# Patient Record
Sex: FEMALE | Race: WHITE | Employment: OTHER | URBAN - METROPOLITAN AREA
[De-identification: names, ages, dates, MRNs, and addresses within clinical notes are randomized per-mention and may not be internally consistent; named-entity substitution may affect disease eponyms.]

---

## 2017-01-02 ENCOUNTER — HOSPITAL ENCOUNTER (EMERGENCY)
Facility: HOSPITAL | Age: 82
Discharge: HOME | End: 2017-01-02
Attending: EMERGENCY MEDICINE

## 2017-01-02 ENCOUNTER — APPOINTMENT (OUTPATIENT)
Dept: CT IMAGING | Facility: HOSPITAL | Age: 82
End: 2017-01-02
Attending: EMERGENCY MEDICINE

## 2017-01-02 VITALS
HEART RATE: 61 BPM | BODY MASS INDEX: 29.16 KG/M2 | WEIGHT: 175 LBS | RESPIRATION RATE: 16 BRPM | SYSTOLIC BLOOD PRESSURE: 117 MMHG | DIASTOLIC BLOOD PRESSURE: 68 MMHG | TEMPERATURE: 98 F | HEIGHT: 65 IN | OXYGEN SATURATION: 95 %

## 2017-01-02 DIAGNOSIS — B02.29 POSTHERPETIC NEURALGIA: Primary | ICD-10-CM

## 2017-01-02 LAB
ALBUMIN SERPL-MCNC: 3.7 G/DL (ref 3.5–4.8)
ALP LIVER SERPL-CCNC: 79 U/L (ref 55–142)
ALT SERPL-CCNC: 15 U/L (ref 14–54)
AST SERPL-CCNC: 16 U/L (ref 15–41)
BASOPHILS # BLD AUTO: 0.01 X10(3) UL (ref 0–0.1)
BASOPHILS NFR BLD AUTO: 0.2 %
BILIRUB SERPL-MCNC: 0.2 MG/DL (ref 0.1–2)
BILIRUB UR QL STRIP.AUTO: NEGATIVE
BUN BLD-MCNC: 13 MG/DL (ref 8–20)
CALCIUM BLD-MCNC: 8.8 MG/DL (ref 8.3–10.3)
CHLORIDE: 95 MMOL/L (ref 101–111)
CLARITY UR REFRACT.AUTO: CLEAR
CO2: 28 MMOL/L (ref 22–32)
COLOR UR AUTO: YELLOW
CREAT BLD-MCNC: 0.78 MG/DL (ref 0.55–1.02)
EOSINOPHIL # BLD AUTO: 0.02 X10(3) UL (ref 0–0.3)
EOSINOPHIL NFR BLD AUTO: 0.3 %
ERYTHROCYTE [DISTWIDTH] IN BLOOD BY AUTOMATED COUNT: 13.2 % (ref 11.5–16)
GLUCOSE BLD-MCNC: 123 MG/DL (ref 70–99)
GLUCOSE UR STRIP.AUTO-MCNC: NEGATIVE MG/DL
HCT VFR BLD AUTO: 41.7 % (ref 34–50)
HGB BLD-MCNC: 14.4 G/DL (ref 12–16)
IMMATURE GRANULOCYTE COUNT: 0.04 X10(3) UL (ref 0–1)
IMMATURE GRANULOCYTE RATIO %: 0.6 %
KETONES UR STRIP.AUTO-MCNC: NEGATIVE MG/DL
LEUKOCYTE ESTERASE UR QL STRIP.AUTO: NEGATIVE
LIPASE: 150 U/L (ref 73–393)
LYMPHOCYTES # BLD AUTO: 0.92 X10(3) UL (ref 0.9–4)
LYMPHOCYTES NFR BLD AUTO: 14.4 %
M PROTEIN MFR SERPL ELPH: 7.5 G/DL (ref 6.1–8.3)
MCH RBC QN AUTO: 29.7 PG (ref 27–33.2)
MCHC RBC AUTO-ENTMCNC: 34.5 G/DL (ref 31–37)
MCV RBC AUTO: 86 FL (ref 81–100)
MONOCYTES # BLD AUTO: 0.21 X10(3) UL (ref 0.1–0.6)
MONOCYTES NFR BLD AUTO: 3.3 %
NEUTROPHIL ABS PRELIM: 5.17 X10 (3) UL (ref 1.3–6.7)
NEUTROPHILS # BLD AUTO: 5.17 X10(3) UL (ref 1.3–6.7)
NEUTROPHILS NFR BLD AUTO: 81.2 %
NITRITE UR QL STRIP.AUTO: NEGATIVE
PH UR STRIP.AUTO: 6 [PH] (ref 4.5–8)
PLATELET # BLD AUTO: 249 10(3)UL (ref 150–450)
POTASSIUM SERPL-SCNC: 4.4 MMOL/L (ref 3.6–5.1)
PROT UR STRIP.AUTO-MCNC: NEGATIVE MG/DL
RBC # BLD AUTO: 4.85 X10(6)UL (ref 3.8–5.1)
RBC UR QL AUTO: NEGATIVE
RED CELL DISTRIBUTION WIDTH-SD: 41.7 FL (ref 35.1–46.3)
SODIUM SERPL-SCNC: 131 MMOL/L (ref 136–144)
SP GR UR STRIP.AUTO: 1.01 (ref 1–1.03)
UROBILINOGEN UR STRIP.AUTO-MCNC: <2 MG/DL
WBC # BLD AUTO: 6.4 X10(3) UL (ref 4–13)

## 2017-01-02 PROCEDURE — 99285 EMERGENCY DEPT VISIT HI MDM: CPT

## 2017-01-02 PROCEDURE — 81003 URINALYSIS AUTO W/O SCOPE: CPT | Performed by: EMERGENCY MEDICINE

## 2017-01-02 PROCEDURE — 80053 COMPREHEN METABOLIC PANEL: CPT | Performed by: EMERGENCY MEDICINE

## 2017-01-02 PROCEDURE — 83690 ASSAY OF LIPASE: CPT | Performed by: EMERGENCY MEDICINE

## 2017-01-02 PROCEDURE — 85025 COMPLETE CBC W/AUTO DIFF WBC: CPT | Performed by: EMERGENCY MEDICINE

## 2017-01-02 PROCEDURE — 99284 EMERGENCY DEPT VISIT MOD MDM: CPT

## 2017-01-02 PROCEDURE — 74176 CT ABD & PELVIS W/O CONTRAST: CPT

## 2017-01-02 RX ORDER — HYDROCODONE BITARTRATE AND ACETAMINOPHEN 5; 325 MG/1; MG/1
1 TABLET ORAL ONCE
Status: COMPLETED | OUTPATIENT
Start: 2017-01-02 | End: 2017-01-02

## 2017-01-02 RX ORDER — GABAPENTIN 300 MG/1
300 CAPSULE ORAL 3 TIMES DAILY
Qty: 21 CAPSULE | Refills: 0 | Status: ON HOLD | OUTPATIENT
Start: 2017-01-02 | End: 2017-01-10

## 2017-01-02 NOTE — ED PROVIDER NOTES
Patient Seen in: [facility] Emergency Department    History   Patient presents with:  Abdomen/Flank Pain (GI/)    Stated Complaint: abdominal pain x 2 months    HPI    15-year-old female presents for evaluation of right hip and groin pain.   Robina (eight) hours as needed for Pain or Fever. benzonatate 100 MG Oral Cap,  Take 1 capsule (100 mg total) by mouth 3 (three) times daily as needed for cough.    alprazolam 0.25 MG Oral Tab,  Take 1 tablet (0.25 mg total) by mouth every 4 (four) hours as need 1656 98 °F (36.7 °C)   Temp src 01/02/17 1656 Temporal   SpO2 01/02/17 1656 97 %   O2 Device 01/02/17 1656 None (Room air)       Current:/68 mmHg  Pulse 61  Temp(Src) 98 °F (36.7 °C) (Temporal)  Resp 16  Ht 165.1 cm (5' 5\")  Wt 79.379 kg  BMI 29.12 She has no tenderness in this area. I suspect patient's pain is related to postherpetic neuralgia. She will be prescribed gabapentin. She was told to follow-up with her primary care physician to monitor her use of this drug later this week.   Return t

## 2017-01-02 NOTE — ED INITIAL ASSESSMENT (HPI)
Patient c/o right lower back pain that radiates to her abdomen. Patient has had pain for the past two months, but the pain seems to be getting worse.

## 2017-01-06 ENCOUNTER — APPOINTMENT (OUTPATIENT)
Dept: MRI IMAGING | Facility: HOSPITAL | Age: 82
DRG: 552 | End: 2017-01-06
Attending: FAMILY MEDICINE
Payer: MEDICARE

## 2017-01-06 ENCOUNTER — HOSPITAL ENCOUNTER (INPATIENT)
Facility: HOSPITAL | Age: 82
LOS: 4 days | Discharge: HOME | DRG: 552 | End: 2017-01-10
Attending: FAMILY MEDICINE | Admitting: FAMILY MEDICINE
Payer: MEDICARE

## 2017-01-06 ENCOUNTER — APPOINTMENT (OUTPATIENT)
Dept: ULTRASOUND IMAGING | Facility: HOSPITAL | Age: 82
DRG: 552 | End: 2017-01-06
Attending: FAMILY MEDICINE
Payer: MEDICARE

## 2017-01-06 PROBLEM — R52 INTRACTABLE PAIN: Status: ACTIVE | Noted: 2017-01-06

## 2017-01-06 LAB
ALBUMIN SERPL-MCNC: 3.4 G/DL (ref 3.5–4.8)
ALP LIVER SERPL-CCNC: 74 U/L (ref 55–142)
ALT SERPL-CCNC: 19 U/L (ref 14–54)
AST SERPL-CCNC: 15 U/L (ref 15–41)
BASOPHILS # BLD AUTO: 0.06 X10(3) UL (ref 0–0.1)
BASOPHILS NFR BLD AUTO: 0.8 %
BILIRUB SERPL-MCNC: 0.2 MG/DL (ref 0.1–2)
BUN BLD-MCNC: 16 MG/DL (ref 8–20)
CALCIUM BLD-MCNC: 8.3 MG/DL (ref 8.3–10.3)
CHLORIDE: 94 MMOL/L (ref 101–111)
CO2: 29 MMOL/L (ref 22–32)
CREAT BLD-MCNC: 0.8 MG/DL (ref 0.55–1.02)
EOSINOPHIL # BLD AUTO: 0.34 X10(3) UL (ref 0–0.3)
EOSINOPHIL NFR BLD AUTO: 4.4 %
ERYTHROCYTE [DISTWIDTH] IN BLOOD BY AUTOMATED COUNT: 13.4 % (ref 11.5–16)
GLUCOSE BLD-MCNC: 130 MG/DL (ref 70–99)
HCT VFR BLD AUTO: 39.9 % (ref 34–50)
HGB BLD-MCNC: 13.4 G/DL (ref 12–16)
IMMATURE GRANULOCYTE COUNT: 0.04 X10(3) UL (ref 0–1)
IMMATURE GRANULOCYTE RATIO %: 0.5 %
INR BLD: 0.94 (ref 0.89–1.12)
LYMPHOCYTES # BLD AUTO: 3.28 X10(3) UL (ref 0.9–4)
LYMPHOCYTES NFR BLD AUTO: 42.6 %
M PROTEIN MFR SERPL ELPH: 6.6 G/DL (ref 6.1–8.3)
MCH RBC QN AUTO: 29.9 PG (ref 27–33.2)
MCHC RBC AUTO-ENTMCNC: 33.6 G/DL (ref 31–37)
MCV RBC AUTO: 89.1 FL (ref 81–100)
MONOCYTES # BLD AUTO: 0.77 X10(3) UL (ref 0.1–0.6)
MONOCYTES NFR BLD AUTO: 10 %
NEUTROPHIL ABS PRELIM: 3.21 X10 (3) UL (ref 1.3–6.7)
NEUTROPHILS # BLD AUTO: 3.21 X10(3) UL (ref 1.3–6.7)
NEUTROPHILS NFR BLD AUTO: 41.7 %
PLATELET # BLD AUTO: 238 10(3)UL (ref 150–450)
POTASSIUM SERPL-SCNC: 3.3 MMOL/L (ref 3.6–5.1)
PSA SERPL DL<=0.01 NG/ML-MCNC: 12.9 SECONDS (ref 12.3–14.8)
RBC # BLD AUTO: 4.48 X10(6)UL (ref 3.8–5.1)
RED CELL DISTRIBUTION WIDTH-SD: 43.9 FL (ref 35.1–46.3)
SODIUM SERPL-SCNC: 132 MMOL/L (ref 136–144)
WBC # BLD AUTO: 7.7 X10(3) UL (ref 4–13)

## 2017-01-06 PROCEDURE — 72148 MRI LUMBAR SPINE W/O DYE: CPT

## 2017-01-06 PROCEDURE — 76770 US EXAM ABDO BACK WALL COMP: CPT

## 2017-01-06 RX ORDER — HYDROMORPHONE HYDROCHLORIDE 1 MG/ML
0.4 INJECTION, SOLUTION INTRAMUSCULAR; INTRAVENOUS; SUBCUTANEOUS EVERY 2 HOUR PRN
Status: DISCONTINUED | OUTPATIENT
Start: 2017-01-06 | End: 2017-01-10

## 2017-01-06 RX ORDER — PRAVASTATIN SODIUM 20 MG
20 TABLET ORAL NIGHTLY
Status: DISCONTINUED | OUTPATIENT
Start: 2017-01-06 | End: 2017-01-10

## 2017-01-06 RX ORDER — HYDROMORPHONE HYDROCHLORIDE 1 MG/ML
0.8 INJECTION, SOLUTION INTRAMUSCULAR; INTRAVENOUS; SUBCUTANEOUS EVERY 2 HOUR PRN
Status: DISCONTINUED | OUTPATIENT
Start: 2017-01-06 | End: 2017-01-10

## 2017-01-06 RX ORDER — LORAZEPAM 2 MG/ML
0.5 INJECTION INTRAMUSCULAR EVERY 6 HOURS PRN
Status: DISCONTINUED | OUTPATIENT
Start: 2017-01-06 | End: 2017-01-10

## 2017-01-06 RX ORDER — TRIAMTERENE AND HYDROCHLOROTHIAZIDE 37.5; 25 MG/1; MG/1
1 CAPSULE ORAL DAILY
Status: DISCONTINUED | OUTPATIENT
Start: 2017-01-06 | End: 2017-01-10

## 2017-01-06 RX ORDER — ALPRAZOLAM 0.25 MG/1
0.25 TABLET ORAL EVERY 4 HOURS PRN
Status: DISCONTINUED | OUTPATIENT
Start: 2017-01-06 | End: 2017-01-10

## 2017-01-06 RX ORDER — POTASSIUM CHLORIDE 20 MEQ/1
40 TABLET, EXTENDED RELEASE ORAL EVERY 4 HOURS
Status: COMPLETED | OUTPATIENT
Start: 2017-01-06 | End: 2017-01-07

## 2017-01-06 RX ORDER — GABAPENTIN 300 MG/1
300 CAPSULE ORAL 3 TIMES DAILY
Status: DISCONTINUED | OUTPATIENT
Start: 2017-01-06 | End: 2017-01-10

## 2017-01-06 RX ORDER — ACETAMINOPHEN 325 MG/1
650 TABLET ORAL EVERY 6 HOURS PRN
Status: DISCONTINUED | OUTPATIENT
Start: 2017-01-06 | End: 2017-01-10

## 2017-01-06 RX ORDER — ONDANSETRON 2 MG/ML
4 INJECTION INTRAMUSCULAR; INTRAVENOUS EVERY 6 HOURS PRN
Status: DISCONTINUED | OUTPATIENT
Start: 2017-01-06 | End: 2017-01-10

## 2017-01-06 RX ORDER — HYDROCODONE BITARTRATE AND ACETAMINOPHEN 10; 325 MG/1; MG/1
1 TABLET ORAL EVERY 6 HOURS PRN
Status: DISCONTINUED | OUTPATIENT
Start: 2017-01-06 | End: 2017-01-10

## 2017-01-06 RX ORDER — BENZONATATE 100 MG/1
100 CAPSULE ORAL EVERY 4 HOURS PRN
Status: DISCONTINUED | OUTPATIENT
Start: 2017-01-06 | End: 2017-01-10

## 2017-01-06 RX ORDER — LORAZEPAM 2 MG/ML
1 INJECTION INTRAMUSCULAR EVERY 6 HOURS PRN
Status: DISCONTINUED | OUTPATIENT
Start: 2017-01-06 | End: 2017-01-10

## 2017-01-06 RX ORDER — SODIUM CHLORIDE 9 MG/ML
INJECTION, SOLUTION INTRAVENOUS CONTINUOUS
Status: DISCONTINUED | OUTPATIENT
Start: 2017-01-06 | End: 2017-01-09

## 2017-01-06 RX ORDER — PAROXETINE HYDROCHLORIDE 20 MG/1
40 TABLET, FILM COATED ORAL EVERY MORNING
Status: DISCONTINUED | OUTPATIENT
Start: 2017-01-07 | End: 2017-01-10

## 2017-01-06 RX ORDER — HYDROCODONE BITARTRATE AND ACETAMINOPHEN 10; 325 MG/1; MG/1
2 TABLET ORAL EVERY 6 HOURS PRN
Status: DISCONTINUED | OUTPATIENT
Start: 2017-01-06 | End: 2017-01-10

## 2017-01-06 RX ORDER — CYCLOBENZAPRINE HCL 5 MG
5 TABLET ORAL 3 TIMES DAILY PRN
Status: DISCONTINUED | OUTPATIENT
Start: 2017-01-06 | End: 2017-01-10

## 2017-01-06 RX ORDER — HEPARIN SODIUM 5000 [USP'U]/ML
5000 INJECTION, SOLUTION INTRAVENOUS; SUBCUTANEOUS EVERY 12 HOURS
Status: DISCONTINUED | OUTPATIENT
Start: 2017-01-06 | End: 2017-01-07

## 2017-01-06 RX ORDER — TEMAZEPAM 7.5 MG/1
7.5 CAPSULE ORAL NIGHTLY PRN
Status: DISCONTINUED | OUTPATIENT
Start: 2017-01-06 | End: 2017-01-10

## 2017-01-06 RX ORDER — HYDROMORPHONE HYDROCHLORIDE 1 MG/ML
0.2 INJECTION, SOLUTION INTRAMUSCULAR; INTRAVENOUS; SUBCUTANEOUS EVERY 2 HOUR PRN
Status: DISCONTINUED | OUTPATIENT
Start: 2017-01-06 | End: 2017-01-10

## 2017-01-06 RX ORDER — LOSARTAN POTASSIUM 50 MG/1
50 TABLET ORAL DAILY
Status: DISCONTINUED | OUTPATIENT
Start: 2017-01-06 | End: 2017-01-10

## 2017-01-06 NOTE — PROGRESS NOTES
NURSING ADMISSION NOTE      Patient admitted via wheelchair. Oriented to room. Patient alert and oriented x 4. Safety precautions initiated. Bed in low position. Call light in reach. Dr. Anup Carson paged to obtain admitting orders.

## 2017-01-07 ENCOUNTER — APPOINTMENT (OUTPATIENT)
Dept: GENERAL RADIOLOGY | Facility: HOSPITAL | Age: 82
DRG: 552 | End: 2017-01-07
Attending: FAMILY MEDICINE
Payer: MEDICARE

## 2017-01-07 LAB
BILIRUB UR QL STRIP.AUTO: NEGATIVE
CLARITY UR REFRACT.AUTO: CLEAR
COLOR UR AUTO: YELLOW
GLUCOSE UR STRIP.AUTO-MCNC: NEGATIVE MG/DL
KETONES UR STRIP.AUTO-MCNC: NEGATIVE MG/DL
LEUKOCYTE ESTERASE UR QL STRIP.AUTO: NEGATIVE
NITRITE UR QL STRIP.AUTO: NEGATIVE
PH UR STRIP.AUTO: 6 [PH] (ref 4.5–8)
POTASSIUM SERPL-SCNC: 4.4 MMOL/L (ref 3.6–5.1)
PROT UR STRIP.AUTO-MCNC: NEGATIVE MG/DL
SP GR UR STRIP.AUTO: 1.02 (ref 1–1.03)
UROBILINOGEN UR STRIP.AUTO-MCNC: 0.2 MG/DL

## 2017-01-07 PROCEDURE — 71020 XR CHEST PA + LAT CHEST (CPT=71020): CPT

## 2017-01-07 NOTE — PLAN OF CARE
Impaired Functional Mobility    • Achieve highest/safest level of mobility/gait Progressing        MUSCULOSKELETAL - ADULT    • Return mobility to safest level of function Progressing    • Maintain proper alignment of affected body part Progressing

## 2017-01-07 NOTE — CONSULTS
[facility]    Spine Surgery H&P  Dr. Sadia Pal Patient Status:  Inpatient    1935 MRN [id number]   [facility] 3SW-A Attending Annalisa Garcia MD   Hosp Day # 1 PCP Jerome Donovan MD     Subjective:  Sonam Ambrocio Signs: Blood pressure 117/62, pulse 63, temperature 97.4 °F (36.3 °C), temperature source Oral, resp. rate 18, height 5' 3\" (1.6 m), weight 175 lb (79.379 kg), SpO2 95 %.     Pain Assessment  Pain Assessment Tool: 0-10  Pasero Sedation Scale: Awake and al lymphadenopathy. Neurologic: Cranial nerves II- XII are grossly intact. Motor strength and sensory examination is grossly normal.  No focal neurologic deficit.     Radiographic Studies : reviewed    Labs:    Lab Results  Component Value Date   WBC 7.7 01/ postoperative     Post-herpetic polyneuropathy     Strain     Intractable pain          Lesli Lin  1/7/2017  11:50 AM

## 2017-01-07 NOTE — PROGRESS NOTES
Spoke with Dr. Karolina Vigli regarding new consult for [facility]. Plan for JOSÉ in IR with Dr. Karolina Vigil on Monday. Notified Dr. Christo Lucas at this time. Pt to remain in hospital until her JOSÉ on Monday.

## 2017-01-07 NOTE — PROGRESS NOTES
[facility]  Progress Note    Durene Linear Patient Status:  Inpatient    1935 MRN [id number]   [facility] 3SW-A Attending Augusto Horn MD   Hosp Day # 1 PCP Yuli Diaz MD       SUBJECTIVE:  No CP, SOB, or N/V.   Pain contr (CPT=71020)     INDICATIONS:  copd     COMPARISON:  [facility] , CT APPENDIX ABD/PEL WO CONTRAST (CPT=74176), 1/02/2017, 18:52.  [facility] , XR CHEST AP/PA (1 VIEW) (CPT=71010), 12/27/2016, 16:30.     TECHNIQUE:  PA and lateral chest radiographs were obtained.     stenosis. Narrowing of bilateral subarticular zones. . Moderate severe bilateral neural foraminal stenosis. L4-L5:  Moderate diffuse disc bulge bilateral facet hypertrophy and ligamenta flava hypertrophy. Moderate to severe spinal canal stenosis.  Narrowing cm  ECHOGENICITY:  Normal.  HYDRONEPHROSIS:  None. CYSTS/STONES/MASSES:  Multiple renal cysts, largest measuring 4.8 x 4.2 x 4.5 cm is within the midpole and 7.6 x 6.0 x 7.8 cm     BLADDER:  No bladder wall thickening.  Bilateral ureteral jets are identifi Intravenous Q6H PRN   Or      LORazepam (ATIVAN) injection 1 mg 1 mg Intravenous Q6H PRN   ondansetron HCl (ZOFRAN) injection 4 mg 4 mg Intravenous Q6H PRN         Assessment  Patient Active Problem List:     Acute diverticulitis     HTN (hypertension)

## 2017-01-07 NOTE — H&P
[facility]  History & Physical    Maame Adler Patient Status:  Inpatient    1935 MRN [id number]   [facility] 3SW-A Attending Sherlynn Cheadle, MD   Hosp Day # 1 PCP Renaldo Litten, MD     History of Present Illness:  Maame Adler drugs. Allergies:    Sulfa Antibiotics       Hives  Latex                   Rash    Home Medications:    Prescriptions prior to admission:  gabapentin 300 MG Oral Cap Take 1 capsule (300 mg total) by mouth 3 (three) times daily.  (Patient not taking: Rep Taking   PARoxetine HCl (PAXIL) 40 MG Oral Tab Take 40 mg by mouth every morning. Disp:  Rfl:  Taking       Review of Systems:  Pertinent items are noted in HPI.     Physical Exam:  BP 97/50 mmHg  Pulse 55  Temp(Src) 98 °F (36.7 °C) (Oral)  Resp 16  Ht 160 01/06/2017   .0 01/06/2017   CREATSERUM 0.80 01/06/2017   BUN 16 01/06/2017    01/06/2017   K 3.3 01/06/2017   CL 94 01/06/2017   CO2 29.0 01/06/2017    01/06/2017   CA 8.3 01/06/2017   ALB 3.4 01/06/2017   ALKPHO 74 01/06/2017   BILT 0

## 2017-01-07 NOTE — PHYSICAL THERAPY NOTE
PHYSICAL THERAPY EVALUATION - INPATIENT     Room Number: 382/382-A  Evaluation Date: 1/7/2017  Type of Evaluation: Initial  Physician Order: PT Eval and Treat    Presenting Problem: Intractable LBP  Reason for Therapy: Mobility Dysfunction and Discharg better    OBJECTIVE  Precautions: None  Fall Risk: Standard fall risk    WEIGHT BEARING RESTRICTION  Weight Bearing Restriction: None                PAIN ASSESSMENT  Rating: 3  Location: R LBP  Management Techniques:  Activity promotion;Repositioning    COG Provided: Pt received in bed. Bed mobility skills at (S) level. Sit > stand with cues for hand placement and (S). Amb with RW and SBA x 350 feet with overall slow speed and occasional veering to the R.  Returned to room to sit up in chair for lunch.   Ad

## 2017-01-07 NOTE — PLAN OF CARE
MUSCULOSKELETAL - ADULT    • Return mobility to safest level of function Progressing    • Maintain proper alignment of affected body part Progressing        PAIN - ADULT    • Verbalizes/displays adequate comfort level or patient's stated pain goal Progress

## 2017-01-08 LAB
ALBUMIN SERPL-MCNC: 3.4 G/DL (ref 3.5–4.8)
ALP LIVER SERPL-CCNC: 78 U/L (ref 55–142)
ALT SERPL-CCNC: 19 U/L (ref 14–54)
AST SERPL-CCNC: 28 U/L (ref 15–41)
BASOPHILS # BLD AUTO: 0.08 X10(3) UL (ref 0–0.1)
BASOPHILS NFR BLD AUTO: 0.8 %
BILIRUB SERPL-MCNC: 0.4 MG/DL (ref 0.1–2)
BUN BLD-MCNC: 12 MG/DL (ref 8–20)
CALCIUM BLD-MCNC: 8.8 MG/DL (ref 8.3–10.3)
CHLORIDE: 94 MMOL/L (ref 101–111)
CO2: 31 MMOL/L (ref 22–32)
CREAT BLD-MCNC: 0.77 MG/DL (ref 0.55–1.02)
EOSINOPHIL # BLD AUTO: 0.42 X10(3) UL (ref 0–0.3)
EOSINOPHIL NFR BLD AUTO: 4.4 %
ERYTHROCYTE [DISTWIDTH] IN BLOOD BY AUTOMATED COUNT: 13.4 % (ref 11.5–16)
GLUCOSE BLD-MCNC: 79 MG/DL (ref 70–99)
HCT VFR BLD AUTO: 41.9 % (ref 34–50)
HGB BLD-MCNC: 14.1 G/DL (ref 12–16)
IMMATURE GRANULOCYTE COUNT: 0.05 X10(3) UL (ref 0–1)
IMMATURE GRANULOCYTE RATIO %: 0.5 %
LYMPHOCYTES # BLD AUTO: 2.52 X10(3) UL (ref 0.9–4)
LYMPHOCYTES NFR BLD AUTO: 26.6 %
M PROTEIN MFR SERPL ELPH: 6.9 G/DL (ref 6.1–8.3)
MCH RBC QN AUTO: 30.1 PG (ref 27–33.2)
MCHC RBC AUTO-ENTMCNC: 33.7 G/DL (ref 31–37)
MCV RBC AUTO: 89.5 FL (ref 81–100)
MONOCYTES # BLD AUTO: 0.73 X10(3) UL (ref 0.1–0.6)
MONOCYTES NFR BLD AUTO: 7.7 %
NEUTROPHIL ABS PRELIM: 5.67 X10 (3) UL (ref 1.3–6.7)
NEUTROPHILS # BLD AUTO: 5.67 X10(3) UL (ref 1.3–6.7)
NEUTROPHILS NFR BLD AUTO: 60 %
PLATELET # BLD AUTO: 251 10(3)UL (ref 150–450)
POTASSIUM SERPL-SCNC: 4.6 MMOL/L (ref 3.6–5.1)
RBC # BLD AUTO: 4.68 X10(6)UL (ref 3.8–5.1)
RED CELL DISTRIBUTION WIDTH-SD: 43.9 FL (ref 35.1–46.3)
SODIUM SERPL-SCNC: 130 MMOL/L (ref 136–144)
WBC # BLD AUTO: 9.5 X10(3) UL (ref 4–13)

## 2017-01-08 NOTE — PROGRESS NOTES
Patient to receive LESI in patient by IR on Monday. Follow up with spine surgery outpatient. No urgent surgical indications at this time.    It was discussed with Dr. Homa Hughes MD and RN   Follow up with IR and MD.   Continue PT.

## 2017-01-08 NOTE — PROGRESS NOTES
[facility]  Progress Note    Diamond Colón Patient Status:  Inpatient    1935 MRN [id number]   [facility] 3SW-A Attending Louann Dela Cruz MD   Hosp Day # 2 PCP Aiden Edwards MD       SUBJECTIVE:  No CP, SOB, or N/V.   Still a lo 20 mg 20 mg Oral Nightly   Triamterene-HCTZ (DYAZIDE) 37.5-25 MG per cap 1 capsule 1 capsule Oral Daily   HYDROcodone-acetaminophen (NORCO)  MG per tab 1 tablet 1 tablet Oral Q6H PRN   Or      HYDROcodone-acetaminophen (NORCO)  MG per tab 2 tab input  Await brad  Pain consult  Plan to work on pain control at home         Tayler Linares  1/8/2017  12:52 PM

## 2017-01-08 NOTE — PROGRESS NOTES
Spoke with Dr. Priscilla Poon. Pt to go for [facility] tomorrow. NPO after midnight. Pt not on Heparin. Morning labs ordered.     Plan discussed by/with: Milagro Salas RN/Dr. Priscilla Poon

## 2017-01-08 NOTE — CONSULTS
[facility]  Report of Consultation    Heriberto Romo Patient Status:  Inpatient    1935 MRN [id number]   [facility] 3SW-A Attending Herson Seay MD   Hosp Day # 2 PCP Evaristo Chopra MD   Reason for Consultation: pt seen on  mg, 0.25 mg, Oral, Q4H PRN  •  benzonatate (TESSALON) cap 100 mg, 100 mg, Oral, Q4H PRN  •  acetaminophen (TYLENOL) tab 650 mg, 650 mg, Oral, Q6H PRN  •  Cyclobenzaprine HCl (FLEXERIL) tab 5 mg, 5 mg, Oral, TID PRN  •  Fluticasone Furoate-Vilanterol (BREO bilaterally  Heart: regular rate and rhythm  Abdomen: soft, non-tender; bowel sounds normal; no masses,  no organomegaly  Skin: Skin color, texture, turgor normal. No rashes or lesions  Neurologic: Grossly normal    Laboratory Data:         Component Resul flank pain    Recommendations:  No acute urologic condition causing pain  No additional  w/u necessary   Suspect musculoskeletal cause of discomfort      Thank you for allowing me to participate in the care of your patient.     Aryan Morgan  1/8/2017  10

## 2017-01-09 ENCOUNTER — APPOINTMENT (OUTPATIENT)
Dept: INTERVENTIONAL RADIOLOGY/VASCULAR | Facility: HOSPITAL | Age: 82
DRG: 552 | End: 2017-01-09
Attending: ANESTHESIOLOGY
Payer: MEDICARE

## 2017-01-09 LAB
BASOPHILS # BLD AUTO: 0.07 X10(3) UL (ref 0–0.1)
BASOPHILS NFR BLD AUTO: 1.1 %
BUN BLD-MCNC: 10 MG/DL (ref 8–20)
CALCIUM BLD-MCNC: 8.4 MG/DL (ref 8.3–10.3)
CHLORIDE: 96 MMOL/L (ref 101–111)
CO2: 31 MMOL/L (ref 22–32)
CREAT BLD-MCNC: 0.67 MG/DL (ref 0.55–1.02)
EOSINOPHIL # BLD AUTO: 0.48 X10(3) UL (ref 0–0.3)
EOSINOPHIL NFR BLD AUTO: 7.8 %
ERYTHROCYTE [DISTWIDTH] IN BLOOD BY AUTOMATED COUNT: 13.1 % (ref 11.5–16)
GLUCOSE BLD-MCNC: 82 MG/DL (ref 70–99)
HCT VFR BLD AUTO: 40 % (ref 34–50)
HGB BLD-MCNC: 13.9 G/DL (ref 12–16)
IMMATURE GRANULOCYTE COUNT: 0.02 X10(3) UL (ref 0–1)
IMMATURE GRANULOCYTE RATIO %: 0.3 %
INR BLD: 0.96 (ref 0.89–1.12)
LYMPHOCYTES # BLD AUTO: 2.61 X10(3) UL (ref 0.9–4)
LYMPHOCYTES NFR BLD AUTO: 42.2 %
MCH RBC QN AUTO: 29.8 PG (ref 27–33.2)
MCHC RBC AUTO-ENTMCNC: 34.8 G/DL (ref 31–37)
MCV RBC AUTO: 85.8 FL (ref 81–100)
MONOCYTES # BLD AUTO: 0.53 X10(3) UL (ref 0.1–0.6)
MONOCYTES NFR BLD AUTO: 8.6 %
NEUTROPHIL ABS PRELIM: 2.47 X10 (3) UL (ref 1.3–6.7)
NEUTROPHILS # BLD AUTO: 2.47 X10(3) UL (ref 1.3–6.7)
NEUTROPHILS NFR BLD AUTO: 40 %
PLATELET # BLD AUTO: 226 10(3)UL (ref 150–450)
POTASSIUM SERPL-SCNC: 3.7 MMOL/L (ref 3.6–5.1)
PSA SERPL DL<=0.01 NG/ML-MCNC: 13.1 SECONDS (ref 12.3–14.8)
RBC # BLD AUTO: 4.66 X10(6)UL (ref 3.8–5.1)
RED CELL DISTRIBUTION WIDTH-SD: 40.7 FL (ref 35.1–46.3)
SODIUM SERPL-SCNC: 134 MMOL/L (ref 136–144)
WBC # BLD AUTO: 6.2 X10(3) UL (ref 4–13)

## 2017-01-09 PROCEDURE — 62323 NJX INTERLAMINAR LMBR/SAC: CPT

## 2017-01-09 PROCEDURE — 3E0R33Z INTRODUCTION OF ANTI-INFLAMMATORY INTO SPINAL CANAL, PERCUTANEOUS APPROACH: ICD-10-PCS | Performed by: ANESTHESIOLOGY

## 2017-01-09 PROCEDURE — 99152 MOD SED SAME PHYS/QHP 5/>YRS: CPT

## 2017-01-09 PROCEDURE — BR191ZZ FLUOROSCOPY OF LUMBAR SPINE USING LOW OSMOLAR CONTRAST: ICD-10-PCS | Performed by: ANESTHESIOLOGY

## 2017-01-09 RX ORDER — POTASSIUM CHLORIDE 14.9 MG/ML
20 INJECTION INTRAVENOUS ONCE
Status: COMPLETED | OUTPATIENT
Start: 2017-01-09 | End: 2017-01-09

## 2017-01-09 RX ORDER — MIDAZOLAM HYDROCHLORIDE 1 MG/ML
INJECTION INTRAMUSCULAR; INTRAVENOUS
Status: COMPLETED
Start: 2017-01-09 | End: 2017-01-09

## 2017-01-09 RX ORDER — METHYLPREDNISOLONE ACETATE 80 MG/ML
INJECTION, SUSPENSION INTRA-ARTICULAR; INTRALESIONAL; INTRAMUSCULAR; SOFT TISSUE
Status: COMPLETED
Start: 2017-01-09 | End: 2017-01-09

## 2017-01-09 NOTE — PHYSICAL THERAPY NOTE
Attempted PT session, pt politely declining mobility, as she would like to get cleaned up. Pt received up in chair doing her makeup. She states she will ambulate with nursing later. Pt is up with supervision with RW.  Reviewed spine precautions/body mechani

## 2017-01-09 NOTE — PROGRESS NOTES
1237 PM - Pt off unit to go for JOSÉ accompanied by     1339 PM - Pt came back from IR. RN called Dr. Pillo Tsang for discharge clearance.  As per MD he would see patient later and will see if she;s okay to go home

## 2017-01-10 VITALS
DIASTOLIC BLOOD PRESSURE: 82 MMHG | HEIGHT: 63 IN | HEART RATE: 63 BPM | RESPIRATION RATE: 16 BRPM | BODY MASS INDEX: 31.38 KG/M2 | TEMPERATURE: 98 F | SYSTOLIC BLOOD PRESSURE: 134 MMHG | OXYGEN SATURATION: 96 % | WEIGHT: 177.13 LBS

## 2017-01-10 LAB — POTASSIUM SERPL-SCNC: 4.4 MMOL/L (ref 3.6–5.1)

## 2017-01-10 PROCEDURE — 99152 MOD SED SAME PHYS/QHP 5/>YRS: CPT | Performed by: ANESTHESIOLOGY

## 2017-01-10 PROCEDURE — 62323 NJX INTERLAMINAR LMBR/SAC: CPT | Performed by: ANESTHESIOLOGY

## 2017-01-10 RX ORDER — IPRATROPIUM BROMIDE AND ALBUTEROL SULFATE 2.5; .5 MG/3ML; MG/3ML
3 SOLUTION RESPIRATORY (INHALATION)
Status: DISCONTINUED | OUTPATIENT
Start: 2017-01-10 | End: 2017-01-10

## 2017-01-10 RX ORDER — HYDROCODONE BITARTRATE AND ACETAMINOPHEN 10; 325 MG/1; MG/1
2 TABLET ORAL EVERY 6 HOURS PRN
Qty: 42 TABLET | Refills: 0 | Status: SHIPPED | OUTPATIENT
Start: 2017-01-10 | End: 2017-01-11

## 2017-01-10 RX ORDER — IPRATROPIUM BROMIDE AND ALBUTEROL SULFATE 2.5; .5 MG/3ML; MG/3ML
3 SOLUTION RESPIRATORY (INHALATION)
Qty: 270 ML | Refills: 0 | Status: SHIPPED | OUTPATIENT
Start: 2017-01-10 | End: 2017-02-09

## 2017-01-10 RX ORDER — GABAPENTIN 300 MG/1
300 CAPSULE ORAL 3 TIMES DAILY
Qty: 90 CAPSULE | Refills: 5 | Status: SHIPPED | OUTPATIENT
Start: 2017-01-10 | End: 2017-03-07

## 2017-01-10 NOTE — PROGRESS NOTES
[facility]  Progress Note    Durene Linear Patient Status:  Inpatient    1935 MRN [id number]   [facility] 3SW-A Attending Augusto Horn MD   Hosp Day # 4 PCP Yuli Diaz MD       SUBJECTIVE:  No CP, SOB, or N/V.   Still sign capsule Oral Daily   HYDROcodone-acetaminophen (NORCO)  MG per tab 1 tablet 1 tablet Oral Q6H PRN   Or      HYDROcodone-acetaminophen (NORCO)  MG per tab 2 tablet 2 tablet Oral Q6H PRN   HYDROmorphone HCl PF (DILAUDID) 1 MG/ML injection 0.2 mg

## 2017-01-10 NOTE — PROGRESS NOTES
Discharge orders received, instructions given to the pt and . All needs and questions were answered. Prescription for Norco given.  Safety prec in place

## 2017-01-10 NOTE — PROGRESS NOTES
NURSING DISCHARGE NOTE    Discharged VIA wheelchair  Accompanied by   Belongings sent with the patient

## 2017-01-10 NOTE — CDS QUERY
Jaci Pérez  1935 DOS 17-01/10/17 [id number]  [id number]    Jaymie Castaneda  Dear Doctor Dr. Deniece Siemens,  Clinical information (provided below) indicates an unknown status of a documented diagnosis.  For accurate ICD-10-CM

## 2017-01-11 ENCOUNTER — TELEPHONE (OUTPATIENT)
Dept: SURGERY | Facility: CLINIC | Age: 82
End: 2017-01-11

## 2017-01-11 RX ORDER — METHYLPREDNISOLONE 4 MG/1
TABLET ORAL
Qty: 1 KIT | Refills: 0 | Status: SHIPPED | OUTPATIENT
Start: 2017-01-11 | End: 2017-03-07

## 2017-01-11 RX ORDER — HYDROCODONE BITARTRATE AND ACETAMINOPHEN 10; 325 MG/1; MG/1
1 TABLET ORAL EVERY 8 HOURS PRN
Qty: 60 TABLET | Refills: 0 | Status: SHIPPED | OUTPATIENT
Start: 2017-01-11 | End: 2017-03-07

## 2017-01-11 NOTE — TELEPHONE ENCOUNTER
Pt is unable to make appointment today. Offered Follow up on 1/18/2017, pt declined and would like to be placed on wait list for Friday 1/13/2017.

## 2017-01-11 NOTE — TELEPHONE ENCOUNTER
Spoke with pt who would like to schedule a follow up with Dr. Robel Silverio due to pain. Offered appointment for today at 3pm. Pt to call back to confirm if pt is able to make appointment.

## 2017-01-12 NOTE — TELEPHONE ENCOUNTER
Spoke with pt  and informed him of Medrol kit that was sent to pharmacy. Also informed him of new Norco Rx. Verbalized understanding no further questions at this time.

## 2017-01-13 ENCOUNTER — OFFICE VISIT (OUTPATIENT)
Dept: SURGERY | Facility: CLINIC | Age: 82
End: 2017-01-13

## 2017-01-13 VITALS
HEIGHT: 65 IN | DIASTOLIC BLOOD PRESSURE: 76 MMHG | HEART RATE: 81 BPM | RESPIRATION RATE: 17 BRPM | BODY MASS INDEX: 28.82 KG/M2 | WEIGHT: 173 LBS | SYSTOLIC BLOOD PRESSURE: 118 MMHG

## 2017-01-13 DIAGNOSIS — M47.816 LUMBAR FACET ARTHROPATHY: ICD-10-CM

## 2017-01-13 DIAGNOSIS — M46.1 SACROILIITIS (HCC): Primary | ICD-10-CM

## 2017-01-13 PROCEDURE — 99214 OFFICE O/P EST MOD 30 MIN: CPT | Performed by: ANESTHESIOLOGY

## 2017-01-13 NOTE — PATIENT INSTRUCTIONS
Refill policies:    • Allow 2 business days for refills; controlled substances may take longer.   • Contact your pharmacy at least 5 days prior to running out of medication and have them send an electronic request or submit request through the “request re your physician has recommended that you have a procedure or additional testing performed. [facility] will contact your insurance carrier to obtain pre-certification or prior authorization.     Unfortunately, SUSIE has seen an increas lakes[place].    Medication:   Number of days you need to be off for the following medications:  • Aggrenox 10 days   • Agrylin (Anagrelide) 10 days   • Enbrel (Etanercept) 24 hours   • Fragmin (Dalteparin) 24 hours   • Pletal (Cilostazol) 7 days  • Pradaxa 10 da your primary care physician if your blood sugar rises as you may require some medication adjustment.   It is normal to have increased pain at injection site for up to 3-5 days after procedure, you can use heat for the first 24 hours (20 minutes on 20 minute the introducer needle is in a good position by X-ray, a special electrically active needle tip is inserted. With this special needle tip in good position, electrical stimulation is done before any actual radiofrequency ablation.  This electrical stimulation afterward. Ice or heat packs will usually control this discomfort. After that first several days, your pain may be gone or diminished. What should I do after the radiofrequency ablation? You should have a ride home.  You must have a ride home if you rece that can be potentially damaged. Electricity is also used during the procedure raising the possibility of an electrical burn. Great care is taken when placing the radiofrequency needles and using the electrical current, but sometimes complications occur.  Cheryle Levins

## 2017-01-15 NOTE — OPERATIVE REPORT
[facility]  Operative Report  1/10/2017     Arvind Cowan Patient Status:  Inpatient    1935 MRN [id number]   [facility] 3SW-A Attending No att. providers found   Hosp Day # 4 PCP Jerome Donovan MD     Indication: Nora Door is a [de-identified] yea 6 mL was injected through the Tuohy needle. The needle was flushed with 1 mL lidocaine. The needle was withdrawn with the stylet intact in situ. The needle's tip was intact.   The patient tolerated the procedure very well without significant immediate co

## 2017-01-16 NOTE — PROGRESS NOTES
Name: Darlyn Polk   : 1935   DOS: 2017     Pain Clinic Follow Up Visit:   Darlyn Polk is a [de-identified]year old female who presents for recheck of her chronic low back pain. She is status post lumbar epidural steroid injection which did not help her. fluticasone-salmeterol 500-50 MCG/DOSE Inhalation Aerosol Powder, Breath Activated Inhale 1 puff into the lungs 2 (two) times daily as needed. Disp:  Rfl:    magnesium oxide 400 MG Oral Tab Take 400 mg by mouth daily.    Disp:  Rfl:    Triamterene-HCTZ patient indicates understanding of these issues and agrees to the plan. No Follow-up on file.     Zahra George MD, 1/13/2017, 6:56 PM

## 2017-02-09 ENCOUNTER — APPOINTMENT (OUTPATIENT)
Dept: GENERAL RADIOLOGY | Facility: HOSPITAL | Age: 82
End: 2017-02-09
Attending: ANESTHESIOLOGY
Payer: MEDICARE

## 2017-02-09 ENCOUNTER — HOSPITAL ENCOUNTER (OUTPATIENT)
Facility: HOSPITAL | Age: 82
Setting detail: HOSPITAL OUTPATIENT SURGERY
Discharge: HOME | End: 2017-02-09
Attending: ANESTHESIOLOGY | Admitting: ANESTHESIOLOGY
Payer: MEDICARE

## 2017-02-09 ENCOUNTER — SURGERY (OUTPATIENT)
Age: 82
End: 2017-02-09

## 2017-02-09 VITALS
RESPIRATION RATE: 18 BRPM | TEMPERATURE: 98 F | OXYGEN SATURATION: 94 % | HEART RATE: 78 BPM | DIASTOLIC BLOOD PRESSURE: 78 MMHG | SYSTOLIC BLOOD PRESSURE: 130 MMHG

## 2017-02-09 DIAGNOSIS — M46.1 SACROILIITIS (HCC): ICD-10-CM

## 2017-02-09 PROCEDURE — 3E0T3GC INTRODUCTION OF OTHER THERAPEUTIC SUBSTANCE INTO PERIPHERAL NERVES AND PLEXI, PERCUTANEOUS APPROACH: ICD-10-PCS | Performed by: ANESTHESIOLOGY

## 2017-02-09 PROCEDURE — 99152 MOD SED SAME PHYS/QHP 5/>YRS: CPT | Performed by: ANESTHESIOLOGY

## 2017-02-09 PROCEDURE — 3E0T3BZ INTRODUCTION OF ANESTHETIC AGENT INTO PERIPHERAL NERVES AND PLEXI, PERCUTANEOUS APPROACH: ICD-10-PCS | Performed by: ANESTHESIOLOGY

## 2017-02-09 PROCEDURE — 3E0T33Z INTRODUCTION OF ANTI-INFLAMMATORY INTO PERIPHERAL NERVES AND PLEXI, PERCUTANEOUS APPROACH: ICD-10-PCS | Performed by: ANESTHESIOLOGY

## 2017-02-09 RX ORDER — METHYLPREDNISOLONE ACETATE 40 MG/ML
INJECTION, SUSPENSION INTRA-ARTICULAR; INTRALESIONAL; INTRAMUSCULAR; SOFT TISSUE AS NEEDED
Status: DISCONTINUED | OUTPATIENT
Start: 2017-02-09 | End: 2017-02-09 | Stop reason: HOSPADM

## 2017-02-09 RX ORDER — SODIUM CHLORIDE, SODIUM LACTATE, POTASSIUM CHLORIDE, CALCIUM CHLORIDE 600; 310; 30; 20 MG/100ML; MG/100ML; MG/100ML; MG/100ML
100 INJECTION, SOLUTION INTRAVENOUS CONTINUOUS
Status: DISCONTINUED | OUTPATIENT
Start: 2017-02-09 | End: 2017-02-09

## 2017-02-09 RX ORDER — SODIUM CHLORIDE, SODIUM LACTATE, POTASSIUM CHLORIDE, CALCIUM CHLORIDE 600; 310; 30; 20 MG/100ML; MG/100ML; MG/100ML; MG/100ML
100 INJECTION, SOLUTION INTRAVENOUS CONTINUOUS
Status: CANCELLED | OUTPATIENT
Start: 2017-02-09

## 2017-02-09 RX ORDER — LIDOCAINE HYDROCHLORIDE 10 MG/ML
INJECTION, SOLUTION EPIDURAL; INFILTRATION; INTRACAUDAL; PERINEURAL AS NEEDED
Status: DISCONTINUED | OUTPATIENT
Start: 2017-02-09 | End: 2017-02-09 | Stop reason: HOSPADM

## 2017-02-09 NOTE — OPERATIVE REPORT
[facility]  Operative Report  2017     Thelma Martinez Patient Status:  Hospital Outpatient Surgery    1935 MRN [id number]   Location [facility] Attending No att. providers found   [facility] Day # 0 PCP Dinesh Reinoso inferior pole of the right sacroiliac joint. The second needle was placed into the joint but approximately 1 cm cephalad to the first needle. The subsequent needles were place in this \"leap frog\" fashion until the superior pole of the joint is reached.  U

## 2017-02-09 NOTE — H&P
History & Physical Examination    Patient Name: Jarvis Starr  MRN: [id number]  CSN: [id number]  YOB: 1935    Pre-Operative Diagnosis:  Sacroiliitis ([facility] [M46.1]    Present Illness: A [de-identified]year old female with low back pain is here for RFA of the mouth daily. Disp:  Rfl:  Taking   PARoxetine HCl (PAXIL) 40 MG Oral Tab Take 40 mg by mouth every morning.  Disp:  Rfl:  Taking       Current Facility-Administered Medications:  lactated ringers infusion 100 mL/hr Intravenous Continuous       Allergies: reviewed the History and Physical done within the last 30 days. Any changes noted above.     FRANCISCA Kirkland

## 2017-02-14 NOTE — DISCHARGE SUMMARY
[facility]  Discharge Summary    Vaibhav Figeuroa Patient Status:  Inpatient    1935 MRN [id number]   [facility] 3SW-A Attending No att. providers found   [facility] Day # 4 PCP Eric Dietrich MD     Date of Admission: 2017  3:59 P Cap  Take 1 capsule (300 mg total) by mouth 3 (three) times daily. , Normal, Disp-90 capsule, R-5      CONTINUE these medications which have NOT CHANGED    Capsaicin 0.075 % External Cream  Apply 1 Application topically 3 (three) times daily. , Normal, Disp-

## 2017-02-16 ENCOUNTER — APPOINTMENT (OUTPATIENT)
Dept: GENERAL RADIOLOGY | Facility: HOSPITAL | Age: 82
End: 2017-02-16
Attending: ANESTHESIOLOGY
Payer: MEDICARE

## 2017-02-16 ENCOUNTER — HOSPITAL ENCOUNTER (OUTPATIENT)
Facility: HOSPITAL | Age: 82
Setting detail: HOSPITAL OUTPATIENT SURGERY
Discharge: HOME | End: 2017-02-16
Attending: ANESTHESIOLOGY | Admitting: ANESTHESIOLOGY
Payer: MEDICARE

## 2017-02-16 ENCOUNTER — SURGERY (OUTPATIENT)
Age: 82
End: 2017-02-16

## 2017-02-16 VITALS
OXYGEN SATURATION: 100 % | RESPIRATION RATE: 18 BRPM | SYSTOLIC BLOOD PRESSURE: 111 MMHG | HEART RATE: 70 BPM | TEMPERATURE: 98 F | DIASTOLIC BLOOD PRESSURE: 75 MMHG

## 2017-02-16 DIAGNOSIS — M47.816 LUMBAR FACET ARTHROPATHY: ICD-10-CM

## 2017-02-16 PROCEDURE — 3E0T3TZ INTRODUCTION OF DESTRUCTIVE AGENT INTO PERIPHERAL NERVES AND PLEXI, PERCUTANEOUS APPROACH: ICD-10-PCS | Performed by: ANESTHESIOLOGY

## 2017-02-16 PROCEDURE — 99152 MOD SED SAME PHYS/QHP 5/>YRS: CPT | Performed by: ANESTHESIOLOGY

## 2017-02-16 RX ORDER — LIDOCAINE HYDROCHLORIDE 10 MG/ML
INJECTION, SOLUTION EPIDURAL; INFILTRATION; INTRACAUDAL; PERINEURAL AS NEEDED
Status: DISCONTINUED | OUTPATIENT
Start: 2017-02-16 | End: 2017-02-16 | Stop reason: HOSPADM

## 2017-02-16 RX ORDER — SODIUM CHLORIDE, SODIUM LACTATE, POTASSIUM CHLORIDE, CALCIUM CHLORIDE 600; 310; 30; 20 MG/100ML; MG/100ML; MG/100ML; MG/100ML
100 INJECTION, SOLUTION INTRAVENOUS CONTINUOUS
Status: DISCONTINUED | OUTPATIENT
Start: 2017-02-16 | End: 2017-02-16

## 2017-02-16 RX ORDER — METHYLPREDNISOLONE ACETATE 40 MG/ML
INJECTION, SUSPENSION INTRA-ARTICULAR; INTRALESIONAL; INTRAMUSCULAR; SOFT TISSUE AS NEEDED
Status: DISCONTINUED | OUTPATIENT
Start: 2017-02-16 | End: 2017-02-16 | Stop reason: HOSPADM

## 2017-02-16 NOTE — H&P
History & Physical Examination    Patient Name: Abdelrahman Patel  MRN: [id number]  CSN: [id number]  YOB: 1935    Pre-Operative Diagnosis:  Lumbar facet arthropathy [M12.88]    Present Illness: A [de-identified]year old female with low back pain is here for RFA Tab Take 1 tablet (5 mg total) by mouth 3 (three) times daily as needed for Muscle spasms. Disp: 15 tablet Rfl: 0 Taking   simvastatin (ZOCOR) 20 MG Oral Tab Take 20 mg by mouth nightly.    Disp:  Rfl: 5 Taking   Multiple Vitamins-Minerals (CENTRUM SILVER U discussed with her that it is not unusual for patients to not notice relief until up to 1-2 months after RFA procedures.  I have encouraged her to follow up with us in the office after her injection procedures and we will reevaluate her pain again at that t

## 2017-02-16 NOTE — OPERATIVE REPORT
[facility]  Operative Report  2017     Diamond Constable Patient Status:  Hospital Outpatient Surgery    1935 MRN [id number]   Location [facility] Attending No att. providers found   [address] Day # 0 PCP Maranda Underwood transverse process and the lateral aspect of the same level superior articular process at right L2-L3 level . The needle was then walked off the bony tissue and advanced 2 to 3 mm to lie along the path of the right L2 medial branch nerve.   AP and lateral

## 2017-03-07 ENCOUNTER — OFFICE VISIT (OUTPATIENT)
Dept: RHEUMATOLOGY | Facility: CLINIC | Age: 82
End: 2017-03-07

## 2017-03-07 VITALS
DIASTOLIC BLOOD PRESSURE: 80 MMHG | WEIGHT: 173 LBS | SYSTOLIC BLOOD PRESSURE: 122 MMHG | HEART RATE: 76 BPM | RESPIRATION RATE: 20 BRPM | BODY MASS INDEX: 29 KG/M2

## 2017-03-07 DIAGNOSIS — M47.816 OSTEOARTHRITIS OF LUMBAR SPINE, UNSPECIFIED SPINAL OSTEOARTHRITIS COMPLICATION STATUS: Primary | ICD-10-CM

## 2017-03-07 DIAGNOSIS — M17.0 PRIMARY OSTEOARTHRITIS OF BOTH KNEES: ICD-10-CM

## 2017-03-07 PROCEDURE — 99213 OFFICE O/P EST LOW 20 MIN: CPT | Performed by: INTERNAL MEDICINE

## 2017-03-07 RX ORDER — HYDROCODONE BITARTRATE AND ACETAMINOPHEN 5; 325 MG/1; MG/1
1 TABLET ORAL EVERY 6 HOURS PRN
Qty: 120 TABLET | Refills: 0 | Status: SHIPPED | OUTPATIENT
Start: 2017-03-07 | End: 2017-03-07

## 2017-03-07 RX ORDER — HYDROCODONE BITARTRATE AND ACETAMINOPHEN 5; 325 MG/1; MG/1
1 TABLET ORAL EVERY 6 HOURS PRN
Qty: 120 TABLET | Refills: 0 | Status: SHIPPED | OUTPATIENT
Start: 2017-04-07 | End: 2017-03-07

## 2017-03-07 RX ORDER — GABAPENTIN 300 MG/1
300 CAPSULE ORAL 3 TIMES DAILY
Qty: 90 CAPSULE | Refills: 5 | Status: SHIPPED | OUTPATIENT
Start: 2017-03-07 | End: 2018-04-19 | Stop reason: ALTCHOICE

## 2017-03-07 RX ORDER — HYDROCODONE BITARTRATE AND ACETAMINOPHEN 5; 325 MG/1; MG/1
1 TABLET ORAL EVERY 6 HOURS PRN
Qty: 120 TABLET | Refills: 0 | Status: SHIPPED | OUTPATIENT
Start: 2017-05-07 | End: 2017-12-05

## 2017-03-07 RX ORDER — ALPRAZOLAM 0.25 MG/1
0.25 TABLET ORAL EVERY 4 HOURS PRN
Qty: 90 TABLET | Refills: 1 | Status: CANCELLED | OUTPATIENT
Start: 2017-03-07

## 2017-03-07 NOTE — PROGRESS NOTES
[facility]  Dr. Paige Exon Progress Note     Subjective:   Kaiden Mccrary is a(n) [de-identified]year old female. Current complaints: Patient presents with:  Osteoarthritis: Pt states 'is doing well. Would like to discuss muscle relaxers.  Had injections in spine by

## 2017-03-07 NOTE — PATIENT INSTRUCTIONS
Current plan is to continue the Norco 5 mg 1 tablet every 4-6 hours as needed for pain up to 4 day. The Norco is a 5 mg dose. For depression continue take Paxil 1 a day. Continue to see Angelita Luther regarding pain control and pain injections.   Use your Xanax

## 2017-04-20 RX ORDER — IBUPROFEN 600 MG/1
TABLET ORAL
Qty: 60 TABLET | Refills: 0 | Status: SHIPPED | OUTPATIENT
Start: 2017-04-20 | End: 2017-06-08

## 2017-04-20 NOTE — TELEPHONE ENCOUNTER
Future Appointments  Date Time Provider Pina Mancuso   4/4/1389 8:20 PM Juan Lay MD [facility] Shelia Brown       Is pt still on ibuprofen? Last refill was in June 2016-- ok to fill?  mp

## 2017-05-09 ENCOUNTER — TELEPHONE (OUTPATIENT)
Dept: RHEUMATOLOGY | Facility: CLINIC | Age: 82
End: 2017-05-09

## 2017-05-09 RX ORDER — LORAZEPAM 0.5 MG/1
0.5 TABLET ORAL EVERY 6 HOURS PRN
Qty: 100 TABLET | Refills: 0 | Status: SHIPPED | OUTPATIENT
Start: 2017-05-09 | End: 2017-06-08

## 2017-05-09 NOTE — TELEPHONE ENCOUNTER
Pt called requesting refill on lorazepam, informed pt we do not have her on that medication, on 93/1/43 Dr. Jacqueline Camara ordered alprazolam 0.25mg #90 x1.  Pt stated she is not taking those \"they make me tired, I found this old bottle of lorazepam and I like tho

## 2017-05-09 NOTE — TELEPHONE ENCOUNTER
Called pt to let her know prescription for Lorazepam faxed over to pharmacy.  Pt stated she is not taking alprazolam.

## 2017-06-08 ENCOUNTER — OFFICE VISIT (OUTPATIENT)
Dept: RHEUMATOLOGY | Facility: CLINIC | Age: 82
End: 2017-06-08

## 2017-06-08 VITALS
WEIGHT: 172 LBS | BODY MASS INDEX: 29 KG/M2 | SYSTOLIC BLOOD PRESSURE: 138 MMHG | RESPIRATION RATE: 18 BRPM | DIASTOLIC BLOOD PRESSURE: 76 MMHG | HEART RATE: 72 BPM

## 2017-06-08 DIAGNOSIS — M21.161 ACQUIRED GENU VARUM OF BOTH LOWER EXTREMITIES: ICD-10-CM

## 2017-06-08 DIAGNOSIS — M17.0 PRIMARY OSTEOARTHRITIS OF BOTH KNEES: Primary | ICD-10-CM

## 2017-06-08 DIAGNOSIS — M21.162 ACQUIRED GENU VARUM OF BOTH LOWER EXTREMITIES: ICD-10-CM

## 2017-06-08 DIAGNOSIS — M47.816 SPONDYLOSIS OF LUMBAR REGION WITHOUT MYELOPATHY OR RADICULOPATHY: ICD-10-CM

## 2017-06-08 PROCEDURE — 99213 OFFICE O/P EST LOW 20 MIN: CPT | Performed by: INTERNAL MEDICINE

## 2017-06-08 RX ORDER — LORAZEPAM 0.5 MG/1
0.5 TABLET ORAL EVERY 6 HOURS PRN
Qty: 100 TABLET | Refills: 1 | Status: SHIPPED | OUTPATIENT
Start: 2017-06-08 | End: 2017-09-07

## 2017-06-08 RX ORDER — HYDROCODONE BITARTRATE AND ACETAMINOPHEN 5; 325 MG/1; MG/1
1 TABLET ORAL EVERY 6 HOURS PRN
Qty: 120 TABLET | Refills: 0 | Status: SHIPPED | OUTPATIENT
Start: 2017-06-08 | End: 2017-07-08

## 2017-06-08 RX ORDER — HYDROCODONE BITARTRATE AND ACETAMINOPHEN 5; 325 MG/1; MG/1
1 TABLET ORAL EVERY 6 HOURS PRN
Qty: 120 TABLET | Refills: 0 | Status: SHIPPED | OUTPATIENT
Start: 2017-08-07 | End: 2017-08-07

## 2017-06-08 RX ORDER — HYDROCODONE BITARTRATE AND ACETAMINOPHEN 5; 325 MG/1; MG/1
1 TABLET ORAL EVERY 6 HOURS PRN
Qty: 120 TABLET | Refills: 0 | Status: SHIPPED | OUTPATIENT
Start: 2017-07-08 | End: 2017-08-07

## 2017-06-08 NOTE — PROGRESS NOTES
[facility]  Dr. Justina Olivo Progress Note     Subjective:   Pal Araya is a(n) [de-identified]year old female. Current complaints: Patient presents with:  Osteoarthritis: 3 month f/u. Continues with pain bilateral knees/back.  Went to see Dr. Ramirez Nelson

## 2017-06-08 NOTE — PATIENT INSTRUCTIONS
Recommendation is to continue to use Norco for pain. Use the 5 mg Norco 1 tablet every 4-6 hours up to 4 day to control the pain. For depression continue take your Paxil 1 a day. For nerve irritation or stress take lorazepam 1 a day as needed.   If there

## 2017-06-12 ENCOUNTER — TELEPHONE (OUTPATIENT)
Dept: RHEUMATOLOGY | Facility: CLINIC | Age: 82
End: 2017-06-12

## 2017-07-31 ENCOUNTER — OFFICE VISIT (OUTPATIENT)
Dept: SURGERY | Facility: CLINIC | Age: 82
End: 2017-07-31

## 2017-07-31 VITALS
BODY MASS INDEX: 30.12 KG/M2 | HEIGHT: 63 IN | TEMPERATURE: 98 F | HEART RATE: 69 BPM | WEIGHT: 170 LBS | SYSTOLIC BLOOD PRESSURE: 104 MMHG | DIASTOLIC BLOOD PRESSURE: 64 MMHG | RESPIRATION RATE: 14 BRPM

## 2017-07-31 DIAGNOSIS — J44.9 CHRONIC OBSTRUCTIVE PULMONARY DISEASE, UNSPECIFIED COPD TYPE (HCC): ICD-10-CM

## 2017-07-31 DIAGNOSIS — Z72.0 TOBACCO ABUSE, EPISODIC: ICD-10-CM

## 2017-07-31 DIAGNOSIS — B02.23 POST-HERPETIC POLYNEUROPATHY: ICD-10-CM
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
## 2017-07-31 PROCEDURE — 99214 OFFICE O/P EST MOD 30 MIN: CPT | Performed by: COLON & RECTAL SURGERY

## 2017-07-31 NOTE — H&P
New Patient Visit Note       Active Problems      1. Accidental fall, initial encounter    2. Chronic obstructive pulmonary disease, unspecified COPD type ([facility]    3. Tobacco abuse, episodic    4.  Post-herpetic polyneuropathy        Chief Complaint   Susan several epidural injections for lumbar disc disease. Again these are outside of the area of her current pain and symptoms. The patient states that driving irritates the back is she is to use her right foot.   She states that changes in position also agg social history have been reviewed by me today.     Family History   Problem Relation Age of Onset   • Cancer Sister      Social History    Marital status:              Spouse name:                       Years of education:                 Number of c total) by mouth 3 (three) times daily as needed for Muscle spasms. Disp: 15 tablet Rfl: 0   fluticasone-salmeterol 500-50 MCG/DOSE Inhalation Aerosol Powder, Breath Activated Inhale 1 puff into the lungs 2 (two) times daily as needed.    Disp:  Rfl:    Tria place, and time. She appears well-developed and well-nourished. No distress. HENT:   Head: Normocephalic and atraumatic. Eyes: Conjunctivae are normal. No scleral icterus. Neck: Trachea normal. No JVD present. Carotid bruit is not present.  No trachea guarding or rebound. Obese, no masses. No ascites. Liver and spleen are not palpable. She has no inguinal, umbilical, or incisional hernias present. She has well-healed incision sites from her previous abdominal operations.   None of these incisions he small chance that she may have cracked a vertebrae in the thoracic spine. Her other complicating factor is that she has had shingles on her back. They were in the lumbar region. The pain preceded the presentation of the shingles by several weeks.   Her Liver and spleen are not palpable. She has no inguinal, umbilical, or incisional hernias present. She has well-healed incision sites from her previous abdominal operations.   None of these incisions her trocar sites are in line with her current right flan

## 2017-07-31 NOTE — PATIENT INSTRUCTIONS
This patient has had very severe right flank pain. She states that it started soon after an operation for diverticulitis. Dr. Yasmin Davis operated with laparoscopic low anterior resection of the rectosigmoid colon on September 17, 2014.   It was for very chronic COPD. She continues to smoke 8-10 cigarettes per day. She has had pneumonia in the past, she states her cough is increasing. There is also a chance that she could have pleurisy and a pleural effusion that are leading to these symptoms.     Clinic her to possibly check in with Dr. Pierre Gage regarding her COPD and her increasing cough. If the thoracic and lumbar spine series shows new fractures, she may require an MRI of this region after her recent fall.

## 2017-08-02 ENCOUNTER — HOSPITAL ENCOUNTER (OUTPATIENT)
Dept: GENERAL RADIOLOGY | Facility: HOSPITAL | Age: 82
Discharge: HOME | End: 2017-08-02
Attending: COLON & RECTAL SURGERY
Payer: MEDICARE

## 2017-08-02 DIAGNOSIS — J44.9 CHRONIC OBSTRUCTIVE PULMONARY DISEASE, UNSPECIFIED COPD TYPE (HCC): ICD-10-CM
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
## 2017-08-02 PROCEDURE — 72072 X-RAY EXAM THORAC SPINE 3VWS: CPT | Performed by: COLON & RECTAL SURGERY

## 2017-08-02 PROCEDURE — 72114 X-RAY EXAM L-S SPINE BENDING: CPT | Performed by: COLON & RECTAL SURGERY

## 2017-08-02 PROCEDURE — 71020 XR CHEST PA + LAT CHEST (CPT=71020): CPT | Performed by: COLON & RECTAL SURGERY

## 2017-08-04 ENCOUNTER — TELEPHONE (OUTPATIENT)
Dept: SURGERY | Facility: CLINIC | Age: 82
End: 2017-08-04

## 2017-08-04 NOTE — TELEPHONE ENCOUNTER
Pt had a CXR, thoracic and lumbar spine x-rays which did not show new findings, explained all this to pt and still encouraged her to see Dr. Jocelyn Mathew per Dr. Catherine Gonzalez request to see if something could be done to address her current pain issues.  Pt verbalized und

## 2017-08-07 ENCOUNTER — OFFICE VISIT (OUTPATIENT)
Dept: SURGERY | Facility: CLINIC | Age: 82
End: 2017-08-07

## 2017-08-07 VITALS
BODY MASS INDEX: 29.02 KG/M2 | SYSTOLIC BLOOD PRESSURE: 128 MMHG | WEIGHT: 170 LBS | DIASTOLIC BLOOD PRESSURE: 68 MMHG | HEART RATE: 76 BPM | HEIGHT: 64 IN

## 2017-08-07 DIAGNOSIS — M47.819 SPONDYLOSIS WITHOUT MYELOPATHY: ICD-10-CM

## 2017-08-07 DIAGNOSIS — M46.1 SACROILIITIS, NOT ELSEWHERE CLASSIFIED (HCC): ICD-10-CM

## 2017-08-07 DIAGNOSIS — M51.36 DDD (DEGENERATIVE DISC DISEASE), LUMBAR: Primary | ICD-10-CM

## 2017-08-07 DIAGNOSIS — M47.816 LUMBAR FACET ARTHROPATHY: ICD-10-CM

## 2017-08-07 PROCEDURE — 99214 OFFICE O/P EST MOD 30 MIN: CPT | Performed by: PHYSICIAN ASSISTANT

## 2017-08-07 NOTE — PATIENT INSTRUCTIONS
Refill policies:    • Allow 2-3 business days for refills; controlled substances may take longer.   • Contact your pharmacy at least 5 days prior to running out of medication and have them send an electronic request or submit request through the [facility] have a procedure or additional testing performed. [facility]) will contact your insurance carrier to obtain pre-certification or prior authorization.     Unfortunately, SUSIE has seen an increase in denial of payment even though the p note-No prescriptions will be written by Pain Clinic in [place] on the day of procedure. If you require a refill of medications, please contact the office 48 hours prior to your procedure.   • If you have an implanted Spinal Cord or Peripheral Nerve Stimulator: 10 weeks after your last procedure date   Please call our office with any questions or concerns before or after your procedure at [phone].   If you are a diabetic, please increase the frequency of your glucose monitoring after the procedure as this may local anesthetic. Then X-ray or fluoroscopy is used to guide placement of the introducer needles. Since nerves cannot actually be seen on x-ray, the introducer needles are positioned using bony landmarks that indicate where the nerves usually are located. enough to communicate easily with the physician during the procedure. However, some patients receive enough sedation that they have amnesia and cannot always remember parts or all of the actual procedure.    What should I expect after the radiofrequency abl effects and the possibility of complications. The risks and complications are dependent upon the sites that are ablated. Since the introducer needles have to go through skin and soft tissues, there will usually be some soreness and occasionally bruising.  Hunter Amos

## 2017-08-07 NOTE — PROGRESS NOTES
Name: Becky Siddiqui   : 1935   DOS: 2017     Pain Clinic Follow Up Visit:   Patient presents with: Other: follow up      Becky Siddiqui is a 81year old female who presents for recheck of chronic low back pain.  Pt is S/P 17 LESI at L4-5 with u Application topically 3 (three) times daily. Disp: 28.3 g Rfl: 0   Cyclobenzaprine HCl 5 MG Oral Tab Take 1 tablet (5 mg total) by mouth 3 (three) times daily as needed for Muscle spasms.  Disp: 15 tablet Rfl: 0   fluticasone-salmeterol 500-50 MCG/DOSE Madalyn Clifton with the dermatomal distribution of lumbar and sacral, and not thoracic. I would not recommend T11 SNRB unless her pain distribution changes. 4. I asked pt to come in and be evaluated after injections.     Medications filled today:  No prescriptions reque

## 2017-08-08 ENCOUNTER — TELEPHONE (OUTPATIENT)
Dept: SURGERY | Facility: CLINIC | Age: 82
End: 2017-08-08

## 2017-08-08 NOTE — TELEPHONE ENCOUNTER
Returned pt call. Ivone Pinto updated and MRI ordered. Given [employer] phone number. All questions answered. Requesting Tasia Nation give her a call when she is free. Informed pt that she is seeing pt's but will let her know to call if possible.  Pt verb

## 2017-08-08 NOTE — TELEPHONE ENCOUNTER
Called pt that yes she should get LS MRI. I had told her several times yesterday that her bones are so thin that xray not sensitive enough to  occult fracture. Also that none of her procedures can be done in the office. Needs to be done in OR.

## 2017-08-28 ENCOUNTER — TELEPHONE (OUTPATIENT)
Dept: NEUROLOGY | Facility: CLINIC | Age: 82
End: 2017-08-28

## 2017-08-29 ENCOUNTER — HOSPITAL ENCOUNTER (OUTPATIENT)
Dept: CT IMAGING | Facility: HOSPITAL | Age: 82
Discharge: HOME | End: 2017-08-29
Attending: FAMILY MEDICINE
Payer: MEDICARE

## 2017-08-29 DIAGNOSIS — R91.8 LUNG MASS: ICD-10-CM

## 2017-08-29 PROCEDURE — 71260 CT THORAX DX C+: CPT | Performed by: FAMILY MEDICINE

## 2017-08-31 ENCOUNTER — TELEPHONE (OUTPATIENT)
Dept: SURGERY | Facility: CLINIC | Age: 82
End: 2017-08-31

## 2017-09-01 RX ORDER — BUSPIRONE HYDROCHLORIDE 5 MG/1
TABLET ORAL
Refills: 2 | COMMUNITY
Start: 2017-08-28 | End: 2017-12-05 | Stop reason: ALTCHOICE

## 2017-09-01 RX ORDER — LEVOFLOXACIN 500 MG/1
TABLET, FILM COATED ORAL
Refills: 0 | COMMUNITY
Start: 2017-08-28 | End: 2017-09-07

## 2017-09-07 ENCOUNTER — OFFICE VISIT (OUTPATIENT)
Dept: RHEUMATOLOGY | Facility: CLINIC | Age: 82
End: 2017-09-07

## 2017-09-07 VITALS
RESPIRATION RATE: 24 BRPM | HEIGHT: 65 IN | BODY MASS INDEX: 28.32 KG/M2 | WEIGHT: 170 LBS | DIASTOLIC BLOOD PRESSURE: 70 MMHG | SYSTOLIC BLOOD PRESSURE: 130 MMHG | HEART RATE: 80 BPM

## 2017-09-07 DIAGNOSIS — M47.816 SPONDYLOSIS OF LUMBAR REGION WITHOUT MYELOPATHY OR RADICULOPATHY: ICD-10-CM

## 2017-09-07 DIAGNOSIS — M17.0 PRIMARY OSTEOARTHRITIS OF BOTH KNEES: Primary | ICD-10-CM

## 2017-09-07 PROCEDURE — 99213 OFFICE O/P EST LOW 20 MIN: CPT | Performed by: INTERNAL MEDICINE

## 2017-09-07 RX ORDER — HYDROCODONE BITARTRATE AND ACETAMINOPHEN 5; 325 MG/1; MG/1
1 TABLET ORAL EVERY 6 HOURS PRN
Qty: 120 TABLET | Refills: 0 | Status: SHIPPED | OUTPATIENT
Start: 2017-09-07 | End: 2017-10-07

## 2017-09-07 RX ORDER — HYDROCODONE BITARTRATE AND ACETAMINOPHEN 5; 325 MG/1; MG/1
1 TABLET ORAL EVERY 6 HOURS PRN
Qty: 120 TABLET | Refills: 0 | Status: SHIPPED | OUTPATIENT
Start: 2017-10-07 | End: 2017-11-06

## 2017-09-07 RX ORDER — LORAZEPAM 0.5 MG/1
0.5 TABLET ORAL EVERY 6 HOURS PRN
Qty: 100 TABLET | Refills: 1 | Status: SHIPPED | OUTPATIENT
Start: 2017-09-07 | End: 2018-03-06

## 2017-09-07 RX ORDER — CYCLOBENZAPRINE HCL 5 MG
5 TABLET ORAL 3 TIMES DAILY PRN
Qty: 60 TABLET | Refills: 1 | Status: SHIPPED | OUTPATIENT
Start: 2017-09-07 | End: 2017-12-05

## 2017-09-07 RX ORDER — HYDROCODONE BITARTRATE AND ACETAMINOPHEN 5; 325 MG/1; MG/1
1 TABLET ORAL EVERY 6 HOURS PRN
Qty: 120 TABLET | Refills: 0 | Status: SHIPPED | OUTPATIENT
Start: 2017-11-06 | End: 2017-12-06

## 2017-09-07 NOTE — PROGRESS NOTES
[facility]  Dr. Duncan Hodge Progress Note     Subjective:   Teto Stokes is a(n) 81year old female. Current complaints: Patient presents with:  Osteoarthritis: 3 month f/u. Pain right side. Pain in both knees. LBP too.   Swelling right foot   Toes a

## 2017-09-07 NOTE — PATIENT INSTRUCTIONS
Miguelito Neville continue Paxil 1 tablet daily 40 mg for fight depression. Use lorazepam generally 1 a day for relaxation and relief of stress. Take Norco for pain 5 mg 1 tablet every so often up to 4 day. Try to stay active by doing some mild walking  .   At this ti

## 2017-12-05 ENCOUNTER — OFFICE VISIT (OUTPATIENT)
Dept: RHEUMATOLOGY | Facility: CLINIC | Age: 82
End: 2017-12-05

## 2017-12-05 VITALS
RESPIRATION RATE: 16 BRPM | HEIGHT: 65 IN | DIASTOLIC BLOOD PRESSURE: 74 MMHG | BODY MASS INDEX: 28.49 KG/M2 | WEIGHT: 171 LBS | SYSTOLIC BLOOD PRESSURE: 138 MMHG | HEART RATE: 74 BPM

## 2017-12-05 DIAGNOSIS — M17.0 PRIMARY OSTEOARTHRITIS OF BOTH KNEES: Primary | ICD-10-CM

## 2017-12-05 DIAGNOSIS — M47.816 OSTEOARTHRITIS OF LUMBAR SPINE, UNSPECIFIED SPINAL OSTEOARTHRITIS COMPLICATION STATUS: ICD-10-CM

## 2017-12-05 DIAGNOSIS — F33.0 MILD EPISODE OF RECURRENT MAJOR DEPRESSIVE DISORDER (HCC): ICD-10-CM

## 2017-12-05 PROBLEM — F33.9 EPISODE OF RECURRENT MAJOR DEPRESSIVE DISORDER (HCC): Status: ACTIVE | Noted: 2017-12-05

## 2017-12-05 PROCEDURE — 99213 OFFICE O/P EST LOW 20 MIN: CPT | Performed by: INTERNAL MEDICINE

## 2017-12-05 RX ORDER — CYCLOBENZAPRINE HCL 5 MG
5 TABLET ORAL 3 TIMES DAILY PRN
Qty: 90 TABLET | Refills: 2 | Status: SHIPPED | OUTPATIENT
Start: 2017-12-05 | End: 2018-03-06

## 2017-12-05 RX ORDER — SERTRALINE HYDROCHLORIDE 100 MG/1
100 TABLET, FILM COATED ORAL DAILY
Qty: 30 TABLET | Refills: 2 | Status: SHIPPED | OUTPATIENT
Start: 2017-12-05 | End: 2018-03-06

## 2017-12-05 RX ORDER — HYDROCODONE BITARTRATE AND ACETAMINOPHEN 5; 325 MG/1; MG/1
1 TABLET ORAL EVERY 6 HOURS PRN
Qty: 120 TABLET | Refills: 0 | Status: SHIPPED | OUTPATIENT
Start: 2018-02-09 | End: 2018-03-11

## 2017-12-05 RX ORDER — HYDROCODONE BITARTRATE AND ACETAMINOPHEN 5; 325 MG/1; MG/1
1 TABLET ORAL EVERY 6 HOURS PRN
Qty: 120 TABLET | Refills: 0 | Status: SHIPPED | OUTPATIENT
Start: 2017-12-11 | End: 2018-01-10

## 2017-12-05 RX ORDER — HYDROCODONE BITARTRATE AND ACETAMINOPHEN 5; 325 MG/1; MG/1
1 TABLET ORAL EVERY 6 HOURS PRN
Qty: 120 TABLET | Refills: 0 | Status: SHIPPED | OUTPATIENT
Start: 2018-01-10 | End: 2018-02-09

## 2017-12-05 NOTE — PROGRESS NOTES
[facility]  Dr. Clover Ricci Progress Note     Subjective:   Valdo Hill is a(n) 81year old female. Current complaints: Patient presents with:  Osteoarthritis: 3 month f/u. Continues with bilateral knee with occasional swelling noted.  Pt states gait un

## 2017-12-05 NOTE — PATIENT INSTRUCTIONS
Switch antidepressant to sertraline 100 mg per day. Stop paroxitine. Use Norco for pain 5 mg up to 4 per day. Use Cyclobenzaprine as a muscle relaxant as needed. Return to office in 3 months.

## 2017-12-11 ENCOUNTER — OFFICE VISIT (OUTPATIENT)
Dept: SURGERY | Facility: CLINIC | Age: 82
End: 2017-12-11

## 2017-12-11 VITALS
DIASTOLIC BLOOD PRESSURE: 80 MMHG | WEIGHT: 169 LBS | HEART RATE: 80 BPM | BODY MASS INDEX: 28.85 KG/M2 | HEIGHT: 64 IN | SYSTOLIC BLOOD PRESSURE: 144 MMHG

## 2017-12-11 DIAGNOSIS — M46.1 SACROILIITIS, NOT ELSEWHERE CLASSIFIED (HCC): ICD-10-CM

## 2017-12-11 DIAGNOSIS — M47.816 OSTEOARTHRITIS OF LUMBAR SPINE, UNSPECIFIED SPINAL OSTEOARTHRITIS COMPLICATION STATUS: Primary | ICD-10-CM

## 2017-12-11 DIAGNOSIS — M47.819 SPONDYLOSIS WITHOUT MYELOPATHY: ICD-10-CM

## 2017-12-11 DIAGNOSIS — R52 PAIN MANAGEMENT: ICD-10-CM

## 2017-12-11 PROCEDURE — 99213 OFFICE O/P EST LOW 20 MIN: CPT | Performed by: ANESTHESIOLOGY

## 2017-12-11 PROCEDURE — 20552 NJX 1/MLT TRIGGER POINT 1/2: CPT | Performed by: ANESTHESIOLOGY

## 2017-12-11 RX ORDER — METHYLPREDNISOLONE ACETATE 40 MG/ML
40 INJECTION, SUSPENSION INTRA-ARTICULAR; INTRALESIONAL; INTRAMUSCULAR; SOFT TISSUE ONCE
Status: COMPLETED | OUTPATIENT
Start: 2017-12-11 | End: 2017-12-11

## 2017-12-11 RX ORDER — LIDOCAINE HYDROCHLORIDE 10 MG/ML
11 INJECTION, SOLUTION INFILTRATION; PERINEURAL ONCE
Status: COMPLETED | OUTPATIENT
Start: 2017-12-11 | End: 2017-12-11

## 2017-12-11 NOTE — PATIENT INSTRUCTIONS
Refill policies:    • Allow 2-3 business days for refills; controlled substances may take longer.   • Contact your pharmacy at least 5 days prior to running out of medication and have them send an electronic request or submit request through the [facility] have a procedure or additional testing performed. [facility]) will contact your insurance carrier to obtain pre-certification or prior authorization.     Unfortunately, SUSIE has seen an increase in denial of payment even though the p

## 2017-12-13 ENCOUNTER — TELEPHONE (OUTPATIENT)
Dept: SURGERY | Facility: CLINIC | Age: 82
End: 2017-12-13

## 2017-12-13 NOTE — TELEPHONE ENCOUNTER
Questions r/t the in office injection that pt received on Monday answered. Explained that pt received Depo-Medrol and Lidocaine reviewed healing process of injection. Pt called back with additional questions r/t injection.  Pt states she is having inter

## 2017-12-13 NOTE — TELEPHONE ENCOUNTER
Patient would like information re: what was in the injection done on Monday. What does she do with the pain besides ice. Last night had very bad pain which is better this morning. Please call.

## 2017-12-14 NOTE — PROCEDURES
[facility]  Operative Report  2017     Valdo Hill Patient Status:  No patient class for patient encounter    1935 MRN [id number]   Location [address] [place] [address], [address] [place] [address], [address] [place] [address] Attending No att. providers found   [facility] Day

## 2017-12-14 NOTE — PROGRESS NOTES
Name: Thelma Martinez   : 1935   DOS: 2017     Pain Clinic Follow Up Visit:   Thelma Martinez is a 81year old female who presents for recheck of her chronic low back pain. She is status post lumbar epidural steroid injection which did not help her. B 12 OR) Take 500 mg by mouth daily. Disp:  Rfl:    benzonatate 200 MG Oral Cap TK 1 C PO TID Disp:  Rfl: 1   gabapentin 300 MG Oral Cap Take 1 capsule (300 mg total) by mouth 3 (three) times daily. (Patient taking differently: Take 300 mg by mouth daily. were placed in this encounter. Medications filled today:  No prescriptions requested or ordered in this encounter    Radiology orders and consultations:None    The patient indicates understanding of these issues and agrees to the plan.   No Follow-up o

## 2018-01-29 ENCOUNTER — HOSPITAL ENCOUNTER (OUTPATIENT)
Dept: GENERAL RADIOLOGY | Facility: HOSPITAL | Age: 83
Discharge: HOME | End: 2018-01-29
Attending: INTERNAL MEDICINE
Payer: MEDICARE

## 2018-01-29 DIAGNOSIS — J44.9 OBSTRUCTIVE CHRONIC BRONCHITIS WITHOUT EXACERBATION (HCC): ICD-10-CM

## 2018-01-29 PROCEDURE — 71046 X-RAY EXAM CHEST 2 VIEWS: CPT | Performed by: INTERNAL MEDICINE

## 2018-03-06 ENCOUNTER — OFFICE VISIT (OUTPATIENT)
Dept: RHEUMATOLOGY | Facility: CLINIC | Age: 83
End: 2018-03-06

## 2018-03-06 VITALS
RESPIRATION RATE: 20 BRPM | SYSTOLIC BLOOD PRESSURE: 132 MMHG | WEIGHT: 169 LBS | HEART RATE: 68 BPM | DIASTOLIC BLOOD PRESSURE: 84 MMHG | BODY MASS INDEX: 29 KG/M2

## 2018-03-06 DIAGNOSIS — M51.36 DEGENERATIVE DISC DISEASE, LUMBAR: ICD-10-CM

## 2018-03-06 DIAGNOSIS — M47.816 SPONDYLOSIS OF LUMBAR REGION WITHOUT MYELOPATHY OR RADICULOPATHY: Primary | ICD-10-CM

## 2018-03-06 DIAGNOSIS — G89.29 CHRONIC RIGHT SHOULDER PAIN: ICD-10-CM

## 2018-03-06 DIAGNOSIS — M17.0 PRIMARY OSTEOARTHRITIS OF BOTH KNEES: ICD-10-CM

## 2018-03-06 DIAGNOSIS — M25.511 CHRONIC RIGHT SHOULDER PAIN: ICD-10-CM
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
## 2018-03-06 PROCEDURE — 99213 OFFICE O/P EST LOW 20 MIN: CPT | Performed by: INTERNAL MEDICINE

## 2018-03-06 RX ORDER — HYDROCODONE BITARTRATE AND ACETAMINOPHEN 5; 325 MG/1; MG/1
1 TABLET ORAL EVERY 6 HOURS PRN
Qty: 120 TABLET | Refills: 0 | Status: SHIPPED | OUTPATIENT
Start: 2018-03-10 | End: 2018-04-09

## 2018-03-06 RX ORDER — PAROXETINE HYDROCHLORIDE 40 MG/1
TABLET, FILM COATED ORAL DAILY
COMMUNITY
Start: 2018-02-26 | End: 2019-01-21 | Stop reason: DRUGHIGH

## 2018-03-06 RX ORDER — CYCLOBENZAPRINE HCL 5 MG
5 TABLET ORAL 3 TIMES DAILY PRN
Qty: 90 TABLET | Refills: 2 | Status: SHIPPED | OUTPATIENT
Start: 2018-03-06 | End: 2018-06-05

## 2018-03-06 RX ORDER — HYDROCODONE BITARTRATE AND ACETAMINOPHEN 5; 325 MG/1; MG/1
1 TABLET ORAL EVERY 6 HOURS PRN
Qty: 120 TABLET | Refills: 0 | Status: SHIPPED | OUTPATIENT
Start: 2018-04-10 | End: 2018-05-10

## 2018-03-06 RX ORDER — LORAZEPAM 0.5 MG/1
0.5 TABLET ORAL EVERY 6 HOURS PRN
Qty: 100 TABLET | Refills: 1 | Status: SHIPPED | OUTPATIENT
Start: 2018-03-06 | End: 2018-06-05

## 2018-03-06 RX ORDER — HYDROCODONE BITARTRATE AND ACETAMINOPHEN 5; 325 MG/1; MG/1
1 TABLET ORAL EVERY 6 HOURS PRN
Qty: 120 TABLET | Refills: 0 | Status: SHIPPED | OUTPATIENT
Start: 2018-05-09 | End: 2018-06-08

## 2018-03-06 RX ORDER — CELECOXIB 200 MG/1
200 CAPSULE ORAL 2 TIMES DAILY
Qty: 60 CAPSULE | Refills: 3 | Status: SHIPPED | OUTPATIENT
Start: 2018-03-06 | End: 2018-06-05

## 2018-03-06 NOTE — PATIENT INSTRUCTIONS
Plan for the right shoulder pain is to do the following. For inflammation in the shoulder which can cause bursitis or tendinitis take Celebrex 1 tablet twice a day 200 mg.   For pain in the shoulder and elsewhere you can still take your hydrocodone 5 mg 1

## 2018-03-06 NOTE — PROGRESS NOTES
[facility]  Dr. Desiree Valderrama Progress Note     Subjective:   Bonifacio Bird is a(n) 81year old female. Current complaints: Patient presents with:  Osteoarthritis: 3 month f/u. Pt states 'is in pain.  Feels like is being punished for something.'   Refill Re AM

## 2018-05-18 RX ORDER — LORAZEPAM 0.5 MG/1
TABLET ORAL
Qty: 100 TABLET | Refills: 0 | OUTPATIENT
Start: 2018-05-18

## 2018-05-21 RX ORDER — LORAZEPAM 0.5 MG/1
TABLET ORAL
Qty: 100 TABLET | Refills: 0 | OUTPATIENT
Start: 2018-05-21

## 2018-06-05 ENCOUNTER — OFFICE VISIT (OUTPATIENT)
Dept: RHEUMATOLOGY | Facility: CLINIC | Age: 83
End: 2018-06-05

## 2018-06-05 VITALS
BODY MASS INDEX: 30 KG/M2 | HEART RATE: 80 BPM | WEIGHT: 172 LBS | SYSTOLIC BLOOD PRESSURE: 132 MMHG | RESPIRATION RATE: 16 BRPM | DIASTOLIC BLOOD PRESSURE: 82 MMHG

## 2018-06-05 DIAGNOSIS — M17.0 PRIMARY OSTEOARTHRITIS OF BOTH KNEES: Primary | ICD-10-CM

## 2018-06-05 DIAGNOSIS — R52 PAIN MANAGEMENT: ICD-10-CM

## 2018-06-05 DIAGNOSIS — M47.816 SPONDYLOSIS OF LUMBAR REGION WITHOUT MYELOPATHY OR RADICULOPATHY: ICD-10-CM

## 2018-06-05 DIAGNOSIS — F41.9 ANXIETY: ICD-10-CM

## 2018-06-05 DIAGNOSIS — M51.36 DEGENERATIVE DISC DISEASE, LUMBAR: ICD-10-CM

## 2018-06-05 PROCEDURE — 99213 OFFICE O/P EST LOW 20 MIN: CPT | Performed by: INTERNAL MEDICINE

## 2018-06-05 RX ORDER — CELECOXIB 200 MG/1
200 CAPSULE ORAL 2 TIMES DAILY
Qty: 60 CAPSULE | Refills: 3 | Status: SHIPPED | OUTPATIENT
Start: 2018-06-05 | End: 2019-02-01 | Stop reason: CLARIF

## 2018-06-05 RX ORDER — LORAZEPAM 0.5 MG/1
0.5 TABLET ORAL EVERY 6 HOURS PRN
Qty: 100 TABLET | Refills: 1 | Status: SHIPPED | OUTPATIENT
Start: 2018-06-22 | End: 2018-07-27

## 2018-06-05 RX ORDER — GABAPENTIN 300 MG/1
CAPSULE ORAL
Refills: 4 | Status: ON HOLD | COMMUNITY
Start: 2018-04-24 | End: 2018-11-19

## 2018-06-05 RX ORDER — HYDROCODONE BITARTRATE AND ACETAMINOPHEN 5; 325 MG/1; MG/1
1 TABLET ORAL EVERY 6 HOURS PRN
Qty: 120 TABLET | Refills: 0 | Status: SHIPPED | OUTPATIENT
Start: 2018-06-14 | End: 2018-07-14

## 2018-06-05 RX ORDER — HYDROCODONE BITARTRATE AND ACETAMINOPHEN 5; 325 MG/1; MG/1
1 TABLET ORAL EVERY 6 HOURS PRN
Qty: 120 TABLET | Refills: 0 | Status: SHIPPED | OUTPATIENT
Start: 2018-08-14 | End: 2018-09-13

## 2018-06-05 RX ORDER — CYCLOBENZAPRINE HCL 5 MG
5 TABLET ORAL 3 TIMES DAILY PRN
Qty: 90 TABLET | Refills: 2 | Status: SHIPPED | OUTPATIENT
Start: 2018-06-05 | End: 2018-10-19

## 2018-06-05 RX ORDER — HYDROCODONE BITARTRATE AND ACETAMINOPHEN 5; 325 MG/1; MG/1
1 TABLET ORAL EVERY 6 HOURS PRN
Qty: 120 TABLET | Refills: 0 | Status: SHIPPED | OUTPATIENT
Start: 2018-07-14 | End: 2018-08-13

## 2018-06-05 NOTE — PROGRESS NOTES
[facility]  Dr. Liam Westfall Progress Note     Subjective:   Maame Adler is a(n) 81year old female.    Current complaints: Patient presents with:  Fibromyalgia Syndrome: 3 month f/u  Refill Request[place]. lorazepam last refill I

## 2018-06-05 NOTE — PATIENT INSTRUCTIONS
Instructions are to try a new medicine Celebrex 200 mg once or twice a day for relief of arthritis pain. Also use your Norco  5 mg the generic name is hydrocodone 1 tablet up to 4 times a day for pain.   For muscle spasm use cyclobenzaprine 5 mg once or tw

## 2018-07-20 ENCOUNTER — TELEPHONE (OUTPATIENT)
Dept: RHEUMATOLOGY | Facility: CLINIC | Age: 83
End: 2018-07-20

## 2018-07-20 DIAGNOSIS — M17.0 PRIMARY OSTEOARTHRITIS OF BOTH KNEES: Primary | ICD-10-CM

## 2018-07-23 PROBLEM — M17.11 PRIMARY OSTEOARTHRITIS OF RIGHT KNEE: Status: ACTIVE | Noted: 2018-07-23

## 2018-07-24 PROCEDURE — 87660 TRICHOMONAS VAGIN DIR PROBE: CPT | Performed by: OBSTETRICS & GYNECOLOGY

## 2018-07-24 PROCEDURE — 87480 CANDIDA DNA DIR PROBE: CPT | Performed by: OBSTETRICS & GYNECOLOGY

## 2018-07-24 PROCEDURE — 87510 GARDNER VAG DNA DIR PROBE: CPT | Performed by: OBSTETRICS & GYNECOLOGY

## 2018-07-27 RX ORDER — LORAZEPAM 0.5 MG/1
0.5 TABLET ORAL EVERY 6 HOURS PRN
Qty: 100 TABLET | Refills: 1 | Status: SHIPPED | OUTPATIENT
Start: 2018-07-27 | End: 2018-10-19

## 2018-07-27 NOTE — TELEPHONE ENCOUNTER
Pt called. Pt threw out lorazepam script and would like to know if can have a refill sent to pharmacy? Med pended.  mp       Future Appointments  Date Time Provider Pina Mancuso   8/08/8645 0:04 PM Heriberto Mejia MD [facility] Carolann Omalley

## 2018-08-21 ENCOUNTER — TELEPHONE (OUTPATIENT)
Dept: SURGERY | Facility: CLINIC | Age: 83
End: 2018-08-21

## 2018-08-21 NOTE — TELEPHONE ENCOUNTER
Patient was last seen by Dr. Arthur Kinney on 7/2017. At that time Dr. Arthur Kinney referred her to Dr. Barbara Burgos, she dd not have a problem that requred surgical intervention.  Referred patient to PCP and informed her to call for an appointment if he feels she soul be seen

## 2018-08-24 ENCOUNTER — OFFICE VISIT (OUTPATIENT)
Dept: SURGERY | Facility: CLINIC | Age: 83
End: 2018-08-24
Payer: MEDICARE

## 2018-08-24 VITALS
WEIGHT: 169 LBS | HEART RATE: 72 BPM | HEIGHT: 64 IN | TEMPERATURE: 98 F | DIASTOLIC BLOOD PRESSURE: 82 MMHG | BODY MASS INDEX: 28.85 KG/M2 | SYSTOLIC BLOOD PRESSURE: 146 MMHG

## 2018-08-24 DIAGNOSIS — M54.50 ACUTE RIGHT-SIDED LOW BACK PAIN WITHOUT SCIATICA: Primary | ICD-10-CM

## 2018-08-24 PROCEDURE — 99213 OFFICE O/P EST LOW 20 MIN: CPT | Performed by: SURGERY

## 2018-08-24 NOTE — H&P
New Patient Visit Note       Active Problems      1. Acute right-sided low back pain without sciatica        Chief Complaint   Patient presents with:  Back Pain: pt c/o of right lower back pain x 2-3 months, pain is increasing, pt feels fatigued.  Pt dx wit • Shortness of breath    • Visual impairment      Past Surgical History:  1998: CHOLECYSTECTOMY  No date: COLECTOMY  No date: ERCP,REMVAL STONES  05/18/16: HERNIA SURGERY  No date: OOPHORECTOMY  No date: OTHER SURGICAL HISTORY      Comment: Drain for abd 60 capsule Rfl: 3   famotidine 40 MG Oral Tab  Disp:  Rfl:    PARoxetine HCl 40 MG Oral Tab  Disp:  Rfl:    Cholecalciferol (VITAMIN D) 2000 units Oral Cap Take 1 capsule by mouth daily. Disp:  Rfl:    Vitamin C 500 MG Oral Tab Take 500 mg by mouth daily. Physical Findings   /82   Pulse 72   Temp 97.5 °F (36.4 °C) (Oral)   Ht 64\"   Wt 169 lb   BMI 29.01 kg/m²   Physical Exam   Constitutional: She is oriented to person, place, and time. She appears well-developed and well-nourished. No distress. present. Left: No inguinal and no supraclavicular adenopathy present. Neurological: She is alert and oriented to person, place, and time. Skin: Skin is warm and dry. Psychiatric: She has a normal mood and affect.  Her speech is normal and

## 2018-09-07 ENCOUNTER — OFFICE VISIT (OUTPATIENT)
Dept: OBGYN CLINIC | Facility: CLINIC | Age: 83
End: 2018-09-07
Payer: MEDICARE

## 2018-09-07 VITALS
DIASTOLIC BLOOD PRESSURE: 60 MMHG | HEART RATE: 72 BPM | WEIGHT: 167 LBS | HEIGHT: 64 IN | SYSTOLIC BLOOD PRESSURE: 120 MMHG | BODY MASS INDEX: 28.51 KG/M2

## 2018-09-07 DIAGNOSIS — N76.0 VAGINITIS AND VULVOVAGINITIS: Primary | ICD-10-CM

## 2018-09-07 PROCEDURE — 87529 HSV DNA AMP PROBE: CPT | Performed by: OBSTETRICS & GYNECOLOGY

## 2018-09-07 PROCEDURE — 87660 TRICHOMONAS VAGIN DIR PROBE: CPT | Performed by: OBSTETRICS & GYNECOLOGY

## 2018-09-07 PROCEDURE — G0101 CA SCREEN;PELVIC/BREAST EXAM: HCPCS | Performed by: OBSTETRICS & GYNECOLOGY

## 2018-09-07 PROCEDURE — 87510 GARDNER VAG DNA DIR PROBE: CPT | Performed by: OBSTETRICS & GYNECOLOGY

## 2018-09-07 PROCEDURE — 87480 CANDIDA DNA DIR PROBE: CPT | Performed by: OBSTETRICS & GYNECOLOGY

## 2018-09-07 RX ORDER — HYDROCORTISONE ACETATE, ALOE VERA LEAF AND IODOQUINOL 20; 10; 10 MG/G; MG/G; MG/G
1 GEL TOPICAL 2 TIMES DAILY
Qty: 48 G | Refills: 1 | Status: SHIPPED | OUTPATIENT
Start: 2018-09-07 | End: 2019-02-01 | Stop reason: CLARIF

## 2018-09-07 NOTE — PROGRESS NOTES
Kaiden Mccrary is a 81year old female. HPI:   Patient presents with:  Gyn Problem: buring and itching      She was seen and affirm was done which was negative she was tried on nystatin without relief. The itching is gotten worse.   No burning when she pe by mouth daily. Disp:  Rfl:    simvastatin (ZOCOR) 20 MG Oral Tab Take 20 mg by mouth nightly. Disp:  Rfl: 5   Multiple Vitamins-Minerals (CENTRUM SILVER ULTRA WOMENS) Oral Tab Take 1 tablet by mouth daily.  Disp:  Rfl:    Losartan Potassium (COZAAR) 48 M

## 2018-09-10 ENCOUNTER — TELEPHONE (OUTPATIENT)
Dept: OBGYN CLINIC | Facility: CLINIC | Age: 83
End: 2018-09-10

## 2018-09-10 NOTE — TELEPHONE ENCOUNTER
Patient states that she does not see much improvement on the medications that were prescribed to her. Will check with Dr. Luz Matta and call patient back.

## 2018-09-11 LAB
HSV 1 SUBTYPE BY PCR: NOT DETECTED
HSV 2 SUBTYPE BY PCR: NOT DETECTED

## 2018-09-17 ENCOUNTER — TELEPHONE (OUTPATIENT)
Dept: OBGYN CLINIC | Facility: CLINIC | Age: 83
End: 2018-09-17

## 2018-09-17 NOTE — TELEPHONE ENCOUNTER
Patient called the answering service on Friday about an upcoming appt - please call to see if she needs to cancel/reschedule. Thanks.

## 2018-09-20 ENCOUNTER — OFFICE VISIT (OUTPATIENT)
Dept: OBGYN CLINIC | Facility: CLINIC | Age: 83
End: 2018-09-20
Payer: MEDICARE

## 2018-09-20 VITALS
HEIGHT: 64 IN | WEIGHT: 157 LBS | BODY MASS INDEX: 26.8 KG/M2 | SYSTOLIC BLOOD PRESSURE: 124 MMHG | HEART RATE: 72 BPM | DIASTOLIC BLOOD PRESSURE: 60 MMHG

## 2018-09-20 DIAGNOSIS — N76.0 VAGINITIS AND VULVOVAGINITIS: Primary | ICD-10-CM

## 2018-09-20 PROCEDURE — 99212 OFFICE O/P EST SF 10 MIN: CPT | Performed by: OBSTETRICS & GYNECOLOGY

## 2018-09-20 NOTE — PROGRESS NOTES
Using the prescription cream her itching is minimal now. Pad use was discussed. External genitalia with minimal erythema no skin changes. She has Alcortin A. She will use that 2-3 times a day for a week if not improved will let us know.   She has an exc

## 2018-10-06 ENCOUNTER — HOSPITAL ENCOUNTER (OUTPATIENT)
Dept: CT IMAGING | Facility: HOSPITAL | Age: 83
Discharge: HOME | End: 2018-10-06
Attending: INTERNAL MEDICINE
Payer: MEDICARE

## 2018-10-06 DIAGNOSIS — R91.1 PULMONARY NODULE: ICD-10-CM

## 2018-10-06 PROCEDURE — 71250 CT THORAX DX C-: CPT | Performed by: INTERNAL MEDICINE

## 2018-10-09 ENCOUNTER — HOSPITAL ENCOUNTER (OUTPATIENT)
Age: 83
Discharge: HOME | End: 2018-10-09
Attending: FAMILY MEDICINE
Payer: MEDICARE

## 2018-10-09 VITALS
BODY MASS INDEX: 27.99 KG/M2 | TEMPERATURE: 99 F | RESPIRATION RATE: 16 BRPM | DIASTOLIC BLOOD PRESSURE: 76 MMHG | HEIGHT: 65 IN | OXYGEN SATURATION: 96 % | WEIGHT: 168 LBS | SYSTOLIC BLOOD PRESSURE: 159 MMHG | HEART RATE: 67 BPM

## 2018-10-09 DIAGNOSIS — L56.8 PHOTODERMATITIS: Primary | ICD-10-CM

## 2018-10-09 PROCEDURE — 99213 OFFICE O/P EST LOW 20 MIN: CPT

## 2018-10-09 PROCEDURE — 99204 OFFICE O/P NEW MOD 45 MIN: CPT

## 2018-10-09 RX ORDER — CLOBETASOL PROPIONATE 0.5 MG/G
1 CREAM TOPICAL 2 TIMES DAILY
Qty: 60 G | Refills: 0 | Status: SHIPPED | OUTPATIENT
Start: 2018-10-09 | End: 2019-02-01 | Stop reason: CLARIF

## 2018-10-09 NOTE — ED PROVIDER NOTES
Patient Seen in: [facility]    History   Patient presents with:  Rash Skin Problem (integumentary)    Stated Complaint: rash x 1 day    HPI  Rash around the neck - well circumscribed - area of exposure   Rash on both forear REPAIR N/A 5/18/2016    Procedure: HERNIA VENTRAL REPAIR;  Surgeon: Samuel Meyer MD;  Location: [facility] MAIN OR       Family history reviewed and is not pertinent to presenting problem.     Social History    Tobacco Use      Smoking status: Current Every D 0    Patient verbalized understanding and agreed with the plan.        MDM       Cold compresses   Benadryl 25 mg every 6 hours to help with itching over the next 24-48 hours   Keep nails clipped to avoid scratching - avoid the risk of infection   Rx Clobet

## 2018-10-09 NOTE — ED INITIAL ASSESSMENT (HPI)
The patient states she started to notice a rash to bilateral arms and to the chest since yesterday. The right arm is worse and she feels the rash got worse after applying cetaphil lotion last night.   The rash does itch but denies any pain or other symptom

## 2018-10-19 ENCOUNTER — TELEPHONE (OUTPATIENT)
Dept: RHEUMATOLOGY | Facility: CLINIC | Age: 83
End: 2018-10-19

## 2018-10-19 ENCOUNTER — OFFICE VISIT (OUTPATIENT)
Dept: RHEUMATOLOGY | Facility: CLINIC | Age: 83
End: 2018-10-19
Payer: MEDICARE

## 2018-10-19 VITALS
RESPIRATION RATE: 20 BRPM | HEART RATE: 80 BPM | BODY MASS INDEX: 27.65 KG/M2 | WEIGHT: 168 LBS | HEIGHT: 65.5 IN | DIASTOLIC BLOOD PRESSURE: 84 MMHG | SYSTOLIC BLOOD PRESSURE: 120 MMHG

## 2018-10-19 DIAGNOSIS — M51.36 DEGENERATIVE DISC DISEASE, LUMBAR: Primary | ICD-10-CM

## 2018-10-19 DIAGNOSIS — M17.11 PRIMARY OSTEOARTHRITIS OF RIGHT KNEE: ICD-10-CM

## 2018-10-19 DIAGNOSIS — M25.511 CHRONIC RIGHT SHOULDER PAIN: ICD-10-CM

## 2018-10-19 DIAGNOSIS — M47.816 SPONDYLOSIS OF LUMBAR REGION WITHOUT MYELOPATHY OR RADICULOPATHY: ICD-10-CM

## 2018-10-19 DIAGNOSIS — M17.0 PRIMARY OSTEOARTHRITIS OF BOTH KNEES: ICD-10-CM

## 2018-10-19 DIAGNOSIS — G89.29 CHRONIC RIGHT SHOULDER PAIN: ICD-10-CM

## 2018-10-19 PROCEDURE — 99213 OFFICE O/P EST LOW 20 MIN: CPT | Performed by: INTERNAL MEDICINE

## 2018-10-19 PROCEDURE — 90653 IIV ADJUVANT VACCINE IM: CPT | Performed by: INTERNAL MEDICINE

## 2018-10-19 PROCEDURE — G0008 ADMIN INFLUENZA VIRUS VAC: HCPCS | Performed by: INTERNAL MEDICINE

## 2018-10-19 RX ORDER — HYDROCODONE BITARTRATE AND ACETAMINOPHEN 5; 325 MG/1; MG/1
1 TABLET ORAL EVERY 6 HOURS PRN
Qty: 120 TABLET | Refills: 0 | Status: ON HOLD | OUTPATIENT
Start: 2018-12-18 | End: 2018-11-19

## 2018-10-19 RX ORDER — LORAZEPAM 0.5 MG/1
0.5 TABLET ORAL EVERY 6 HOURS PRN
Qty: 100 TABLET | Refills: 1 | Status: SHIPPED | OUTPATIENT
Start: 2018-10-19 | End: 2019-01-21

## 2018-10-19 RX ORDER — HYDROCODONE BITARTRATE AND ACETAMINOPHEN 5; 325 MG/1; MG/1
1 TABLET ORAL EVERY 6 HOURS PRN
Qty: 120 TABLET | Refills: 0 | Status: SHIPPED | OUTPATIENT
Start: 2018-11-18 | End: 2018-12-18

## 2018-10-19 RX ORDER — HYDROCODONE BITARTRATE AND ACETAMINOPHEN 5; 325 MG/1; MG/1
1 TABLET ORAL EVERY 6 HOURS PRN
Qty: 120 TABLET | Refills: 0 | Status: ON HOLD | OUTPATIENT
Start: 2018-10-19 | End: 2018-11-19

## 2018-10-19 RX ORDER — CYCLOBENZAPRINE HCL 5 MG
5 TABLET ORAL 3 TIMES DAILY PRN
Qty: 90 TABLET | Refills: 2 | Status: SHIPPED | OUTPATIENT
Start: 2018-10-19 | End: 2019-02-01 | Stop reason: CLARIF

## 2018-10-19 NOTE — PATIENT INSTRUCTIONS
For arthritis, use Celebrex 200 mg one every morning after breakfast.  Use Norco for pain 5 mg one every so often, every 6 hours, up to 4 in 24 hours. Use cyclobenzaprine as a muscle relaxant once or twice a day.   Use Lorazepam for stress and anxiety one

## 2018-10-19 NOTE — PROGRESS NOTES
[facility]  Dr. Chio Alberto Progress Note     Subjective:   Chepe Cardoza is a(n) 81year old female.    Current complaints: Patient presents with:  Osteoarthritis: 4 month f/u, doing ok with arthritis, walking not improving but pain about the same, concern

## 2018-11-10 ENCOUNTER — HOSPITAL ENCOUNTER (INPATIENT)
Facility: HOSPITAL | Age: 83
LOS: 9 days | Discharge: HOME | DRG: 191 | End: 2018-11-19
Attending: EMERGENCY MEDICINE | Admitting: FAMILY MEDICINE
Payer: MEDICARE

## 2018-11-10 ENCOUNTER — APPOINTMENT (OUTPATIENT)
Dept: GENERAL RADIOLOGY | Facility: HOSPITAL | Age: 83
DRG: 191 | End: 2018-11-10
Attending: EMERGENCY MEDICINE
Payer: MEDICARE

## 2018-11-10 ENCOUNTER — APPOINTMENT (OUTPATIENT)
Dept: GENERAL RADIOLOGY | Facility: HOSPITAL | Age: 83
DRG: 191 | End: 2018-11-10
Attending: FAMILY MEDICINE
Payer: MEDICARE

## 2018-11-10 DIAGNOSIS — J44.1 COPD EXACERBATION (HCC): Primary | ICD-10-CM

## 2018-11-10 PROCEDURE — 94640 AIRWAY INHALATION TREATMENT: CPT

## 2018-11-10 PROCEDURE — 87633 RESP VIRUS 12-25 TARGETS: CPT | Performed by: EMERGENCY MEDICINE

## 2018-11-10 PROCEDURE — 87798 DETECT AGENT NOS DNA AMP: CPT | Performed by: EMERGENCY MEDICINE

## 2018-11-10 PROCEDURE — 99285 EMERGENCY DEPT VISIT HI MDM: CPT

## 2018-11-10 PROCEDURE — 80053 COMPREHEN METABOLIC PANEL: CPT | Performed by: EMERGENCY MEDICINE

## 2018-11-10 PROCEDURE — 71046 X-RAY EXAM CHEST 2 VIEWS: CPT | Performed by: EMERGENCY MEDICINE

## 2018-11-10 PROCEDURE — 85025 COMPLETE CBC W/AUTO DIFF WBC: CPT | Performed by: EMERGENCY MEDICINE

## 2018-11-10 PROCEDURE — 94644 CONT INHLJ TX 1ST HOUR: CPT

## 2018-11-10 PROCEDURE — 87486 CHLMYD PNEUM DNA AMP PROBE: CPT | Performed by: EMERGENCY MEDICINE

## 2018-11-10 PROCEDURE — 36415 COLL VENOUS BLD VENIPUNCTURE: CPT

## 2018-11-10 PROCEDURE — 87581 M.PNEUMON DNA AMP PROBE: CPT | Performed by: EMERGENCY MEDICINE

## 2018-11-10 RX ORDER — PREDNISONE 20 MG/1
60 TABLET ORAL ONCE
Status: DISCONTINUED | OUTPATIENT
Start: 2018-11-10 | End: 2018-11-10

## 2018-11-10 RX ORDER — FAMOTIDINE 20 MG/1
40 TABLET ORAL DAILY
Status: DISCONTINUED | OUTPATIENT
Start: 2018-11-10 | End: 2018-11-11

## 2018-11-10 RX ORDER — LEVOFLOXACIN 5 MG/ML
500 INJECTION, SOLUTION INTRAVENOUS EVERY 24 HOURS
Status: DISCONTINUED | OUTPATIENT
Start: 2018-11-10 | End: 2018-11-10 | Stop reason: DRUGHIGH

## 2018-11-10 RX ORDER — GABAPENTIN 300 MG/1
300 CAPSULE ORAL NIGHTLY
Status: DISCONTINUED | OUTPATIENT
Start: 2018-11-10 | End: 2018-11-19

## 2018-11-10 RX ORDER — ATORVASTATIN CALCIUM 20 MG/1
20 TABLET, FILM COATED ORAL NIGHTLY
Status: DISCONTINUED | OUTPATIENT
Start: 2018-11-11 | End: 2018-11-19

## 2018-11-10 RX ORDER — ENOXAPARIN SODIUM 100 MG/ML
40 INJECTION SUBCUTANEOUS DAILY
Status: DISCONTINUED | OUTPATIENT
Start: 2018-11-10 | End: 2018-11-19

## 2018-11-10 RX ORDER — LOSARTAN POTASSIUM 50 MG/1
50 TABLET ORAL DAILY
Status: DISCONTINUED | OUTPATIENT
Start: 2018-11-11 | End: 2018-11-19

## 2018-11-10 RX ORDER — IPRATROPIUM BROMIDE AND ALBUTEROL SULFATE 2.5; .5 MG/3ML; MG/3ML
3 SOLUTION RESPIRATORY (INHALATION)
Status: DISCONTINUED | OUTPATIENT
Start: 2018-11-10 | End: 2018-11-10

## 2018-11-10 RX ORDER — HYDROCODONE BITARTRATE AND ACETAMINOPHEN 5; 325 MG/1; MG/1
1 TABLET ORAL EVERY 6 HOURS PRN
Status: DISCONTINUED | OUTPATIENT
Start: 2018-11-10 | End: 2018-11-19

## 2018-11-10 RX ORDER — LORAZEPAM 0.5 MG/1
0.5 TABLET ORAL EVERY 6 HOURS PRN
Status: DISCONTINUED | OUTPATIENT
Start: 2018-11-10 | End: 2018-11-19

## 2018-11-10 RX ORDER — PAROXETINE HYDROCHLORIDE 20 MG/1
40 TABLET, FILM COATED ORAL DAILY
Status: DISCONTINUED | OUTPATIENT
Start: 2018-11-10 | End: 2018-11-19

## 2018-11-10 RX ORDER — SODIUM CHLORIDE 9 MG/ML
INJECTION, SOLUTION INTRAVENOUS CONTINUOUS
Status: DISCONTINUED | OUTPATIENT
Start: 2018-11-10 | End: 2018-11-12

## 2018-11-10 RX ORDER — METHYLPREDNISOLONE SODIUM SUCCINATE 125 MG/2ML
60 INJECTION, POWDER, LYOPHILIZED, FOR SOLUTION INTRAMUSCULAR; INTRAVENOUS EVERY 8 HOURS
Status: DISCONTINUED | OUTPATIENT
Start: 2018-11-10 | End: 2018-11-11

## 2018-11-10 RX ORDER — IPRATROPIUM BROMIDE AND ALBUTEROL SULFATE 2.5; .5 MG/3ML; MG/3ML
3 SOLUTION RESPIRATORY (INHALATION)
Status: DISCONTINUED | OUTPATIENT
Start: 2018-11-10 | End: 2018-11-12

## 2018-11-10 RX ORDER — TRIAMTERENE AND HYDROCHLOROTHIAZIDE 37.5; 25 MG/1; MG/1
1 CAPSULE ORAL DAILY
Status: DISCONTINUED | OUTPATIENT
Start: 2018-11-11 | End: 2018-11-19

## 2018-11-10 RX ORDER — IPRATROPIUM BROMIDE AND ALBUTEROL SULFATE 2.5; .5 MG/3ML; MG/3ML
SOLUTION RESPIRATORY (INHALATION)
Status: COMPLETED
Start: 2018-11-10 | End: 2018-11-10

## 2018-11-10 RX ORDER — PREDNISONE 20 MG/1
40 TABLET ORAL ONCE
Status: COMPLETED | OUTPATIENT
Start: 2018-11-10 | End: 2018-11-10

## 2018-11-10 RX ORDER — LEVOFLOXACIN 5 MG/ML
750 INJECTION, SOLUTION INTRAVENOUS EVERY 24 HOURS
Status: DISCONTINUED | OUTPATIENT
Start: 2018-11-10 | End: 2018-11-11

## 2018-11-10 RX ORDER — ACETAMINOPHEN 325 MG/1
650 TABLET ORAL EVERY 6 HOURS PRN
Status: DISCONTINUED | OUTPATIENT
Start: 2018-11-10 | End: 2018-11-19

## 2018-11-10 NOTE — ED INITIAL ASSESSMENT (HPI)
Pt here for increased shortness of breath. Pt took prednisone this am. Pt has existing cough. Pt denies fever.

## 2018-11-10 NOTE — H&P
[facility]  History & Physical    Kaiden Hermann Patient Status:  Observation    1935 MRN [id number]   [facility] 5NW-A Attending Austin Shaffer MD   Hosp Day # 0 PCP Maci Castrejon MD     History of Present Illness:  [facility] MAIN OR   • LUMBAR FACET INJECTION Left 6/15/2015    Performed by Armando Brown MD at [facility] MAIN OR   • OOPHORECTOMY     • OTHER SURGICAL HISTORY      Drain for abdominal abscess   • RADIOFREQUENCY ABLATION OF SACROILIAC JOINT Right 2/9/2017    Performed time   PATIENT SUPPLIED MEDICATION  Disp:  Rfl:  Past Month at Unknown time   ALCORTIN A 1-2-1 % External Gel Apply 1 Application topically 2 (two) times daily.  Disp: 48 g Rfl: 1 Past Month at Unknown time   gabapentin 300 MG Oral Cap  Disp:  Rfl: 4 Past W Temp 98.4 °F (36.9 °C) (Oral)   Resp 20   Ht 165.1 cm (5' 5\")   Wt 160 lb (72.6 kg)   SpO2 91%   BMI 26.63 kg/m²     General Appearance:    Alert, cooperative, no distress, appears stated age   Head:    Normocephalic, without obvious abnormality, atraumat 11/10/2018    ALB 3.6 11/10/2018    ALKPHO 71 11/10/2018    BILT 0.3 11/10/2018    TP 7.4 11/10/2018    AST 21 11/10/2018    ALT 20 11/10/2018       Imaging:  X-Ray    Assessment:  Patient Active Problem List:     HTN (hypertension)     Anxiety     Tobacco

## 2018-11-10 NOTE — ED PROVIDER NOTES
Patient Seen in: [facility] Emergency Department    History   Patient presents with:  Fever (infectious)  Dyspnea BECKI SOB (respiratory)  Cough/URI    Stated Complaint: fever; cough, becki    HPI    64-year-old female presents for evaluation of cough a abdominal abscess   • RADIOFREQUENCY ABLATION OF SACROILIAC JOINT Right 2/9/2017    [facility] Right 2/16/2017    Performed by Armando Brown MD at [facility] METABOLIC PANEL (14)   CBC WITH DIFFERENTIAL WITH PLATELET   RESPIRATORY PANEL FLU EXPANDED           MDM       Medications   ipratropium-albuterol (DUONEB) nebulizer solution 3 mL (3 mL Nebulization Given 11/10/18 1037)   predniSONE (DELTASONE) tab 40 mg

## 2018-11-10 NOTE — CONSULTS
[facility]  Report of Consultation    Vaibhav Figueroa Patient Status:  Observation    1935 MRN [id number]   [facility] 5NW-A Attending Romel Chavez MD   Hosp Day # 0 PCP Eric Dietrich MD     Reason for Consultation:  Tiffanie Plata alcohol. She reports that she does not use drugs.     Allergies:    Sulfa Antibiotics       HIVES  Latex                   RASH    Medications:    Medications Prior to Admission:  HYDROcodone-acetaminophen (NORCO) 5-325 MG Oral Tab Take 1 tablet by mouth ev lungs 2 (two) times daily as needed. Disp:  Rfl:  11/9/2018 at Unknown time   Triamterene-HCTZ 37.5-25 MG Oral Tab Take 1 tablet by mouth daily. Disp:  Rfl:  11/10/2018 at Unknown time   simvastatin (ZOCOR) 20 MG Oral Tab Take 20 mg by mouth nightly.    D kg)         Physical Exam:   General: alert, cooperative, oriented. No respiratory distress. But with a very harsh cough   Head: Normocephalic, without obvious abnormality, atraumatic.    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

## 2018-11-11 ENCOUNTER — APPOINTMENT (OUTPATIENT)
Dept: GENERAL RADIOLOGY | Facility: HOSPITAL | Age: 83
DRG: 191 | End: 2018-11-11
Attending: INTERNAL MEDICINE
Payer: MEDICARE

## 2018-11-11 PROCEDURE — 85025 COMPLETE CBC W/AUTO DIFF WBC: CPT | Performed by: FAMILY MEDICINE

## 2018-11-11 PROCEDURE — 83735 ASSAY OF MAGNESIUM: CPT | Performed by: INTERNAL MEDICINE

## 2018-11-11 PROCEDURE — 96376 TX/PRO/DX INJ SAME DRUG ADON: CPT

## 2018-11-11 PROCEDURE — 94640 AIRWAY INHALATION TREATMENT: CPT

## 2018-11-11 PROCEDURE — 71045 X-RAY EXAM CHEST 1 VIEW: CPT | Performed by: INTERNAL MEDICINE

## 2018-11-11 PROCEDURE — 80053 COMPREHEN METABOLIC PANEL: CPT | Performed by: FAMILY MEDICINE

## 2018-11-11 PROCEDURE — 94664 DEMO&/EVAL PT USE INHALER: CPT

## 2018-11-11 PROCEDURE — 96375 TX/PRO/DX INJ NEW DRUG ADDON: CPT

## 2018-11-11 RX ORDER — FAMOTIDINE 20 MG/1
20 TABLET ORAL DAILY
Status: DISCONTINUED | OUTPATIENT
Start: 2018-11-12 | End: 2018-11-19

## 2018-11-11 RX ORDER — CODEINE PHOSPHATE AND GUAIFENESIN 10; 100 MG/5ML; MG/5ML
5 SOLUTION ORAL EVERY 4 HOURS PRN
Status: DISCONTINUED | OUTPATIENT
Start: 2018-11-11 | End: 2018-11-15

## 2018-11-11 RX ORDER — NICOTINE 21 MG/24HR
1 PATCH, TRANSDERMAL 24 HOURS TRANSDERMAL DAILY
Status: DISCONTINUED | OUTPATIENT
Start: 2018-11-11 | End: 2018-11-19

## 2018-11-11 RX ORDER — LEVOFLOXACIN 5 MG/ML
750 INJECTION, SOLUTION INTRAVENOUS
Status: DISCONTINUED | OUTPATIENT
Start: 2018-11-12 | End: 2018-11-12

## 2018-11-11 RX ORDER — BENZONATATE 100 MG/1
100 CAPSULE ORAL 3 TIMES DAILY PRN
Status: DISCONTINUED | OUTPATIENT
Start: 2018-11-11 | End: 2018-11-17

## 2018-11-11 RX ORDER — METHYLPREDNISOLONE SODIUM SUCCINATE 40 MG/ML
40 INJECTION, POWDER, LYOPHILIZED, FOR SOLUTION INTRAMUSCULAR; INTRAVENOUS EVERY 8 HOURS
Status: COMPLETED | OUTPATIENT
Start: 2018-11-11 | End: 2018-11-12

## 2018-11-11 NOTE — PROGRESS NOTES
NURSING ADMISSION NOTE      Patient admitted via Wheelchair  Oriented to room. Safety precautions initiated. Bed in low position. Call light in reach. Pt AOx4. VSS on RA. Admission navigator completed w pt. Ivf. Pt c.o SOB, Neb given.  Family an

## 2018-11-11 NOTE — PROGRESS NOTES
[facility] Pharmacy Note: Renal dose adjustment for Famotidine (Pepcid)  Jeniffer Scanlon has been prescribed Famotidine (Pepcid) 40 mg every 24 hours. Estimated Creatinine Clearance: 46.5 mL/min (based on SCr of 0.84 mg/dL).     Her calculated creatinine cl

## 2018-11-11 NOTE — PROGRESS NOTES
Pharmacy Note: Renal dose adjustment of Levaquin    Chepe Cardoza is a 81year old female who has been prescribed Levaquin 750 mg IV every 24 hours.   CrCl is 46.5 ml/min so the dose has been adjusted  to Levaquin 750 mg IV every 48 hours per hospital renal

## 2018-11-11 NOTE — PROGRESS NOTES
Assumed care at . Patient is alert and oriented x4, can be forgetful at times. Denies any pain. Patient is stating that she feels tired and weak. Room air, pulse oximeter on, saturations 92-98%. Lung sounds clear, but diminished at the bases.  Patient d

## 2018-11-11 NOTE — PROGRESS NOTES
[facility]  Progress Note    Rahel Toney Patient Status:  Observation    1935 MRN [id number]   [facility] 5NW-A Attending Elda Vail MD   Hosp Day # 0 PCP Jane Oden MD       SUBJECTIVE:  No CP,   Remains sob  dyspnic tab 0.5 mg 0.5 mg Oral Q6H PRN   Losartan Potassium (COZAAR) tab 50 mg 50 mg Oral Daily   PARoxetine HCl (PAXIL) tab 40 mg 40 mg Oral Daily   atorvastatin (LIPITOR) tab 20 mg 20 mg Oral Nightly   Triamterene-HCTZ (DYAZIDE) 37.5-25 MG per cap 1 capsule 1 ca

## 2018-11-11 NOTE — PROGRESS NOTES
[facility] Pulmonary/Critical Care Progress Note     SUBJECTIVE/24H Events: All events, procedures, notes reviewed. No acute events, feels mildly better today, still with dry cough and fatigue, dyspnea improved.  No f/cs 0. 74  0.84   GFRAA  87  75   GFRNAA  76  65   CA  8.8  8.9   NA  130*  133*   K  4.2  5.1   CL  97*  97*   CO2  26.0  28.0     Recent Labs   Lab  11/10/18   1258  11/11/18   0726   RBC  5.06  4.73   HGB  14.9  13.7   HCT  43.3  41.0   MCV  85.6  86.7   MC Inhalation Daily   • gabapentin  300 mg Oral Nightly   • Losartan Potassium  50 mg Oral Daily   • PARoxetine HCl  40 mg Oral Daily   • atorvastatin  20 mg Oral Nightly   • Triamterene-HCTZ  1 capsule Oral Daily   • enoxaparin  40 mg Subcutaneous Daily   •

## 2018-11-11 NOTE — PLAN OF CARE
ANXIETY    • Will report anxiety at manageable levels Progressing        RESPIRATORY - ADULT    • Achieves optimal ventilation and oxygenation Progressing          PROBLEM:COPD    ASSESSMENT:PT IS ALERT TIMES 4, FORGETFUL, ANXIOUS, ON RA, HAS A NONPRODUCTI

## 2018-11-11 NOTE — CONSULTS
[facility] Pharmacy Note:  Renal Adjustment for levofloxacin (Dyke Fret)    Thelma Martinez is a 81year old female who has been prescribed levofloxacin (LEVAQUIN) 500 mg every 24 hrs.   CrCl is estimated creatinine clearance is 52.7 mL/min (based on SCr of 0.74 mg/dL

## 2018-11-11 NOTE — PLAN OF CARE
ANXIETY    • Will report anxiety at manageable levels Progressing        RESPIRATORY - ADULT    • Achieves optimal ventilation and oxygenation Progressing            Pt AOx4, very forgetful. VSS on RA. Tele. E.p. Voids. Pt c.o cough, see orders.  Up 1A and

## 2018-11-12 ENCOUNTER — APPOINTMENT (OUTPATIENT)
Dept: GENERAL RADIOLOGY | Facility: HOSPITAL | Age: 83
DRG: 191 | End: 2018-11-12
Attending: FAMILY MEDICINE
Payer: MEDICARE

## 2018-11-12 ENCOUNTER — APPOINTMENT (OUTPATIENT)
Dept: CT IMAGING | Facility: HOSPITAL | Age: 83
DRG: 191 | End: 2018-11-12
Attending: FAMILY MEDICINE
Payer: MEDICARE

## 2018-11-12 PROCEDURE — 80053 COMPREHEN METABOLIC PANEL: CPT | Performed by: FAMILY MEDICINE

## 2018-11-12 PROCEDURE — 85025 COMPLETE CBC W/AUTO DIFF WBC: CPT | Performed by: FAMILY MEDICINE

## 2018-11-12 PROCEDURE — 82962 GLUCOSE BLOOD TEST: CPT

## 2018-11-12 PROCEDURE — 71275 CT ANGIOGRAPHY CHEST: CPT | Performed by: FAMILY MEDICINE

## 2018-11-12 PROCEDURE — 94640 AIRWAY INHALATION TREATMENT: CPT

## 2018-11-12 PROCEDURE — 71045 X-RAY EXAM CHEST 1 VIEW: CPT | Performed by: FAMILY MEDICINE

## 2018-11-12 PROCEDURE — 84145 PROCALCITONIN (PCT): CPT | Performed by: INTERNAL MEDICINE

## 2018-11-12 RX ORDER — IPRATROPIUM BROMIDE AND ALBUTEROL SULFATE 2.5; .5 MG/3ML; MG/3ML
3 SOLUTION RESPIRATORY (INHALATION) ONCE
Status: COMPLETED | OUTPATIENT
Start: 2018-11-12 | End: 2018-11-12

## 2018-11-12 RX ORDER — BENZONATATE 100 MG/1
100 CAPSULE ORAL 3 TIMES DAILY
Status: DISCONTINUED | OUTPATIENT
Start: 2018-11-12 | End: 2018-11-17

## 2018-11-12 RX ORDER — PREDNISONE 20 MG/1
40 TABLET ORAL
Status: DISCONTINUED | OUTPATIENT
Start: 2018-11-13 | End: 2018-11-14

## 2018-11-12 RX ORDER — IPRATROPIUM BROMIDE AND ALBUTEROL SULFATE 2.5; .5 MG/3ML; MG/3ML
3 SOLUTION RESPIRATORY (INHALATION)
Status: DISCONTINUED | OUTPATIENT
Start: 2018-11-12 | End: 2018-11-14

## 2018-11-12 RX ORDER — GUAIFENESIN 600 MG
600 TABLET, EXTENDED RELEASE 12 HR ORAL 2 TIMES DAILY
Status: DISCONTINUED | OUTPATIENT
Start: 2018-11-12 | End: 2018-11-19

## 2018-11-12 NOTE — PLAN OF CARE
Patient/Family Goals    • Patient/Family Long Term Goal Progressing    • Patient/Family Short Term Goal Progressing        RESPIRATORY - ADULT    • Achieves optimal ventilation and oxygenation Progressing        Problem: COPD exacerbation  Data: Patient al

## 2018-11-12 NOTE — PHYSICAL THERAPY NOTE
PT attempted to see the pt for an eval this pm, however pt reporting she is exhausted and requested this writer return tomorrow. Will re-attempt on 11/13/18.

## 2018-11-12 NOTE — PROGRESS NOTES
[facility] Pulmonary/Critical Care Progress Note     SUBJECTIVE/24H Events: All events, procedures, notes reviewed. Worsened coughing this am, productive of white phlegm, no hemoptysis.  Dyspnea improved, no f/c/s    Rev 0726   GLU  118*  135*   BUN  12  12   CREATSERUM  0.74  0.84   GFRAA  87  75   GFRNAA  76  65   CA  8.8  8.9   NA  130*  133*   K  4.2  5.1   CL  97*  97*   CO2  26.0  28.0     Recent Labs   Lab  11/10/18   1258  11/11/18   0726   RBC  5.06  4.73   HGB  1 Succ  40 mg Intravenous Q8H   • levofloxacin  750 mg Intravenous Q48H   • famotidine  20 mg Oral Daily   • Fluticasone Furoate-Vilanterol  1 puff Inhalation Daily   • gabapentin  300 mg Oral Nightly   • Losartan Potassium  50 mg Oral Daily   • PARoxetine H

## 2018-11-13 ENCOUNTER — APPOINTMENT (OUTPATIENT)
Dept: GENERAL RADIOLOGY | Facility: HOSPITAL | Age: 83
DRG: 191 | End: 2018-11-13
Attending: FAMILY MEDICINE
Payer: MEDICARE

## 2018-11-13 PROCEDURE — 71046 X-RAY EXAM CHEST 2 VIEWS: CPT | Performed by: INTERNAL MEDICINE

## 2018-11-13 PROCEDURE — 94640 AIRWAY INHALATION TREATMENT: CPT

## 2018-11-13 PROCEDURE — 80053 COMPREHEN METABOLIC PANEL: CPT | Performed by: FAMILY MEDICINE

## 2018-11-13 PROCEDURE — 94664 DEMO&/EVAL PT USE INHALER: CPT

## 2018-11-13 PROCEDURE — 97161 PT EVAL LOW COMPLEX 20 MIN: CPT

## 2018-11-13 PROCEDURE — 85025 COMPLETE CBC W/AUTO DIFF WBC: CPT | Performed by: FAMILY MEDICINE

## 2018-11-13 RX ORDER — FUROSEMIDE 10 MG/ML
20 INJECTION INTRAMUSCULAR; INTRAVENOUS ONCE
Status: COMPLETED | OUTPATIENT
Start: 2018-11-13 | End: 2018-11-13

## 2018-11-13 NOTE — PLAN OF CARE
ANXIETY    • Will report anxiety at manageable levels Progressing        Patient/Family Goals    • Patient/Family Long Term Goal Progressing    • Patient/Family Short Term Goal Progressing        RESPIRATORY - ADULT    • Achieves optimal ventilation and ox

## 2018-11-13 NOTE — PROGRESS NOTES
[facility]  Progress Note    Kamille Meadows Patient Status:  Inpatient    1935 MRN [id number]   [facility] 5NW-A Attending Bárbara Harris MD   Hosp Day # 3 PCP Faviola Fang MD       SUBJECTIVE:  No CP, or N/V.   Uncontrolled co GuaiFENesin ER (MUCINEX) 12 hr tab 600 mg 600 mg Oral BID   benzonatate (TESSALON) cap 100 mg 100 mg Oral TID   benzonatate (TESSALON) cap 100 mg 100 mg Oral TID PRN   guaiFENesin-codeine (ROBITUSSIN AC) 100-10 MG/5ML syrup 5 mL 5 mL Oral Q4H PRN   nicot  EMPERIC COVERAGE  3  HTN  4  ANXIETY  5  SPINAL STENOSIS  6  INTRACTABLE COUGH         IV STEROIDS DC TO ORAL PRED  NEBS Q 4  LEVAQUIN  ATIVAN PRN  CULTURE VIRAL AND BLOOD neg so far  CONTINUE TREATMENT   LUNGS IMPROVED FROM YESTERDAY   SINCE PATIENT WORS

## 2018-11-13 NOTE — CM/SW NOTE
11/13/18 1400   CM/SW Referral Data   Referral Source    Reason for Referral Discharge planning   Informant Patient   Pertinent Medical Hx   Primary Care Physician Name Dr. Kasey Echevarria   Patient Info   Patient's Mental Status Alert;Oriented   [facility]

## 2018-11-13 NOTE — PHYSICAL THERAPY NOTE
PHYSICAL THERAPY QUICK EVALUATION - INPATIENT    Room Number: 783/197-E  Evaluation Date: 11/13/2018  Presenting Problem: SOB and cough  Physician Order: PT Eval and Treat    Problem List  Principal Problem:    COPD exacerbation [facility])      Past Medical H Oscar Sewell MD;  Location: [facility] MAIN OR       HOME SITUATION  Type of Home: Condo   Home Layout: One level                Lives With: Spouse  Drives: Yes  Patient Owned Equipment: Rolling walker  Patient Regularly Uses: Reading glasses    Prior Level of Indepen Impairment Score: 0%   Standardized Score (AM-PAC Scale): 61.14   CMS Modifier (G-Code): CH      FUNCTIONAL ABILITY STATUS  Gait Assessment  Gait Assistance: Modified independent  Distance (ft): 200  Assistive Device: Rolling walker  Pattern: L Decreased s time.  Patient discharged from Physical Therapy services. Please re-order if a new functional limitation presents during this admission. GOALS  Patient was able to achieve the following goals . ..     Patient was able to transfer Safely and at [address] [place] [address] Street

## 2018-11-13 NOTE — PROGRESS NOTES
[facility]  Progress Note    Anderson Buerger Patient Status:  Inpatient    1935 MRN [id number]   [facility] 5NW-A Attending China Irving MD   Hosp Day # 3 PCP Eboni George MD   :   [facility]  Progress Note    Tawanda Pryor Intravenous Once   ipratropium-albuterol (DUONEB) nebulizer solution 3 mL 3 mL Nebulization QID   predniSONE (DELTASONE) tab 40 mg 40 mg Oral Daily with breakfast   GuaiFENesin ER (MUCINEX) 12 hr tab 600 mg 600 mg Oral BID   benzonatate (TESSALON) cap 100 pain     Primary osteoarthritis of right knee     Acute right-sided low back pain without sciatica     COPD exacerbation ([address]        Plan:   1  COPD EXACERBATION  2  EMPERIC COVERAGE  3  HTN  4  ANXIETY  5  SPINAL STENOSIS  6  INTRACTABLE COUGH   7 HIATAL

## 2018-11-13 NOTE — PLAN OF CARE
RESPIRATORY - ADULT    • Achieves optimal ventilation and oxygenation Progressing          ANXIETY    • Will report anxiety at manageable levels Progressing          PROBLEM: COPD    ASSESSMENT: Pt is A&Ox3 - very forgetful. Ativan PRN for anxiety.  Martha Boo

## 2018-11-14 ENCOUNTER — APPOINTMENT (OUTPATIENT)
Dept: CV DIAGNOSTICS | Facility: HOSPITAL | Age: 83
DRG: 191 | End: 2018-11-14
Attending: FAMILY MEDICINE
Payer: MEDICARE

## 2018-11-14 ENCOUNTER — APPOINTMENT (OUTPATIENT)
Dept: GENERAL RADIOLOGY | Facility: HOSPITAL | Age: 83
DRG: 191 | End: 2018-11-14
Attending: FAMILY MEDICINE
Payer: MEDICARE

## 2018-11-14 ENCOUNTER — APPOINTMENT (OUTPATIENT)
Dept: GENERAL RADIOLOGY | Facility: HOSPITAL | Age: 83
DRG: 191 | End: 2018-11-14
Attending: INTERNAL MEDICINE
Payer: MEDICARE

## 2018-11-14 PROCEDURE — 71046 X-RAY EXAM CHEST 2 VIEWS: CPT | Performed by: INTERNAL MEDICINE

## 2018-11-14 PROCEDURE — 93306 TTE W/DOPPLER COMPLETE: CPT | Performed by: FAMILY MEDICINE

## 2018-11-14 PROCEDURE — 94640 AIRWAY INHALATION TREATMENT: CPT

## 2018-11-14 PROCEDURE — 71045 X-RAY EXAM CHEST 1 VIEW: CPT | Performed by: FAMILY MEDICINE

## 2018-11-14 PROCEDURE — 85025 COMPLETE CBC W/AUTO DIFF WBC: CPT | Performed by: FAMILY MEDICINE

## 2018-11-14 PROCEDURE — 83880 ASSAY OF NATRIURETIC PEPTIDE: CPT | Performed by: FAMILY MEDICINE

## 2018-11-14 RX ORDER — METHYLPREDNISOLONE SODIUM SUCCINATE 40 MG/ML
40 INJECTION, POWDER, LYOPHILIZED, FOR SOLUTION INTRAMUSCULAR; INTRAVENOUS EVERY 8 HOURS
Status: COMPLETED | OUTPATIENT
Start: 2018-11-14 | End: 2018-11-15

## 2018-11-14 RX ORDER — PREDNISONE 20 MG/1
40 TABLET ORAL
Status: DISCONTINUED | OUTPATIENT
Start: 2018-11-15 | End: 2018-11-17

## 2018-11-14 RX ORDER — IPRATROPIUM BROMIDE AND ALBUTEROL SULFATE 2.5; .5 MG/3ML; MG/3ML
3 SOLUTION RESPIRATORY (INHALATION)
Status: DISCONTINUED | OUTPATIENT
Start: 2018-11-14 | End: 2018-11-14

## 2018-11-14 RX ORDER — IPRATROPIUM BROMIDE AND ALBUTEROL SULFATE 2.5; .5 MG/3ML; MG/3ML
3 SOLUTION RESPIRATORY (INHALATION) EVERY 6 HOURS PRN
Status: DISCONTINUED | OUTPATIENT
Start: 2018-11-14 | End: 2018-11-19

## 2018-11-14 RX ORDER — IPRATROPIUM BROMIDE AND ALBUTEROL SULFATE 2.5; .5 MG/3ML; MG/3ML
SOLUTION RESPIRATORY (INHALATION)
Status: DISPENSED
Start: 2018-11-14 | End: 2018-11-14

## 2018-11-14 RX ORDER — IPRATROPIUM BROMIDE AND ALBUTEROL SULFATE 2.5; .5 MG/3ML; MG/3ML
3 SOLUTION RESPIRATORY (INHALATION)
Status: DISCONTINUED | OUTPATIENT
Start: 2018-11-14 | End: 2018-11-19

## 2018-11-14 NOTE — PROGRESS NOTES
[facility]  Progress Note    Thelma Martinez Patient Status:  Inpatient    1935 MRN [id number]   [facility] 5NW-A Attending Hema Syed MD   [facility] Day # 4 PCP Tayler Linares MD     Subjective:  Thelma Martinez is a(n) 81year old abscess due to diverticulitis     Diverticulosis     Fistula     Pain management     Pericardial effusion     Pericarditis     At risk for falling     Spondylosis of lumbar region without myelopathy or radiculopathy     Primary osteoarthritis of both knees

## 2018-11-14 NOTE — PLAN OF CARE
RESPIRATORY - ADULT    • Achieves optimal ventilation and oxygenation Progressing          ANXIETY    • Will report anxiety at manageable levels Progressing          PROBLEM: COPD     ASSESSMENT: Pt is A&Ox3 - very forgetful. Ativan PRN for anxiety.  Lashonda Shah

## 2018-11-14 NOTE — PLAN OF CARE
ANXIETY    • Will report anxiety at manageable levels Progressing        Impaired Functional Mobility    • Achieve highest/safest level of mobility/gait Progressing        Patient/Family Goals    • Patient/Family Long Term Goal Progressing    • Patient/Fam

## 2018-11-15 ENCOUNTER — APPOINTMENT (OUTPATIENT)
Dept: GENERAL RADIOLOGY | Facility: HOSPITAL | Age: 83
DRG: 191 | End: 2018-11-15
Attending: FAMILY MEDICINE
Payer: MEDICARE

## 2018-11-15 PROCEDURE — 80053 COMPREHEN METABOLIC PANEL: CPT | Performed by: FAMILY MEDICINE

## 2018-11-15 PROCEDURE — 85025 COMPLETE CBC W/AUTO DIFF WBC: CPT | Performed by: INTERNAL MEDICINE

## 2018-11-15 PROCEDURE — 71045 X-RAY EXAM CHEST 1 VIEW: CPT | Performed by: FAMILY MEDICINE

## 2018-11-15 PROCEDURE — 94640 AIRWAY INHALATION TREATMENT: CPT

## 2018-11-15 RX ORDER — DIPHENHYDRAMINE HCL 25 MG
25 CAPSULE ORAL EVERY 6 HOURS PRN
Status: DISCONTINUED | OUTPATIENT
Start: 2018-11-15 | End: 2018-11-19

## 2018-11-15 NOTE — PROGRESS NOTES
[facility]  Progress Note    Chica  Patient Status:  Inpatient    1935 MRN [id number]   [facility] 5NW-A Attending Lorna Callaway MD   Hosp Day # 4 PCP Kalli Dawn MD       SUBJECTIVE:  No CP, SOB, or N/V.  COUGHING Furoate-Vilanterol (BREO ELLIPTA) 200-25 MCG/INH inhaler 1 puff 1 puff Inhalation Daily   gabapentin (NEURONTIN) cap 300 mg 300 mg Oral Nightly   HYDROcodone-acetaminophen (NORCO) 5-325 MG per tab 1 tablet 1 tablet Oral Q6H PRN   LORazepam (ATIVAN) tab 0.5

## 2018-11-15 NOTE — CM/SW NOTE
Care Management/BPCI:    Met with patient at bedside to explain the BPCI/Medicare program. Patient agreed with phone f/u for 3 months from 0 [facility] after discharge from [facility]. Patient was enrolled under .  BPCI/Medicare Letter and [employer]

## 2018-11-15 NOTE — PROGRESS NOTES
[facility]  Progress Note    Margaret Link Patient Status:  Inpatient    1935 MRN [id number]   [facility] 5NW-A Attending Sophie Keating MD   Hosp Day # 5 PCP Shade Shaw MD     Subjective:  Margaret Link is a(n) 81year old of lumbar region without myelopathy or radiculopathy     Primary osteoarthritis of both knees     COPD (chronic obstructive pulmonary disease) (HCC)     Acquired genu varum of both lower extremities     Post-herpetic polyneuropathy     Episode of recurrent

## 2018-11-16 ENCOUNTER — APPOINTMENT (OUTPATIENT)
Dept: GENERAL RADIOLOGY | Facility: HOSPITAL | Age: 83
DRG: 191 | End: 2018-11-16
Attending: INTERNAL MEDICINE
Payer: MEDICARE

## 2018-11-16 PROCEDURE — 71045 X-RAY EXAM CHEST 1 VIEW: CPT | Performed by: INTERNAL MEDICINE

## 2018-11-16 PROCEDURE — 85025 COMPLETE CBC W/AUTO DIFF WBC: CPT | Performed by: FAMILY MEDICINE

## 2018-11-16 PROCEDURE — 94664 DEMO&/EVAL PT USE INHALER: CPT

## 2018-11-16 PROCEDURE — 94640 AIRWAY INHALATION TREATMENT: CPT

## 2018-11-16 PROCEDURE — 80053 COMPREHEN METABOLIC PANEL: CPT | Performed by: FAMILY MEDICINE

## 2018-11-16 RX ORDER — POLYETHYLENE GLYCOL 3350 17 G
4 POWDER IN PACKET (EA) ORAL AS NEEDED
Qty: 30 LOZENGE | Refills: 0 | Status: ON HOLD | OUTPATIENT
Start: 2018-11-16 | End: 2019-03-22

## 2018-11-16 RX ORDER — BENZONATATE 100 MG/1
100 CAPSULE ORAL 3 TIMES DAILY
Qty: 60 CAPSULE | Refills: 1 | Status: SHIPPED | OUTPATIENT
Start: 2018-11-16 | End: 2019-02-01 | Stop reason: CLARIF

## 2018-11-16 RX ORDER — PREDNISONE 10 MG/1
TABLET ORAL
Qty: 55 TABLET | Refills: 0 | Status: SHIPPED | OUTPATIENT
Start: 2018-11-16 | End: 2019-02-01 | Stop reason: CLARIF

## 2018-11-16 RX ORDER — SODIUM CHLORIDE 9 MG/ML
INJECTION, SOLUTION INTRAVENOUS CONTINUOUS
Status: DISCONTINUED | OUTPATIENT
Start: 2018-11-16 | End: 2018-11-16

## 2018-11-16 NOTE — PROGRESS NOTES
[facility]  Progress Note    Valdo Hill Patient Status:  Inpatient    1935 MRN [id number]   [facility] 5NW-A Attending Alicja Noble MD   [facility] Day # 6 PCP Krissy Rodriguez MD     Subjective:  Valdo Hill is a(n) 81year old Tobacco abuse, episodic     Pericolonic abscess due to diverticulitis     Diverticulosis     Fistula     Pain management     Pericardial effusion     Pericarditis     At risk for falling     Spondylosis of lumbar region without myelopathy or radiculopathy

## 2018-11-16 NOTE — PROGRESS NOTES
[facility]  Progress Note    Thewang Patel Patient Status:  Inpatient    1935 MRN [id number]   [facility] 5NW-A Attending Angel Garcia MD   Hosp Day # 5 PCP Louie Wooten MD       SUBJECTIVE:  No CP, SOB, or N/V.   Improving (DELTASONE) tab 40 mg 40 mg Oral Daily with breakfast   GuaiFENesin ER (MUCINEX) 12 hr tab 600 mg 600 mg Oral BID   benzonatate (TESSALON) cap 100 mg 100 mg Oral TID   benzonatate (TESSALON) cap 100 mg 100 mg Oral TID PRN   nicotine (NICODERM CQ) 14 MG/24H  ANXIETY  5  SPINAL STENOSIS  6  INTRACTABLE COUGH   7  HIATAL HERNIA     REPEAT CXR PA / LAT IMPROVED   DISCUSSED WITH PULM   CONT NEBS Q 4   TESSALON   ECHO PENDING   BNP NORMAL   PROGRESS SEEN TODAY  CONT ORAL STEROIDS         Yuli Diaz  11/15/201

## 2018-11-16 NOTE — PLAN OF CARE
Assume care of this patient at [phone]. VSS at this time. No c/o pain. Droplet precautions for rhino/enterovirus. Ativan given at 0005 as pt requesting at this time. Pt has become incontinent of bowel ( this is new onset).

## 2018-11-17 PROCEDURE — 80053 COMPREHEN METABOLIC PANEL: CPT | Performed by: FAMILY MEDICINE

## 2018-11-17 PROCEDURE — 94640 AIRWAY INHALATION TREATMENT: CPT

## 2018-11-17 PROCEDURE — 85025 COMPLETE CBC W/AUTO DIFF WBC: CPT | Performed by: FAMILY MEDICINE

## 2018-11-17 PROCEDURE — 84132 ASSAY OF SERUM POTASSIUM: CPT | Performed by: FAMILY MEDICINE

## 2018-11-17 RX ORDER — POTASSIUM CHLORIDE 20 MEQ/1
40 TABLET, EXTENDED RELEASE ORAL EVERY 4 HOURS
Status: COMPLETED | OUTPATIENT
Start: 2018-11-17 | End: 2018-11-17

## 2018-11-17 RX ORDER — BENZONATATE 100 MG/1
100 CAPSULE ORAL 3 TIMES DAILY PRN
Status: DISCONTINUED | OUTPATIENT
Start: 2018-11-17 | End: 2018-11-18

## 2018-11-17 NOTE — PROGRESS NOTES
[facility]  Progress Note    Josh Martinez Patient Status:  Inpatient    1935 MRN [id number]   [facility] 5NW-A Attending Don Ha MD   Hosp Day # 7 PCP Seun Hallman MD       SUBJECTIVE:  No CP, SOB, or N/V.   Improving (BENADRYL) cap/tab 25 mg 25 mg Oral Q6H PRN   ipratropium-albuterol (DUONEB) nebulizer solution 3 mL 3 mL Nebulization 6 times per day   ipratropium-albuterol (DUONEB) nebulizer solution 3 mL 3 mL Nebulization Q6H PRN   GuaiFENesin ER (MUCINEX) 12 hr tab 6 ([facility] [address]        Plan:   1  COPD EXACERBATION  2  EMPERIC COVERAGE  3  HTN  4  ANXIETY  5  SPINAL STENOSIS  6  INTRACTABLE COUGH   7  HIATAL HERNIA  8  TOBACCO      REPEAT CXR PA / LAT IMPROVED   DISCUSSED WITH PULM   CONT NEBS Q 4   TESSALON AND ROBITUSSIN

## 2018-11-17 NOTE — PROGRESS NOTES
[facility]  Progress Note    Temitope Zuniga Patient Status:  Inpatient    1935 MRN [id number]   [facility] 5NW-A Attending Kenneth Landers MD   [facility] Day # 7 PCP Angelina Mendez MD     Subjective:  Temitope Zuniga is a(n) 81year old Cultures:   No new/positive cultures    Radiology:  No new chest imaging      Medications reviewed     ASSESSMENT     · Acute exacerbation of COPD - suspected viral etiology with recent URI-slow to improve  · Hyponatremia-following  · HTN  · anxiety  ·

## 2018-11-17 NOTE — PLAN OF CARE
ANXIETY    • Will report anxiety at manageable levels Not Progressing          RESPIRATORY - ADULT    • Achieves optimal ventilation and oxygenation Progressing          PROBLEM:COPD    ASSESSMENT:PT IS ALERT TIMES 4, FORGETFUL AND ANXIOUS, ON RA SATING AT

## 2018-11-17 NOTE — PROGRESS NOTES
[facility]  Progress Note    Anderson Buerger Patient Status:  Inpatient    1935 MRN [id number]   [facility] 5NW-A Attending China Irving MD   Hosp Day # 7 PCP Eboni George MD       SUBJECTIVE:  No CP, SOB, or N/V.   IMPROVING times per day   ipratropium-albuterol (DUONEB) nebulizer solution 3 mL 3 mL Nebulization Q6H PRN   predniSONE (DELTASONE) tab 40 mg 40 mg Oral Daily with breakfast   GuaiFENesin ER (MUCINEX) 12 hr tab 600 mg 600 mg Oral BID   benzonatate (TESSALON) cap 100 without sciatica     COPD exacerbation ([facility] [address]        Plan:   1  COPD EXACERBATION  2  EMPERIC COVERAGE  3  HTN  4  ANXIETY  5  SPINAL STENOSIS  6  INTRACTABLE COUGH   7  HIATAL HERNIA  8  TOBACCO      REPEAT CXR PA / LAT IMPROVED   DISCUSSED WITH PULM   CON

## 2018-11-18 ENCOUNTER — APPOINTMENT (OUTPATIENT)
Dept: GENERAL RADIOLOGY | Facility: HOSPITAL | Age: 83
DRG: 191 | End: 2018-11-18
Attending: FAMILY MEDICINE
Payer: MEDICARE

## 2018-11-18 PROCEDURE — 94640 AIRWAY INHALATION TREATMENT: CPT

## 2018-11-18 PROCEDURE — 80053 COMPREHEN METABOLIC PANEL: CPT | Performed by: FAMILY MEDICINE

## 2018-11-18 PROCEDURE — 94667 MNPJ CHEST WALL 1ST: CPT

## 2018-11-18 PROCEDURE — 85025 COMPLETE CBC W/AUTO DIFF WBC: CPT | Performed by: FAMILY MEDICINE

## 2018-11-18 PROCEDURE — 71045 X-RAY EXAM CHEST 1 VIEW: CPT | Performed by: FAMILY MEDICINE

## 2018-11-18 RX ORDER — BENZONATATE 100 MG/1
100 CAPSULE ORAL 3 TIMES DAILY
Status: DISCONTINUED | OUTPATIENT
Start: 2018-11-18 | End: 2018-11-19

## 2018-11-18 NOTE — PROGRESS NOTES
[facility]  Progress Note    Chan Rivera Patient Status:  Inpatient    1935 MRN [id number]   [facility] 5NW-A Attending Garry Cain MD   [facility] Day # 8 PCP Prudence Olson MD     Subjective:  Chan Rivera is a(n) 81year old 3. 4*  5.3*  4.2   CL  92*  92*   --   97*   CO2  29.0  29.0   --   24.0     Lab Results   Component Value Date    INR 0.96 01/09/2017    INR 0.94 01/06/2017    INR 1.00 03/20/2016     Cultures:   No new/positive cultures    Radiology:  No new chest imaging

## 2018-11-18 NOTE — PLAN OF CARE
ANXIETY    • Will report anxiety at manageable levels Progressing        RESPIRATORY - ADULT    • Achieves optimal ventilation and oxygenation Progressing          PROBLEM:COPD    ASSESSMENT:PT IS ALERT TIMES 4, FORGETFUL, ANXIOUS, ON RA, STRONG NONPRODUCT

## 2018-11-19 ENCOUNTER — APPOINTMENT (OUTPATIENT)
Dept: GENERAL RADIOLOGY | Facility: HOSPITAL | Age: 83
DRG: 191 | End: 2018-11-19
Attending: FAMILY MEDICINE
Payer: MEDICARE

## 2018-11-19 VITALS
DIASTOLIC BLOOD PRESSURE: 52 MMHG | RESPIRATION RATE: 18 BRPM | HEIGHT: 65 IN | HEART RATE: 90 BPM | TEMPERATURE: 98 F | BODY MASS INDEX: 26.66 KG/M2 | WEIGHT: 160 LBS | SYSTOLIC BLOOD PRESSURE: 94 MMHG | OXYGEN SATURATION: 93 %

## 2018-11-19 PROBLEM — E87.1 HYPONATREMIA: Status: ACTIVE | Noted: 2018-11-19

## 2018-11-19 PROBLEM — E87.6 HYPOKALEMIA: Status: ACTIVE | Noted: 2018-11-19

## 2018-11-19 PROCEDURE — 71045 X-RAY EXAM CHEST 1 VIEW: CPT | Performed by: FAMILY MEDICINE

## 2018-11-19 PROCEDURE — 94668 MNPJ CHEST WALL SBSQ: CPT

## 2018-11-19 PROCEDURE — 94664 DEMO&/EVAL PT USE INHALER: CPT

## 2018-11-19 PROCEDURE — 80053 COMPREHEN METABOLIC PANEL: CPT | Performed by: FAMILY MEDICINE

## 2018-11-19 PROCEDURE — 94640 AIRWAY INHALATION TREATMENT: CPT

## 2018-11-19 PROCEDURE — 85025 COMPLETE CBC W/AUTO DIFF WBC: CPT | Performed by: FAMILY MEDICINE

## 2018-11-19 RX ORDER — NICOTINE 21 MG/24HR
1 PATCH, TRANSDERMAL 24 HOURS TRANSDERMAL DAILY
Qty: 30 PATCH | Refills: 0 | Status: ON HOLD | OUTPATIENT
Start: 2018-11-20 | End: 2019-02-05

## 2018-11-19 RX ORDER — GABAPENTIN 300 MG/1
300 CAPSULE ORAL NIGHTLY
Qty: 30 CAPSULE | Refills: 2 | Status: SHIPPED | OUTPATIENT
Start: 2018-11-19 | End: 2019-02-01

## 2018-11-19 RX ORDER — PREDNISONE 10 MG/1
30 TABLET ORAL
Qty: 13 TABLET | Refills: 0 | Status: SHIPPED | OUTPATIENT
Start: 2018-11-20 | End: 2018-11-19

## 2018-11-19 RX ORDER — IPRATROPIUM BROMIDE AND ALBUTEROL SULFATE 2.5; .5 MG/3ML; MG/3ML
3 SOLUTION RESPIRATORY (INHALATION)
Qty: 100 CONTAINER | Refills: 2 | Status: SHIPPED | OUTPATIENT
Start: 2018-11-19 | End: 2019-02-01 | Stop reason: CLARIF

## 2018-11-19 RX ORDER — GUAIFENESIN 600 MG
600 TABLET, EXTENDED RELEASE 12 HR ORAL 2 TIMES DAILY
Qty: 30 TABLET | Refills: 0 | Status: ON HOLD | OUTPATIENT
Start: 2018-11-19 | End: 2020-01-01

## 2018-11-19 NOTE — PLAN OF CARE
ANXIETY    • Will report anxiety at manageable levels Progressing        Patient/Family Goals    • Patient/Family Long Term Goal Progressing    • Patient/Family Short Term Goal Progressing          Patient A&Ox4,forgetful at times and anxious.  Vital signs

## 2018-11-19 NOTE — PROGRESS NOTES
NURSING DISCHARGE NOTE    Discharged Home via Wheelchair. Accompanied by Spouse  Belongings Taken by patient/family. Patient stable upon discharge. IV removed. Discharge instructions and information given. No further questions at this time.

## 2018-11-19 NOTE — PROGRESS NOTES
[facility]  Progress Note    Andrew Soriano Patient Status:  Inpatient    1935 MRN [id number]   [facility] 5NW-A Attending Jr Duke MD   Hosp Day # 9 PCP Jasmina Quinn MD       SUBJECTIVE:  No CP, SOB, or N/V.  Lucie Hernandez (ROBITUSSIN-DM)  MG/5ML liquid 5 mL 5 mL Oral Q6H PRN   diphenhydrAMINE (BENADRYL) cap/tab 25 mg 25 mg Oral Q6H PRN   ipratropium-albuterol (DUONEB) nebulizer solution 3 mL 3 mL Nebulization 6 times per day   ipratropium-albuterol (DUONEB) nebulizer right-sided low back pain without sciatica     COPD exacerbation ([address]        Plan:     1  COPD EXACERBATION  2  EMPERIC COVERAGE  3  HTN  4  ANXIETY  5  SPINAL STENOSIS  6  INTRACTABLE COUGH   7  HIATAL HERNIA  8  TOBACCO      REPEAT CXR [place] / Pastor Goodwin

## 2018-11-19 NOTE — PROGRESS NOTES
[facility]  Progress Note    Zohra Baez Patient Status:  Inpatient    1935 MRN [id number]   [facility] 5NW-A Attending Tracie Pollock MD   Hosp Day # 9 PCP Irving Clarke MD       SUBJECTIVE:  No CP, SOB, or N/V.   No coughin (ROBITUSSIN-DM)  MG/5ML liquid 5 mL 5 mL Oral Q6H PRN   diphenhydrAMINE (BENADRYL) cap/tab 25 mg 25 mg Oral Q6H PRN   ipratropium-albuterol (DUONEB) nebulizer solution 3 mL 3 mL Nebulization 6 times per day   ipratropium-albuterol (DUONEB) nebulizer right-sided low back pain without sciatica     COPD exacerbation ([address]     Hyponatremia     Hypokalemia        Plan:      1  COPD EXACERBATION  2  EMPERIC COVERAGE  3  HTN  4  ANXIETY  5  SPINAL STENOSIS  6  INTRACTABLE COUGH   7  HIATAL HERNIA  8  TOBACCO

## 2018-11-19 NOTE — PROGRESS NOTES
[facility]  Progress Note    Bonifacio Bird Patient Status:  Inpatient    1935 MRN [id number]   [facility] 5NW-A Attending Jazmin Alexander MD   [facility] Day # 9 PCP Niesha Shepherd MD      ASSESSMENT     · Acute exacerbation of  °C), temperature source Oral, resp. rate 18, height 5' 5\" (1.651 m), weight 160 lb (72.6 kg), SpO2 93 %, not currently breastfeeding.     Intake/Output:    Intake/Output Summary (Last 24 hours) at 11/19/2018 0909  Last data filed at 11/19/2018 0600  Gross the last 72 hours. No results for input(s): ABGPHT, AJOAHN6S, OCMZK4G, ABGHCO3, ABGBE, TEMP, REN, SITE, DEV, THGB in the last 168 hours.     Invalid input(s): [id number], CHOB    Cultures:    Radiology:      Medications Reviewed:    Current [facility]

## 2018-11-20 NOTE — CM/SW NOTE
11/20/18 0900   Discharge disposition   Expected discharge disposition Home or Self   Home services after discharge Patient refused services   Discharge transportation Private car     Patient discharged on 11/19/2018 as previously planned.

## 2018-12-30 NOTE — DISCHARGE SUMMARY
[facility]  Discharge Summary    Andrew Soriano Patient Status:  Inpatient    1935 MRN [id number]   [facility] 5NW-A Attending No att. providers found   Hosp Day # 9 PCP Jasmina Quinn MD     Date of Admission: 11/10/2018 10:01 R-2    nicotine 14 MG/24HR Transdermal Patch 24 Hr  Place 1 patch onto the skin daily. , Normal, Disp-30 patch, R-0    Umeclidinium Bromide 62.5 MCG/INH Inhalation Aerosol Powder, Breath Activated  Inhale 1 puff into the lungs daily. , Normal, Disp-1 each, R Cap  Take 1 capsule by mouth daily. , Historical    Vitamin C 500 MG Oral Tab  Take 500 mg by mouth daily. , Historical    Cyanocobalamin (VITAMIN B 12 OR)  Take 500 mg by mouth daily. , Historical    Triamterene-HCTZ 37.5-25 MG Oral Tab  Take 1 tablet by margy

## 2019-01-01 ENCOUNTER — HOSPITAL ENCOUNTER (INPATIENT)
Facility: HOSPITAL | Age: 84
LOS: 5 days | Discharge: HOME | DRG: 191 | End: 2019-01-01
Attending: EMERGENCY MEDICINE | Admitting: FAMILY MEDICINE
Payer: MEDICARE

## 2019-01-01 ENCOUNTER — TELEPHONE (OUTPATIENT)
Dept: OBGYN CLINIC | Facility: CLINIC | Age: 84
End: 2019-01-01

## 2019-01-01 ENCOUNTER — HOSPITAL ENCOUNTER (OUTPATIENT)
Dept: GENERAL RADIOLOGY | Facility: HOSPITAL | Age: 84
Discharge: HOME | End: 2019-01-01
Attending: PHYSICIAN ASSISTANT
Payer: MEDICARE

## 2019-01-01 ENCOUNTER — APPOINTMENT (OUTPATIENT)
Dept: ULTRASOUND IMAGING | Facility: HOSPITAL | Age: 84
DRG: 191 | End: 2019-01-01
Attending: INTERNAL MEDICINE
Payer: MEDICARE

## 2019-01-01 ENCOUNTER — HOSPITAL ENCOUNTER (OUTPATIENT)
Dept: GENERAL RADIOLOGY | Facility: HOSPITAL | Age: 84
Discharge: HOME | End: 2019-01-01
Attending: INTERNAL MEDICINE
Payer: MEDICARE

## 2019-01-01 ENCOUNTER — ORDER TRANSCRIPTION (OUTPATIENT)
Dept: SLEEP CENTER | Facility: HOSPITAL | Age: 84
End: 2019-01-01

## 2019-01-01 ENCOUNTER — APPOINTMENT (OUTPATIENT)
Dept: GENERAL RADIOLOGY | Facility: HOSPITAL | Age: 84
DRG: 191 | End: 2019-01-01
Attending: INTERNAL MEDICINE
Payer: MEDICARE

## 2019-01-01 ENCOUNTER — APPOINTMENT (OUTPATIENT)
Dept: GENERAL RADIOLOGY | Facility: HOSPITAL | Age: 84
DRG: 191 | End: 2019-01-01
Attending: FAMILY MEDICINE
Payer: MEDICARE

## 2019-01-01 ENCOUNTER — TELEPHONE (OUTPATIENT)
Dept: RHEUMATOLOGY | Facility: CLINIC | Age: 84
End: 2019-01-01

## 2019-01-01 ENCOUNTER — HOSPITAL ENCOUNTER (INPATIENT)
Facility: HOSPITAL | Age: 84
LOS: 8 days | Discharge: HOME | DRG: 191 | End: 2019-01-01
Attending: EMERGENCY MEDICINE | Admitting: FAMILY MEDICINE
Payer: MEDICARE

## 2019-01-01 ENCOUNTER — APPOINTMENT (OUTPATIENT)
Dept: CT IMAGING | Facility: HOSPITAL | Age: 84
DRG: 191 | End: 2019-01-01
Attending: FAMILY MEDICINE
Payer: MEDICARE

## 2019-01-01 ENCOUNTER — APPOINTMENT (OUTPATIENT)
Dept: GENERAL RADIOLOGY | Facility: HOSPITAL | Age: 84
DRG: 191 | End: 2019-01-01
Attending: EMERGENCY MEDICINE
Payer: MEDICARE

## 2019-01-01 ENCOUNTER — OFFICE VISIT (OUTPATIENT)
Dept: OBGYN CLINIC | Facility: CLINIC | Age: 84
End: 2019-01-01
Payer: MEDICARE

## 2019-01-01 ENCOUNTER — OFFICE VISIT (OUTPATIENT)
Dept: RHEUMATOLOGY | Facility: CLINIC | Age: 84
End: 2019-01-01
Payer: MEDICARE

## 2019-01-01 ENCOUNTER — HOSPITAL ENCOUNTER (OUTPATIENT)
Dept: GENERAL RADIOLOGY | Facility: HOSPITAL | Age: 84
Discharge: HOME | End: 2019-01-01
Attending: NURSE PRACTITIONER
Payer: MEDICARE

## 2019-01-01 ENCOUNTER — TELEPHONE (OUTPATIENT)
Dept: SURGERY | Facility: CLINIC | Age: 84
End: 2019-01-01

## 2019-01-01 ENCOUNTER — OFFICE VISIT (OUTPATIENT)
Dept: SURGERY | Facility: CLINIC | Age: 84
End: 2019-01-01
Payer: MEDICARE

## 2019-01-01 ENCOUNTER — APPOINTMENT (OUTPATIENT)
Dept: GENERAL RADIOLOGY | Facility: HOSPITAL | Age: 84
DRG: 191 | End: 2019-01-01
Payer: MEDICARE

## 2019-01-01 ENCOUNTER — APPOINTMENT (OUTPATIENT)
Dept: ULTRASOUND IMAGING | Facility: HOSPITAL | Age: 84
DRG: 191 | End: 2019-01-01
Attending: EMERGENCY MEDICINE
Payer: MEDICARE

## 2019-01-01 ENCOUNTER — HOSPITAL ENCOUNTER (OUTPATIENT)
Dept: GENERAL RADIOLOGY | Age: 84
Discharge: HOME | End: 2019-01-01
Attending: INTERNAL MEDICINE
Payer: MEDICARE

## 2019-01-01 VITALS
SYSTOLIC BLOOD PRESSURE: 124 MMHG | HEART RATE: 76 BPM | HEIGHT: 65 IN | DIASTOLIC BLOOD PRESSURE: 78 MMHG | BODY MASS INDEX: 28.32 KG/M2 | WEIGHT: 170 LBS

## 2019-01-01 VITALS
HEIGHT: 65 IN | WEIGHT: 175 LBS | SYSTOLIC BLOOD PRESSURE: 118 MMHG | BODY MASS INDEX: 29.16 KG/M2 | DIASTOLIC BLOOD PRESSURE: 60 MMHG | HEART RATE: 62 BPM

## 2019-01-01 VITALS
DIASTOLIC BLOOD PRESSURE: 66 MMHG | SYSTOLIC BLOOD PRESSURE: 126 MMHG | HEART RATE: 76 BPM | HEIGHT: 65 IN | WEIGHT: 173 LBS | BODY MASS INDEX: 28.82 KG/M2

## 2019-01-01 VITALS
DIASTOLIC BLOOD PRESSURE: 60 MMHG | HEART RATE: 80 BPM | WEIGHT: 176 LBS | BODY MASS INDEX: 29 KG/M2 | SYSTOLIC BLOOD PRESSURE: 102 MMHG

## 2019-01-01 VITALS
WEIGHT: 170.5 LBS | DIASTOLIC BLOOD PRESSURE: 82 MMHG | RESPIRATION RATE: 18 BRPM | BODY MASS INDEX: 28.41 KG/M2 | OXYGEN SATURATION: 95 % | HEART RATE: 61 BPM | HEIGHT: 65 IN | SYSTOLIC BLOOD PRESSURE: 108 MMHG | TEMPERATURE: 99 F

## 2019-01-01 VITALS
WEIGHT: 173.19 LBS | SYSTOLIC BLOOD PRESSURE: 135 MMHG | HEART RATE: 88 BPM | DIASTOLIC BLOOD PRESSURE: 79 MMHG | TEMPERATURE: 98 F | BODY MASS INDEX: 28.86 KG/M2 | RESPIRATION RATE: 18 BRPM | HEIGHT: 65 IN | OXYGEN SATURATION: 95 %

## 2019-01-01 VITALS
SYSTOLIC BLOOD PRESSURE: 136 MMHG | TEMPERATURE: 98 F | DIASTOLIC BLOOD PRESSURE: 74 MMHG | RESPIRATION RATE: 17 BRPM | OXYGEN SATURATION: 92 % | HEART RATE: 90 BPM | WEIGHT: 177.38 LBS | HEIGHT: 65 IN | BODY MASS INDEX: 29.55 KG/M2

## 2019-01-01 DIAGNOSIS — R60.0 BILATERAL LEG EDEMA: ICD-10-CM

## 2019-01-01 DIAGNOSIS — R06.81 APNEA: Primary | ICD-10-CM

## 2019-01-01 DIAGNOSIS — J44.1 COPD EXACERBATION (HCC): Primary | ICD-10-CM

## 2019-01-01 DIAGNOSIS — G47.33 OBSTRUCTIVE SLEEP APNEA: ICD-10-CM

## 2019-01-01 DIAGNOSIS — Z12.31 ENCOUNTER FOR SCREENING MAMMOGRAM FOR MALIGNANT NEOPLASM OF BREAST: Primary | ICD-10-CM

## 2019-01-01 DIAGNOSIS — S12.000A CLOSED DISPLACED FRACTURE OF FIRST CERVICAL VERTEBRA, UNSPECIFIED FRACTURE MORPHOLOGY, INITIAL ENCOUNTER (HCC): ICD-10-CM

## 2019-01-01 DIAGNOSIS — J44.9 COPD (CHRONIC OBSTRUCTIVE PULMONARY DISEASE) (HCC): ICD-10-CM

## 2019-01-01 DIAGNOSIS — M17.0 PRIMARY OSTEOARTHRITIS OF BOTH KNEES: ICD-10-CM

## 2019-01-01 DIAGNOSIS — N76.0 VAGINITIS AND VULVOVAGINITIS: ICD-10-CM

## 2019-01-01 DIAGNOSIS — M47.816 SPONDYLOSIS OF LUMBAR REGION WITHOUT MYELOPATHY OR RADICULOPATHY: Primary | ICD-10-CM

## 2019-01-01 DIAGNOSIS — M51.36 DEGENERATIVE DISC DISEASE, LUMBAR: ICD-10-CM

## 2019-01-01 DIAGNOSIS — S12.000D CLOSED DISPLACED FRACTURE OF FIRST CERVICAL VERTEBRA WITH ROUTINE HEALING, UNSPECIFIED FRACTURE MORPHOLOGY, SUBSEQUENT ENCOUNTER: ICD-10-CM

## 2019-01-01 DIAGNOSIS — Z12.31 ENCOUNTER FOR SCREENING MAMMOGRAM FOR BREAST CANCER: ICD-10-CM

## 2019-01-01 DIAGNOSIS — R06.00 DYSPNEA: ICD-10-CM

## 2019-01-01 DIAGNOSIS — S12.000D CLOSED DISPLACED FRACTURE OF FIRST CERVICAL VERTEBRA WITH ROUTINE HEALING, UNSPECIFIED FRACTURE MORPHOLOGY, SUBSEQUENT ENCOUNTER: Primary | ICD-10-CM

## 2019-01-01 DIAGNOSIS — Z01.419 WELL WOMAN EXAM WITH ROUTINE GYNECOLOGICAL EXAM: Primary | ICD-10-CM

## 2019-01-01 DIAGNOSIS — F41.9 ANXIETY: ICD-10-CM

## 2019-01-01 DIAGNOSIS — J47.1 BRONCHIECTASIS WITH (ACUTE) EXACERBATION (HCC): ICD-10-CM

## 2019-01-01 LAB
ADENOVIRUS PCR:: NEGATIVE
ALBUMIN SERPL-MCNC: 3.1 G/DL (ref 3.4–5)
ALBUMIN SERPL-MCNC: 3.1 G/DL (ref 3.4–5)
ALBUMIN SERPL-MCNC: 3.2 G/DL (ref 3.4–5)
ALBUMIN SERPL-MCNC: 3.3 G/DL (ref 3.4–5)
ALBUMIN SERPL-MCNC: 3.4 G/DL (ref 3.4–5)
ALBUMIN SERPL-MCNC: 3.7 G/DL (ref 3.4–5)
ALBUMIN/GLOB SERPL: 1 {RATIO} (ref 1–2)
ALBUMIN/GLOB SERPL: 1 {RATIO} (ref 1–2)
ALBUMIN/GLOB SERPL: 1.1 {RATIO} (ref 1–2)
ALP LIVER SERPL-CCNC: 61 U/L (ref 55–142)
ALP LIVER SERPL-CCNC: 65 U/L (ref 55–142)
ALP LIVER SERPL-CCNC: 68 U/L (ref 55–142)
ALP LIVER SERPL-CCNC: 69 U/L (ref 55–142)
ALP LIVER SERPL-CCNC: 69 U/L (ref 55–142)
ALP LIVER SERPL-CCNC: 84 U/L (ref 55–142)
ALT SERPL-CCNC: 12 U/L (ref 13–56)
ALT SERPL-CCNC: 13 U/L (ref 13–56)
ALT SERPL-CCNC: 15 U/L (ref 13–56)
ALT SERPL-CCNC: 15 U/L (ref 13–56)
ALT SERPL-CCNC: 16 U/L (ref 13–56)
ALT SERPL-CCNC: 17 U/L (ref 13–56)
ANION GAP SERPL CALC-SCNC: 3 MMOL/L (ref 0–18)
ANION GAP SERPL CALC-SCNC: 4 MMOL/L (ref 0–18)
ANION GAP SERPL CALC-SCNC: 5 MMOL/L (ref 0–18)
ANION GAP SERPL CALC-SCNC: 6 MMOL/L (ref 0–18)
ANION GAP SERPL CALC-SCNC: 7 MMOL/L (ref 0–18)
ANION GAP SERPL CALC-SCNC: 8 MMOL/L (ref 0–18)
AST SERPL-CCNC: 12 U/L (ref 15–37)
AST SERPL-CCNC: 14 U/L (ref 15–37)
AST SERPL-CCNC: 14 U/L (ref 15–37)
AST SERPL-CCNC: 20 U/L (ref 15–37)
AST SERPL-CCNC: 21 U/L (ref 15–37)
AST SERPL-CCNC: 9 U/L (ref 15–37)
ATRIAL RATE: 76 BPM
B PERT DNA SPEC QL NAA+PROBE: NEGATIVE
BASOPHILS # BLD AUTO: 0 X10(3) UL (ref 0–0.2)
BASOPHILS # BLD AUTO: 0.01 X10(3) UL (ref 0–0.2)
BASOPHILS # BLD AUTO: 0.06 X10(3) UL (ref 0–0.2)
BASOPHILS NFR BLD AUTO: 0 %
BASOPHILS NFR BLD AUTO: 0.1 %
BASOPHILS NFR BLD AUTO: 0.2 %
BASOPHILS NFR BLD AUTO: 0.7 %
BILIRUB SERPL-MCNC: 0.2 MG/DL (ref 0.1–2)
BILIRUB SERPL-MCNC: 0.2 MG/DL (ref 0.1–2)
BILIRUB SERPL-MCNC: 0.3 MG/DL (ref 0.1–2)
BILIRUB SERPL-MCNC: 0.4 MG/DL (ref 0.1–2)
BILIRUB SERPL-MCNC: 0.4 MG/DL (ref 0.1–2)
BILIRUB SERPL-MCNC: 0.5 MG/DL (ref 0.1–2)
BILIRUB UR QL STRIP.AUTO: NEGATIVE
BUN BLD-MCNC: 10 MG/DL (ref 7–18)
BUN BLD-MCNC: 11 MG/DL (ref 7–18)
BUN BLD-MCNC: 12 MG/DL (ref 7–18)
BUN BLD-MCNC: 14 MG/DL (ref 7–18)
BUN/CREAT SERPL: 12.7 (ref 10–20)
BUN/CREAT SERPL: 13.2 (ref 10–20)
BUN/CREAT SERPL: 15.2 (ref 10–20)
BUN/CREAT SERPL: 15.5 (ref 10–20)
BUN/CREAT SERPL: 17.1 (ref 10–20)
BUN/CREAT SERPL: 18.8 (ref 10–20)
C PNEUM DNA SPEC QL NAA+PROBE: NEGATIVE
CALCIUM BLD-MCNC: 8.3 MG/DL (ref 8.5–10.1)
CALCIUM BLD-MCNC: 8.6 MG/DL (ref 8.5–10.1)
CALCIUM BLD-MCNC: 9 MG/DL (ref 8.5–10.1)
CALCIUM BLD-MCNC: 9.1 MG/DL (ref 8.5–10.1)
CHLORIDE SERPL-SCNC: 101 MMOL/L (ref 98–112)
CHLORIDE SERPL-SCNC: 102 MMOL/L (ref 98–112)
CHLORIDE SERPL-SCNC: 104 MMOL/L (ref 98–112)
CHLORIDE SERPL-SCNC: 105 MMOL/L (ref 98–112)
CHLORIDE SERPL-SCNC: 106 MMOL/L (ref 98–112)
CHLORIDE SERPL-SCNC: 107 MMOL/L (ref 98–112)
CLARITY UR REFRACT.AUTO: CLEAR
CO2 SERPL-SCNC: 27 MMOL/L (ref 21–32)
CO2 SERPL-SCNC: 29 MMOL/L (ref 21–32)
CO2 SERPL-SCNC: 29 MMOL/L (ref 21–32)
CO2 SERPL-SCNC: 30 MMOL/L (ref 21–32)
CO2 SERPL-SCNC: 31 MMOL/L (ref 21–32)
CO2 SERPL-SCNC: 32 MMOL/L (ref 21–32)
COLOR UR AUTO: YELLOW
CORONAVIRUS 229E PCR:: NEGATIVE
CORONAVIRUS HKU1 PCR:: NEGATIVE
CORONAVIRUS NL63 PCR:: NEGATIVE
CORONAVIRUS OC43 PCR:: NEGATIVE
CREAT BLD-MCNC: 0.64 MG/DL (ref 0.55–1.02)
CREAT BLD-MCNC: 0.66 MG/DL (ref 0.55–1.02)
CREAT BLD-MCNC: 0.71 MG/DL (ref 0.55–1.02)
CREAT BLD-MCNC: 0.76 MG/DL (ref 0.55–1.02)
CREAT BLD-MCNC: 0.79 MG/DL (ref 0.55–1.02)
CREAT BLD-MCNC: 0.82 MG/DL (ref 0.55–1.02)
D-DIMER: 0.86 UG/ML FEU (ref ?–0.83)
DEPRECATED RDW RBC AUTO: 39.6 FL (ref 35.1–46.3)
DEPRECATED RDW RBC AUTO: 39.7 FL (ref 35.1–46.3)
DEPRECATED RDW RBC AUTO: 40.1 FL (ref 35.1–46.3)
DEPRECATED RDW RBC AUTO: 40.5 FL (ref 35.1–46.3)
DEPRECATED RDW RBC AUTO: 41.9 FL (ref 35.1–46.3)
DEPRECATED RDW RBC AUTO: 41.9 FL (ref 35.1–46.3)
EOSINOPHIL # BLD AUTO: 0 X10(3) UL (ref 0–0.7)
EOSINOPHIL # BLD AUTO: 0 X10(3) UL (ref 0–0.7)
EOSINOPHIL # BLD AUTO: 0.05 X10(3) UL (ref 0–0.7)
EOSINOPHIL # BLD AUTO: 0.06 X10(3) UL (ref 0–0.7)
EOSINOPHIL # BLD AUTO: 0.19 X10(3) UL (ref 0–0.7)
EOSINOPHIL # BLD AUTO: 0.4 X10(3) UL (ref 0–0.7)
EOSINOPHIL NFR BLD AUTO: 0 %
EOSINOPHIL NFR BLD AUTO: 0 %
EOSINOPHIL NFR BLD AUTO: 0.6 %
EOSINOPHIL NFR BLD AUTO: 0.9 %
EOSINOPHIL NFR BLD AUTO: 2.4 %
EOSINOPHIL NFR BLD AUTO: 4.8 %
ERYTHROCYTE [DISTWIDTH] IN BLOOD BY AUTOMATED COUNT: 12.3 % (ref 11–15)
ERYTHROCYTE [DISTWIDTH] IN BLOOD BY AUTOMATED COUNT: 12.4 % (ref 11–15)
ERYTHROCYTE [DISTWIDTH] IN BLOOD BY AUTOMATED COUNT: 12.6 % (ref 11–15)
ERYTHROCYTE [DISTWIDTH] IN BLOOD BY AUTOMATED COUNT: 12.7 % (ref 11–15)
ERYTHROCYTE [DISTWIDTH] IN BLOOD BY AUTOMATED COUNT: 12.8 % (ref 11–15)
ERYTHROCYTE [DISTWIDTH] IN BLOOD BY AUTOMATED COUNT: 12.8 % (ref 11–15)
FLUAV RNA SPEC QL NAA+PROBE: NEGATIVE
FLUBV RNA SPEC QL NAA+PROBE: NEGATIVE
GLOBULIN PLAS-MCNC: 2.7 G/DL (ref 2.8–4.4)
GLOBULIN PLAS-MCNC: 2.8 G/DL (ref 2.8–4.4)
GLOBULIN PLAS-MCNC: 3 G/DL (ref 2.8–4.4)
GLOBULIN PLAS-MCNC: 3.2 G/DL (ref 2.8–4.4)
GLOBULIN PLAS-MCNC: 3.3 G/DL (ref 2.8–4.4)
GLOBULIN PLAS-MCNC: 3.5 G/DL (ref 2.8–4.4)
GLUCOSE BLD-MCNC: 100 MG/DL (ref 70–99)
GLUCOSE BLD-MCNC: 105 MG/DL (ref 70–99)
GLUCOSE BLD-MCNC: 163 MG/DL (ref 70–99)
GLUCOSE BLD-MCNC: 86 MG/DL (ref 70–99)
GLUCOSE BLD-MCNC: 87 MG/DL (ref 70–99)
GLUCOSE BLD-MCNC: 93 MG/DL (ref 70–99)
GLUCOSE UR STRIP.AUTO-MCNC: NEGATIVE MG/DL
HCT VFR BLD AUTO: 38.3 % (ref 35–48)
HCT VFR BLD AUTO: 38.4 % (ref 35–48)
HCT VFR BLD AUTO: 39.5 % (ref 35–48)
HCT VFR BLD AUTO: 40.5 % (ref 35–48)
HCT VFR BLD AUTO: 40.6 % (ref 35–48)
HCT VFR BLD AUTO: 42.9 % (ref 35–48)
HGB BLD-MCNC: 12.2 G/DL (ref 12–16)
HGB BLD-MCNC: 12.7 G/DL (ref 12–16)
HGB BLD-MCNC: 12.7 G/DL (ref 12–16)
HGB BLD-MCNC: 12.9 G/DL (ref 12–16)
HGB BLD-MCNC: 13.2 G/DL (ref 12–16)
HGB BLD-MCNC: 14.2 G/DL (ref 12–16)
IMM GRANULOCYTES # BLD AUTO: 0.01 X10(3) UL (ref 0–1)
IMM GRANULOCYTES # BLD AUTO: 0.02 X10(3) UL (ref 0–1)
IMM GRANULOCYTES # BLD AUTO: 0.03 X10(3) UL (ref 0–1)
IMM GRANULOCYTES # BLD AUTO: 0.06 X10(3) UL (ref 0–1)
IMM GRANULOCYTES NFR BLD: 0.2 %
IMM GRANULOCYTES NFR BLD: 0.3 %
IMM GRANULOCYTES NFR BLD: 0.4 %
IMM GRANULOCYTES NFR BLD: 0.5 %
KETONES UR STRIP.AUTO-MCNC: NEGATIVE MG/DL
LEUKOCYTE ESTERASE UR QL STRIP.AUTO: NEGATIVE
LYMPHOCYTES # BLD AUTO: 0.42 X10(3) UL (ref 1–4)
LYMPHOCYTES # BLD AUTO: 1.15 X10(3) UL (ref 1–4)
LYMPHOCYTES # BLD AUTO: 2.06 X10(3) UL (ref 1–4)
LYMPHOCYTES # BLD AUTO: 2.39 X10(3) UL (ref 1–4)
LYMPHOCYTES # BLD AUTO: 2.6 X10(3) UL (ref 1–4)
LYMPHOCYTES # BLD AUTO: 2.89 X10(3) UL (ref 1–4)
LYMPHOCYTES NFR BLD AUTO: 10.1 %
LYMPHOCYTES NFR BLD AUTO: 11.5 %
LYMPHOCYTES NFR BLD AUTO: 31 %
LYMPHOCYTES NFR BLD AUTO: 31.1 %
LYMPHOCYTES NFR BLD AUTO: 32.1 %
LYMPHOCYTES NFR BLD AUTO: 37 %
M PROTEIN MFR SERPL ELPH: 5.8 G/DL (ref 6.4–8.2)
M PROTEIN MFR SERPL ELPH: 5.9 G/DL (ref 6.4–8.2)
M PROTEIN MFR SERPL ELPH: 6.3 G/DL (ref 6.4–8.2)
M PROTEIN MFR SERPL ELPH: 6.4 G/DL (ref 6.4–8.2)
M PROTEIN MFR SERPL ELPH: 6.7 G/DL (ref 6.4–8.2)
M PROTEIN MFR SERPL ELPH: 7.2 G/DL (ref 6.4–8.2)
MCH RBC QN AUTO: 28.2 PG (ref 26–34)
MCH RBC QN AUTO: 28.3 PG (ref 26–34)
MCH RBC QN AUTO: 28.4 PG (ref 26–34)
MCH RBC QN AUTO: 28.6 PG (ref 26–34)
MCH RBC QN AUTO: 28.6 PG (ref 26–34)
MCH RBC QN AUTO: 28.9 PG (ref 26–34)
MCHC RBC AUTO-ENTMCNC: 31.9 G/DL (ref 31–37)
MCHC RBC AUTO-ENTMCNC: 31.9 G/DL (ref 31–37)
MCHC RBC AUTO-ENTMCNC: 32.2 G/DL (ref 31–37)
MCHC RBC AUTO-ENTMCNC: 32.5 G/DL (ref 31–37)
MCHC RBC AUTO-ENTMCNC: 33.1 G/DL (ref 31–37)
MCHC RBC AUTO-ENTMCNC: 33.1 G/DL (ref 31–37)
MCV RBC AUTO: 86.5 FL (ref 80–100)
MCV RBC AUTO: 87.4 FL (ref 80–100)
MCV RBC AUTO: 87.8 FL (ref 80–100)
MCV RBC AUTO: 88.1 FL (ref 80–100)
MCV RBC AUTO: 88.8 FL (ref 80–100)
MCV RBC AUTO: 89.3 FL (ref 80–100)
METAPNEUMOVIRUS PCR:: NEGATIVE
MONOCYTES # BLD AUTO: 0.07 X10(3) UL (ref 0.1–1)
MONOCYTES # BLD AUTO: 0.56 X10(3) UL (ref 0.1–1)
MONOCYTES # BLD AUTO: 0.77 X10(3) UL (ref 0.1–1)
MONOCYTES # BLD AUTO: 0.79 X10(3) UL (ref 0.1–1)
MONOCYTES # BLD AUTO: 0.87 X10(3) UL (ref 0.1–1)
MONOCYTES # BLD AUTO: 1 X10(3) UL (ref 0.1–1)
MONOCYTES NFR BLD AUTO: 1.9 %
MONOCYTES NFR BLD AUTO: 10.2 %
MONOCYTES NFR BLD AUTO: 10.4 %
MONOCYTES NFR BLD AUTO: 8.7 %
MONOCYTES NFR BLD AUTO: 8.8 %
MONOCYTES NFR BLD AUTO: 9.8 %
MYCOPLASMA PNEUMONIA PCR:: NEGATIVE
NEUTROPHILS # BLD AUTO: 3.14 X10 (3) UL (ref 1.5–7.7)
NEUTROPHILS # BLD AUTO: 3.14 X10(3) UL (ref 1.5–7.7)
NEUTROPHILS # BLD AUTO: 3.71 X10 (3) UL (ref 1.5–7.7)
NEUTROPHILS # BLD AUTO: 3.71 X10(3) UL (ref 1.5–7.7)
NEUTROPHILS # BLD AUTO: 3.93 X10 (3) UL (ref 1.5–7.7)
NEUTROPHILS # BLD AUTO: 3.93 X10(3) UL (ref 1.5–7.7)
NEUTROPHILS # BLD AUTO: 4.42 X10 (3) UL (ref 1.5–7.7)
NEUTROPHILS # BLD AUTO: 4.42 X10(3) UL (ref 1.5–7.7)
NEUTROPHILS # BLD AUTO: 4.46 X10 (3) UL (ref 1.5–7.7)
NEUTROPHILS # BLD AUTO: 4.46 X10(3) UL (ref 1.5–7.7)
NEUTROPHILS # BLD AUTO: 9.13 X10 (3) UL (ref 1.5–7.7)
NEUTROPHILS # BLD AUTO: 9.13 X10(3) UL (ref 1.5–7.7)
NEUTROPHILS NFR BLD AUTO: 50.3 %
NEUTROPHILS NFR BLD AUTO: 52.8 %
NEUTROPHILS NFR BLD AUTO: 57.8 %
NEUTROPHILS NFR BLD AUTO: 57.8 %
NEUTROPHILS NFR BLD AUTO: 80.5 %
NEUTROPHILS NFR BLD AUTO: 86.3 %
NITRITE UR QL STRIP.AUTO: NEGATIVE
NT-PROBNP SERPL-MCNC: 230 PG/ML (ref ?–450)
OSMOLALITY SERPL CALC.SUM OF ELEC: 283 MOSM/KG (ref 275–295)
OSMOLALITY SERPL CALC.SUM OF ELEC: 285 MOSM/KG (ref 275–295)
OSMOLALITY SERPL CALC.SUM OF ELEC: 287 MOSM/KG (ref 275–295)
OSMOLALITY SERPL CALC.SUM OF ELEC: 291 MOSM/KG (ref 275–295)
OSMOLALITY SERPL CALC.SUM OF ELEC: 292 MOSM/KG (ref 275–295)
OSMOLALITY SERPL CALC.SUM OF ELEC: 293 MOSM/KG (ref 275–295)
P AXIS: 36 DEGREES
P-R INTERVAL: 192 MS
PARAINFLUENZA 1 PCR:: NEGATIVE
PARAINFLUENZA 2 PCR:: NEGATIVE
PARAINFLUENZA 3 PCR:: NEGATIVE
PARAINFLUENZA 4 PCR:: NEGATIVE
PH UR STRIP.AUTO: 5 [PH] (ref 4.5–8)
PLATELET # BLD AUTO: 211 10(3)UL (ref 150–450)
PLATELET # BLD AUTO: 226 10(3)UL (ref 150–450)
PLATELET # BLD AUTO: 249 10(3)UL (ref 150–450)
PLATELET # BLD AUTO: 251 10(3)UL (ref 150–450)
PLATELET # BLD AUTO: 255 10(3)UL (ref 150–450)
PLATELET # BLD AUTO: 258 10(3)UL (ref 150–450)
POTASSIUM SERPL-SCNC: 3.4 MMOL/L (ref 3.5–5.1)
POTASSIUM SERPL-SCNC: 3.6 MMOL/L (ref 3.5–5.1)
POTASSIUM SERPL-SCNC: 4 MMOL/L (ref 3.5–5.1)
POTASSIUM SERPL-SCNC: 4.1 MMOL/L (ref 3.5–5.1)
POTASSIUM SERPL-SCNC: 4.5 MMOL/L (ref 3.5–5.1)
PROCALCITONIN SERPL-MCNC: 0.14 NG/ML
PROT UR STRIP.AUTO-MCNC: NEGATIVE MG/DL
Q-T INTERVAL: 408 MS
QRS DURATION: 82 MS
QTC CALCULATION (BEZET): 459 MS
R AXIS: 67 DEGREES
RBC # BLD AUTO: 4.29 X10(6)UL (ref 3.8–5.3)
RBC # BLD AUTO: 4.44 X10(6)UL (ref 3.8–5.3)
RBC # BLD AUTO: 4.5 X10(6)UL (ref 3.8–5.3)
RBC # BLD AUTO: 4.56 X10(6)UL (ref 3.8–5.3)
RBC # BLD AUTO: 4.61 X10(6)UL (ref 3.8–5.3)
RBC # BLD AUTO: 4.91 X10(6)UL (ref 3.8–5.3)
RBC UR QL AUTO: NEGATIVE
RHINOVIRUS/ENTERO PCR:: NEGATIVE
RSV RNA SPEC QL NAA+PROBE: NEGATIVE
SODIUM SERPL-SCNC: 137 MMOL/L (ref 136–145)
SODIUM SERPL-SCNC: 138 MMOL/L (ref 136–145)
SODIUM SERPL-SCNC: 139 MMOL/L (ref 136–145)
SODIUM SERPL-SCNC: 140 MMOL/L (ref 136–145)
SODIUM SERPL-SCNC: 141 MMOL/L (ref 136–145)
SODIUM SERPL-SCNC: 141 MMOL/L (ref 136–145)
SP GR UR STRIP.AUTO: 1.01 (ref 1–1.03)
T AXIS: 61 DEGREES
TROPONIN I SERPL-MCNC: <0.045 NG/ML (ref ?–0.04)
UROBILINOGEN UR STRIP.AUTO-MCNC: <2 MG/DL
VENTRICULAR RATE: 76 BPM
WBC # BLD AUTO: 11.4 X10(3) UL (ref 4–11)
WBC # BLD AUTO: 3.6 X10(3) UL (ref 4–11)
WBC # BLD AUTO: 6.4 X10(3) UL (ref 4–11)
WBC # BLD AUTO: 7.7 X10(3) UL (ref 4–11)
WBC # BLD AUTO: 7.8 X10(3) UL (ref 4–11)
WBC # BLD AUTO: 8.4 X10(3) UL (ref 4–11)

## 2019-01-01 PROCEDURE — 94640 AIRWAY INHALATION TREATMENT: CPT

## 2019-01-01 PROCEDURE — 71045 X-RAY EXAM CHEST 1 VIEW: CPT | Performed by: FAMILY MEDICINE

## 2019-01-01 PROCEDURE — 83735 ASSAY OF MAGNESIUM: CPT | Performed by: FAMILY MEDICINE

## 2019-01-01 PROCEDURE — 94644 CONT INHLJ TX 1ST HOUR: CPT

## 2019-01-01 PROCEDURE — 94669 MECHANICAL CHEST WALL OSCILL: CPT

## 2019-01-01 PROCEDURE — 94668 MNPJ CHEST WALL SBSQ: CPT

## 2019-01-01 PROCEDURE — 85025 COMPLETE CBC W/AUTO DIFF WBC: CPT | Performed by: FAMILY MEDICINE

## 2019-01-01 PROCEDURE — 99285 EMERGENCY DEPT VISIT HI MDM: CPT

## 2019-01-01 PROCEDURE — 72040 X-RAY EXAM NECK SPINE 2-3 VW: CPT | Performed by: PHYSICIAN ASSISTANT

## 2019-01-01 PROCEDURE — 99213 OFFICE O/P EST LOW 20 MIN: CPT | Performed by: OBSTETRICS & GYNECOLOGY

## 2019-01-01 PROCEDURE — 83050 HGB METHEMOGLOBIN QUAN: CPT | Performed by: INTERNAL MEDICINE

## 2019-01-01 PROCEDURE — 82330 ASSAY OF CALCIUM: CPT | Performed by: EMERGENCY MEDICINE

## 2019-01-01 PROCEDURE — 71045 X-RAY EXAM CHEST 1 VIEW: CPT | Performed by: INTERNAL MEDICINE

## 2019-01-01 PROCEDURE — 83605 ASSAY OF LACTIC ACID: CPT | Performed by: EMERGENCY MEDICINE

## 2019-01-01 PROCEDURE — 84132 ASSAY OF SERUM POTASSIUM: CPT | Performed by: FAMILY MEDICINE

## 2019-01-01 PROCEDURE — 71046 X-RAY EXAM CHEST 2 VIEWS: CPT | Performed by: FAMILY MEDICINE

## 2019-01-01 PROCEDURE — 80053 COMPREHEN METABOLIC PANEL: CPT | Performed by: FAMILY MEDICINE

## 2019-01-01 PROCEDURE — 94667 MNPJ CHEST WALL 1ST: CPT

## 2019-01-01 PROCEDURE — 93010 ELECTROCARDIOGRAM REPORT: CPT

## 2019-01-01 PROCEDURE — 82803 BLOOD GASES ANY COMBINATION: CPT | Performed by: INTERNAL MEDICINE

## 2019-01-01 PROCEDURE — 93971 EXTREMITY STUDY: CPT | Performed by: EMERGENCY MEDICINE

## 2019-01-01 PROCEDURE — 87070 CULTURE OTHR SPECIMN AEROBIC: CPT | Performed by: FAMILY MEDICINE

## 2019-01-01 PROCEDURE — 85025 COMPLETE CBC W/AUTO DIFF WBC: CPT | Performed by: EMERGENCY MEDICINE

## 2019-01-01 PROCEDURE — 71046 X-RAY EXAM CHEST 2 VIEWS: CPT | Performed by: INTERNAL MEDICINE

## 2019-01-01 PROCEDURE — 87077 CULTURE AEROBIC IDENTIFY: CPT | Performed by: FAMILY MEDICINE

## 2019-01-01 PROCEDURE — 85018 HEMOGLOBIN: CPT | Performed by: INTERNAL MEDICINE

## 2019-01-01 PROCEDURE — 99213 OFFICE O/P EST LOW 20 MIN: CPT | Performed by: INTERNAL MEDICINE

## 2019-01-01 PROCEDURE — 94664 DEMO&/EVAL PT USE INHALER: CPT

## 2019-01-01 PROCEDURE — 1111F DSCHRG MED/CURRENT MED MERGE: CPT | Performed by: NEUROLOGICAL SURGERY

## 2019-01-01 PROCEDURE — 87581 M.PNEUMON DNA AMP PROBE: CPT | Performed by: INTERNAL MEDICINE

## 2019-01-01 PROCEDURE — 81003 URINALYSIS AUTO W/O SCOPE: CPT | Performed by: EMERGENCY MEDICINE

## 2019-01-01 PROCEDURE — 82375 ASSAY CARBOXYHB QUANT: CPT | Performed by: INTERNAL MEDICINE

## 2019-01-01 PROCEDURE — 36600 WITHDRAWAL OF ARTERIAL BLOOD: CPT | Performed by: EMERGENCY MEDICINE

## 2019-01-01 PROCEDURE — 99213 OFFICE O/P EST LOW 20 MIN: CPT | Performed by: NEUROLOGICAL SURGERY

## 2019-01-01 PROCEDURE — 36600 WITHDRAWAL OF ARTERIAL BLOOD: CPT | Performed by: INTERNAL MEDICINE

## 2019-01-01 PROCEDURE — 93970 EXTREMITY STUDY: CPT | Performed by: INTERNAL MEDICINE

## 2019-01-01 PROCEDURE — 97116 GAIT TRAINING THERAPY: CPT

## 2019-01-01 PROCEDURE — 72040 X-RAY EXAM NECK SPINE 2-3 VW: CPT | Performed by: NURSE PRACTITIONER

## 2019-01-01 PROCEDURE — 96374 THER/PROPH/DIAG INJ IV PUSH: CPT

## 2019-01-01 PROCEDURE — 87798 DETECT AGENT NOS DNA AMP: CPT | Performed by: INTERNAL MEDICINE

## 2019-01-01 PROCEDURE — 84132 ASSAY OF SERUM POTASSIUM: CPT | Performed by: INTERNAL MEDICINE

## 2019-01-01 PROCEDURE — 80053 COMPREHEN METABOLIC PANEL: CPT | Performed by: EMERGENCY MEDICINE

## 2019-01-01 PROCEDURE — 82330 ASSAY OF CALCIUM: CPT | Performed by: INTERNAL MEDICINE

## 2019-01-01 PROCEDURE — 82375 ASSAY CARBOXYHB QUANT: CPT | Performed by: EMERGENCY MEDICINE

## 2019-01-01 PROCEDURE — 84295 ASSAY OF SERUM SODIUM: CPT | Performed by: EMERGENCY MEDICINE

## 2019-01-01 PROCEDURE — 83605 ASSAY OF LACTIC ACID: CPT | Performed by: INTERNAL MEDICINE

## 2019-01-01 PROCEDURE — 82803 BLOOD GASES ANY COMBINATION: CPT | Performed by: EMERGENCY MEDICINE

## 2019-01-01 PROCEDURE — 5A09357 ASSISTANCE WITH RESPIRATORY VENTILATION, LESS THAN 24 CONSECUTIVE HOURS, CONTINUOUS POSITIVE AIRWAY PRESSURE: ICD-10-PCS | Performed by: FAMILY MEDICINE

## 2019-01-01 PROCEDURE — 71045 X-RAY EXAM CHEST 1 VIEW: CPT

## 2019-01-01 PROCEDURE — 87486 CHLMYD PNEUM DNA AMP PROBE: CPT | Performed by: INTERNAL MEDICINE

## 2019-01-01 PROCEDURE — 71045 X-RAY EXAM CHEST 1 VIEW: CPT | Performed by: EMERGENCY MEDICINE

## 2019-01-01 PROCEDURE — 87205 SMEAR GRAM STAIN: CPT | Performed by: FAMILY MEDICINE

## 2019-01-01 PROCEDURE — 84145 PROCALCITONIN (PCT): CPT | Performed by: FAMILY MEDICINE

## 2019-01-01 PROCEDURE — 92610 EVALUATE SWALLOWING FUNCTION: CPT

## 2019-01-01 PROCEDURE — 84484 ASSAY OF TROPONIN QUANT: CPT | Performed by: EMERGENCY MEDICINE

## 2019-01-01 PROCEDURE — 84132 ASSAY OF SERUM POTASSIUM: CPT | Performed by: EMERGENCY MEDICINE

## 2019-01-01 PROCEDURE — 92526 ORAL FUNCTION THERAPY: CPT

## 2019-01-01 PROCEDURE — 97161 PT EVAL LOW COMPLEX 20 MIN: CPT

## 2019-01-01 PROCEDURE — 99212 OFFICE O/P EST SF 10 MIN: CPT | Performed by: OBSTETRICS & GYNECOLOGY

## 2019-01-01 PROCEDURE — 84145 PROCALCITONIN (PCT): CPT | Performed by: INTERNAL MEDICINE

## 2019-01-01 PROCEDURE — 84443 ASSAY THYROID STIM HORMONE: CPT | Performed by: FAMILY MEDICINE

## 2019-01-01 PROCEDURE — 84295 ASSAY OF SERUM SODIUM: CPT | Performed by: INTERNAL MEDICINE

## 2019-01-01 PROCEDURE — 93005 ELECTROCARDIOGRAM TRACING: CPT

## 2019-01-01 PROCEDURE — 71250 CT THORAX DX C-: CPT | Performed by: FAMILY MEDICINE

## 2019-01-01 PROCEDURE — 85018 HEMOGLOBIN: CPT | Performed by: EMERGENCY MEDICINE

## 2019-01-01 PROCEDURE — 87999 UNLISTED MICROBIOLOGY PX: CPT

## 2019-01-01 PROCEDURE — 87633 RESP VIRUS 12-25 TARGETS: CPT | Performed by: INTERNAL MEDICINE

## 2019-01-01 PROCEDURE — 83880 ASSAY OF NATRIURETIC PEPTIDE: CPT | Performed by: INTERNAL MEDICINE

## 2019-01-01 PROCEDURE — 94660 CPAP INITIATION&MGMT: CPT

## 2019-01-01 PROCEDURE — 83050 HGB METHEMOGLOBIN QUAN: CPT | Performed by: EMERGENCY MEDICINE

## 2019-01-01 PROCEDURE — 85379 FIBRIN DEGRADATION QUANT: CPT | Performed by: INTERNAL MEDICINE

## 2019-01-01 RX ORDER — IPRATROPIUM BROMIDE AND ALBUTEROL SULFATE 2.5; .5 MG/3ML; MG/3ML
3 SOLUTION RESPIRATORY (INHALATION)
Status: DISCONTINUED | OUTPATIENT
Start: 2019-01-01 | End: 2019-01-01

## 2019-01-01 RX ORDER — LORAZEPAM 0.5 MG/1
0.5 TABLET ORAL EVERY 6 HOURS PRN
Status: DISCONTINUED | OUTPATIENT
Start: 2019-01-01 | End: 2019-01-01

## 2019-01-01 RX ORDER — PREDNISONE 20 MG/1
40 TABLET ORAL
Status: DISCONTINUED | OUTPATIENT
Start: 2019-01-01 | End: 2019-01-01

## 2019-01-01 RX ORDER — TRIAMTERENE AND HYDROCHLOROTHIAZIDE 37.5; 25 MG/1; MG/1
1 TABLET ORAL EVERY OTHER DAY
Status: DISCONTINUED | OUTPATIENT
Start: 2019-01-01 | End: 2019-01-01 | Stop reason: SDUPTHER

## 2019-01-01 RX ORDER — SODIUM CHLORIDE 9 MG/ML
INJECTION, SOLUTION INTRAVENOUS CONTINUOUS
Status: DISCONTINUED | OUTPATIENT
Start: 2019-01-01 | End: 2019-01-01

## 2019-01-01 RX ORDER — ACETAMINOPHEN 325 MG/1
650 TABLET ORAL EVERY 4 HOURS PRN
Status: DISCONTINUED | OUTPATIENT
Start: 2019-01-01 | End: 2019-01-01

## 2019-01-01 RX ORDER — ONDANSETRON 4 MG/1
4 TABLET, ORALLY DISINTEGRATING ORAL EVERY 6 HOURS PRN
Status: DISCONTINUED | OUTPATIENT
Start: 2019-01-01 | End: 2019-01-01

## 2019-01-01 RX ORDER — PAROXETINE HYDROCHLORIDE 20 MG/1
40 TABLET, FILM COATED ORAL DAILY
Status: DISCONTINUED | OUTPATIENT
Start: 2019-01-01 | End: 2019-01-01

## 2019-01-01 RX ORDER — ACETYLCYSTEINE 200 MG/ML
2 SOLUTION ORAL; RESPIRATORY (INHALATION) 3 TIMES DAILY
Status: DISCONTINUED | OUTPATIENT
Start: 2019-01-01 | End: 2019-01-01

## 2019-01-01 RX ORDER — LORAZEPAM 0.5 MG/1
0.5 TABLET ORAL EVERY 6 HOURS PRN
Qty: 100 TABLET | Refills: 1 | Status: SHIPPED | OUTPATIENT
Start: 2019-01-01 | End: 2020-01-01

## 2019-01-01 RX ORDER — BUDESONIDE 0.5 MG/2ML
0.5 INHALANT ORAL
Status: DISCONTINUED | OUTPATIENT
Start: 2019-01-01 | End: 2019-01-01

## 2019-01-01 RX ORDER — DILTIAZEM HYDROCHLORIDE 120 MG/1
120 CAPSULE, EXTENDED RELEASE ORAL DAILY
Status: DISCONTINUED | OUTPATIENT
Start: 2019-01-01 | End: 2019-01-01

## 2019-01-01 RX ORDER — LOSARTAN POTASSIUM 50 MG/1
50 TABLET ORAL DAILY
Refills: 3 | COMMUNITY
Start: 2019-01-01 | End: 2019-01-01

## 2019-01-01 RX ORDER — HYDROCODONE BITARTRATE AND ACETAMINOPHEN 5; 325 MG/1; MG/1
1 TABLET ORAL EVERY 4 HOURS PRN
Qty: 150 TABLET | Refills: 0 | Status: ON HOLD | OUTPATIENT
Start: 2019-01-01 | End: 2019-01-01

## 2019-01-01 RX ORDER — BUDESONIDE 0.5 MG/2ML
0.5 INHALANT ORAL
Qty: 30 CONTAINER | Refills: 3 | Status: SHIPPED | OUTPATIENT
Start: 2019-01-01 | End: 2020-01-01

## 2019-01-01 RX ORDER — PROMETHAZINE HYDROCHLORIDE AND CODEINE PHOSPHATE 6.25; 1 MG/5ML; MG/5ML
SOLUTION ORAL
Refills: 0 | Status: ON HOLD | COMMUNITY
Start: 2019-01-01 | End: 2019-01-01

## 2019-01-01 RX ORDER — ATORVASTATIN CALCIUM 20 MG/1
20 TABLET, FILM COATED ORAL NIGHTLY
Status: DISCONTINUED | OUTPATIENT
Start: 2019-01-01 | End: 2019-01-01

## 2019-01-01 RX ORDER — METHYLPREDNISOLONE SODIUM SUCCINATE 125 MG/2ML
60 INJECTION, POWDER, LYOPHILIZED, FOR SOLUTION INTRAMUSCULAR; INTRAVENOUS EVERY 6 HOURS
Status: DISCONTINUED | OUTPATIENT
Start: 2019-01-01 | End: 2019-01-01

## 2019-01-01 RX ORDER — ONDANSETRON 4 MG/1
4 TABLET, FILM COATED ORAL EVERY 6 HOURS PRN
Status: DISCONTINUED | OUTPATIENT
Start: 2019-01-01 | End: 2019-01-01

## 2019-01-01 RX ORDER — METHYLPREDNISOLONE SODIUM SUCCINATE 125 MG/2ML
125 INJECTION, POWDER, LYOPHILIZED, FOR SOLUTION INTRAMUSCULAR; INTRAVENOUS ONCE
Status: COMPLETED | OUTPATIENT
Start: 2019-01-01 | End: 2019-01-01

## 2019-01-01 RX ORDER — MONTELUKAST SODIUM 5 MG/1
10 TABLET, CHEWABLE ORAL NIGHTLY
Status: DISCONTINUED | OUTPATIENT
Start: 2019-01-01 | End: 2019-01-01

## 2019-01-01 RX ORDER — HYDROCODONE BITARTRATE AND ACETAMINOPHEN 5; 325 MG/1; MG/1
2 TABLET ORAL EVERY 4 HOURS PRN
Status: DISCONTINUED | OUTPATIENT
Start: 2019-01-01 | End: 2019-01-01

## 2019-01-01 RX ORDER — ENOXAPARIN SODIUM 100 MG/ML
40 INJECTION SUBCUTANEOUS DAILY
Status: DISCONTINUED | OUTPATIENT
Start: 2019-01-01 | End: 2019-01-01

## 2019-01-01 RX ORDER — FUROSEMIDE 10 MG/ML
20 INJECTION INTRAMUSCULAR; INTRAVENOUS ONCE
Status: COMPLETED | OUTPATIENT
Start: 2019-01-01 | End: 2019-01-01

## 2019-01-01 RX ORDER — FLUTICASONE PROPIONATE 50 MCG
1 SPRAY, SUSPENSION (ML) NASAL DAILY
Status: DISCONTINUED | OUTPATIENT
Start: 2019-01-01 | End: 2019-01-01

## 2019-01-01 RX ORDER — TEMAZEPAM 7.5 MG/1
7.5 CAPSULE ORAL NIGHTLY PRN
Status: DISCONTINUED | OUTPATIENT
Start: 2019-01-01 | End: 2019-01-01

## 2019-01-01 RX ORDER — METHYLPREDNISOLONE SODIUM SUCCINATE 40 MG/ML
40 INJECTION, POWDER, LYOPHILIZED, FOR SOLUTION INTRAMUSCULAR; INTRAVENOUS EVERY 8 HOURS
Status: COMPLETED | OUTPATIENT
Start: 2019-01-01 | End: 2019-01-01

## 2019-01-01 RX ORDER — CODEINE PHOSPHATE AND GUAIFENESIN 10; 100 MG/5ML; MG/5ML
5 SOLUTION ORAL EVERY 4 HOURS PRN
Status: DISCONTINUED | OUTPATIENT
Start: 2019-01-01 | End: 2019-01-01

## 2019-01-01 RX ORDER — ALBUTEROL SULFATE 2.5 MG/3ML
2.5 SOLUTION RESPIRATORY (INHALATION) ONCE
Status: DISCONTINUED | OUTPATIENT
Start: 2019-01-01 | End: 2019-01-01

## 2019-01-01 RX ORDER — METHYLPREDNISOLONE SODIUM SUCCINATE 125 MG/2ML
60 INJECTION, POWDER, LYOPHILIZED, FOR SOLUTION INTRAMUSCULAR; INTRAVENOUS EVERY 8 HOURS
Status: DISCONTINUED | OUTPATIENT
Start: 2019-01-01 | End: 2019-01-01

## 2019-01-01 RX ORDER — ONDANSETRON 2 MG/ML
4 INJECTION INTRAMUSCULAR; INTRAVENOUS EVERY 6 HOURS PRN
Status: DISCONTINUED | OUTPATIENT
Start: 2019-01-01 | End: 2019-01-01

## 2019-01-01 RX ORDER — HYDROCODONE BITARTRATE AND ACETAMINOPHEN 5; 325 MG/1; MG/1
1 TABLET ORAL EVERY 4 HOURS PRN
Status: DISCONTINUED | OUTPATIENT
Start: 2019-01-01 | End: 2019-01-01

## 2019-01-01 RX ORDER — TRIAMTERENE AND HYDROCHLOROTHIAZIDE 75; 50 MG/1; MG/1
0.5 TABLET ORAL EVERY OTHER DAY
Status: DISCONTINUED | OUTPATIENT
Start: 2019-01-01 | End: 2019-01-01

## 2019-01-01 RX ORDER — BISACODYL 10 MG
10 SUPPOSITORY, RECTAL RECTAL
Status: DISCONTINUED | OUTPATIENT
Start: 2019-01-01 | End: 2019-01-01

## 2019-01-01 RX ORDER — BENZONATATE 200 MG/1
200 CAPSULE ORAL 3 TIMES DAILY PRN
Status: DISCONTINUED | OUTPATIENT
Start: 2019-01-01 | End: 2019-01-01

## 2019-01-01 RX ORDER — POTASSIUM CHLORIDE 20 MEQ/1
40 TABLET, EXTENDED RELEASE ORAL ONCE
Status: COMPLETED | OUTPATIENT
Start: 2019-01-01 | End: 2019-01-01

## 2019-01-01 RX ORDER — DIPHENHYDRAMINE HCL 25 MG
25 CAPSULE ORAL EVERY 6 HOURS PRN
Status: DISCONTINUED | OUTPATIENT
Start: 2019-01-01 | End: 2019-01-01

## 2019-01-01 RX ORDER — POTASSIUM CHLORIDE 14.9 MG/ML
20 INJECTION INTRAVENOUS ONCE
Status: DISCONTINUED | OUTPATIENT
Start: 2019-01-01 | End: 2019-01-01

## 2019-01-01 RX ORDER — FLUCONAZOLE 200 MG/1
200 TABLET ORAL DAILY
Qty: 5 TABLET | Refills: 0 | Status: SHIPPED | OUTPATIENT
Start: 2019-01-01 | End: 2019-01-01

## 2019-01-01 RX ORDER — ACETAMINOPHEN 325 MG/1
650 TABLET ORAL EVERY 6 HOURS PRN
Status: DISCONTINUED | OUTPATIENT
Start: 2019-01-01 | End: 2019-01-01

## 2019-01-01 RX ORDER — GUAIFENESIN 600 MG
600 TABLET, EXTENDED RELEASE 12 HR ORAL 2 TIMES DAILY
Status: DISCONTINUED | OUTPATIENT
Start: 2019-01-01 | End: 2019-01-01

## 2019-01-01 RX ORDER — HYDROCODONE BITARTRATE AND ACETAMINOPHEN 5; 325 MG/1; MG/1
1 TABLET ORAL EVERY 4 HOURS PRN
Qty: 150 TABLET | Refills: 0 | Status: SHIPPED | OUTPATIENT
Start: 2019-01-01 | End: 2020-01-01

## 2019-01-01 RX ORDER — METHYLPREDNISOLONE SODIUM SUCCINATE 40 MG/ML
40 INJECTION, POWDER, LYOPHILIZED, FOR SOLUTION INTRAMUSCULAR; INTRAVENOUS EVERY 8 HOURS
Status: DISCONTINUED | OUTPATIENT
Start: 2019-01-01 | End: 2019-01-01

## 2019-01-01 RX ORDER — ALBUTEROL SULFATE 2.5 MG/3ML
2.5 SOLUTION RESPIRATORY (INHALATION) EVERY 2 HOUR PRN
Status: DISCONTINUED | OUTPATIENT
Start: 2019-01-01 | End: 2019-01-01

## 2019-01-01 RX ORDER — ENOXAPARIN SODIUM 100 MG/ML
40 INJECTION SUBCUTANEOUS EVERY EVENING
Status: DISCONTINUED | OUTPATIENT
Start: 2019-01-01 | End: 2019-01-01

## 2019-01-01 RX ORDER — POTASSIUM CHLORIDE 20 MEQ/1
40 TABLET, EXTENDED RELEASE ORAL EVERY 4 HOURS
Status: COMPLETED | OUTPATIENT
Start: 2019-01-01 | End: 2019-01-01

## 2019-01-01 RX ORDER — MORPHINE SULFATE 4 MG/ML
2 INJECTION, SOLUTION INTRAMUSCULAR; INTRAVENOUS EVERY 2 HOUR PRN
Status: DISCONTINUED | OUTPATIENT
Start: 2019-01-01 | End: 2019-01-01

## 2019-01-01 RX ORDER — ACETYLCYSTEINE 200 MG/ML
3 SOLUTION ORAL; RESPIRATORY (INHALATION) 3 TIMES DAILY
Status: DISCONTINUED | OUTPATIENT
Start: 2019-01-01 | End: 2019-01-01

## 2019-01-01 RX ORDER — POLYETHYLENE GLYCOL 3350 17 G/17G
17 POWDER, FOR SOLUTION ORAL DAILY PRN
Status: DISCONTINUED | OUTPATIENT
Start: 2019-01-01 | End: 2019-01-01

## 2019-01-01 RX ORDER — CLOTRIMAZOLE 1 %
1 CREAM (GRAM) TOPICAL 2 TIMES DAILY
Qty: 1 TUBE | Refills: 0 | Status: SHIPPED | OUTPATIENT
Start: 2019-01-01 | End: 2019-01-01

## 2019-01-01 RX ORDER — MORPHINE SULFATE 4 MG/ML
4 INJECTION, SOLUTION INTRAMUSCULAR; INTRAVENOUS EVERY 2 HOUR PRN
Status: DISCONTINUED | OUTPATIENT
Start: 2019-01-01 | End: 2019-01-01

## 2019-01-01 RX ORDER — POTASSIUM CHLORIDE 14.9 MG/ML
20 INJECTION INTRAVENOUS ONCE
Status: COMPLETED | OUTPATIENT
Start: 2019-01-01 | End: 2019-01-01

## 2019-01-01 RX ORDER — PREDNISONE 20 MG/1
TABLET ORAL
Qty: 30 TABLET | Refills: 0 | Status: ON HOLD | OUTPATIENT
Start: 2019-01-01 | End: 2019-01-01

## 2019-01-01 RX ORDER — POTASSIUM CHLORIDE 20 MEQ/1
40 TABLET, EXTENDED RELEASE ORAL EVERY 4 HOURS
Status: DISCONTINUED | OUTPATIENT
Start: 2019-01-01 | End: 2019-01-01

## 2019-01-01 RX ORDER — MORPHINE SULFATE 4 MG/ML
1 INJECTION, SOLUTION INTRAMUSCULAR; INTRAVENOUS EVERY 2 HOUR PRN
Status: DISCONTINUED | OUTPATIENT
Start: 2019-01-01 | End: 2019-01-01

## 2019-01-01 RX ORDER — PREDNISONE 10 MG/1
TABLET ORAL
Qty: 30 TABLET | Refills: 0 | Status: SHIPPED | OUTPATIENT
Start: 2019-01-01 | End: 2020-01-01

## 2019-01-01 RX ORDER — HYDROCODONE BITARTRATE AND HOMATROPINE METHYLBROMIDE ORAL SOLUTION 5; 1.5 MG/5ML; MG/5ML
5 LIQUID ORAL NIGHTLY PRN
Status: DISCONTINUED | OUTPATIENT
Start: 2019-01-01 | End: 2019-01-01

## 2019-01-01 RX ORDER — POTASSIUM CHLORIDE 29.8 MG/ML
40 INJECTION INTRAVENOUS ONCE
Status: DISCONTINUED | OUTPATIENT
Start: 2019-01-01 | End: 2019-01-01

## 2019-01-01 RX ORDER — FLUCONAZOLE 150 MG/1
TABLET ORAL
Qty: 2 TABLET | Refills: 0 | Status: ON HOLD | OUTPATIENT
Start: 2019-01-01 | End: 2019-01-01

## 2019-01-01 RX ORDER — MONTELUKAST SODIUM 5 MG/1
10 TABLET, CHEWABLE ORAL NIGHTLY
Qty: 30 TABLET | Refills: 5 | Status: SHIPPED | OUTPATIENT
Start: 2019-01-01

## 2019-01-01 RX ORDER — TRIAMTERENE AND HYDROCHLOROTHIAZIDE 37.5; 25 MG/1; MG/1
1 TABLET ORAL DAILY
Refills: 1 | Status: ON HOLD | COMMUNITY
Start: 2019-01-01 | End: 2020-01-01

## 2019-01-01 RX ORDER — LOSARTAN POTASSIUM 50 MG/1
50 TABLET ORAL DAILY
Status: DISCONTINUED | OUTPATIENT
Start: 2019-01-01 | End: 2019-01-01

## 2019-01-01 RX ORDER — METHYLPREDNISOLONE SODIUM SUCCINATE 40 MG/ML
40 INJECTION, POWDER, LYOPHILIZED, FOR SOLUTION INTRAMUSCULAR; INTRAVENOUS EVERY 12 HOURS
Status: DISCONTINUED | OUTPATIENT
Start: 2019-01-01 | End: 2019-01-01

## 2019-01-01 RX ORDER — FLUCONAZOLE 100 MG/1
200 TABLET ORAL DAILY
Status: DISCONTINUED | OUTPATIENT
Start: 2019-01-01 | End: 2019-01-01

## 2019-01-01 RX ORDER — IPRATROPIUM BROMIDE AND ALBUTEROL SULFATE 2.5; .5 MG/3ML; MG/3ML
3 SOLUTION RESPIRATORY (INHALATION) ONCE
Status: COMPLETED | OUTPATIENT
Start: 2019-01-01 | End: 2019-01-01

## 2019-01-01 RX ORDER — DOCUSATE SODIUM 100 MG/1
100 CAPSULE, LIQUID FILLED ORAL 2 TIMES DAILY
Status: DISCONTINUED | OUTPATIENT
Start: 2019-01-01 | End: 2019-01-01

## 2019-01-01 RX ORDER — FLUCONAZOLE 100 MG/1
100 TABLET ORAL DAILY
Status: DISCONTINUED | OUTPATIENT
Start: 2019-01-01 | End: 2019-01-01

## 2019-01-21 ENCOUNTER — OFFICE VISIT (OUTPATIENT)
Dept: RHEUMATOLOGY | Facility: CLINIC | Age: 84
End: 2019-01-21
Payer: MEDICARE

## 2019-01-21 VITALS — SYSTOLIC BLOOD PRESSURE: 102 MMHG | HEART RATE: 78 BPM | DIASTOLIC BLOOD PRESSURE: 74 MMHG | RESPIRATION RATE: 16 BRPM

## 2019-01-21 DIAGNOSIS — M47.816 SPONDYLOSIS OF LUMBAR REGION WITHOUT MYELOPATHY OR RADICULOPATHY: ICD-10-CM

## 2019-01-21 DIAGNOSIS — J44.9 CHRONIC OBSTRUCTIVE PULMONARY DISEASE, UNSPECIFIED COPD TYPE (HCC): ICD-10-CM

## 2019-01-21 DIAGNOSIS — M17.0 PRIMARY OSTEOARTHRITIS OF BOTH KNEES: Primary | ICD-10-CM

## 2019-01-21 PROBLEM — J44.1 COPD EXACERBATION (HCC): Status: RESOLVED | Noted: 2018-11-10 | Resolved: 2019-01-21

## 2019-01-21 PROCEDURE — 99213 OFFICE O/P EST LOW 20 MIN: CPT | Performed by: INTERNAL MEDICINE

## 2019-01-21 RX ORDER — DILTIAZEM HYDROCHLORIDE 120 MG/1
120 CAPSULE, EXTENDED RELEASE ORAL DAILY
Refills: 11 | COMMUNITY
Start: 2019-01-12 | End: 2020-01-01

## 2019-01-21 RX ORDER — CYCLOBENZAPRINE HCL 5 MG
5 TABLET ORAL 3 TIMES DAILY PRN
Qty: 90 TABLET | Refills: 2 | Status: CANCELLED | OUTPATIENT
Start: 2019-01-21

## 2019-01-21 RX ORDER — THEOPHYLLINE ANHYDROUS 200 MG/1
1 CAPSULE, EXTENDED RELEASE ORAL DAILY
Refills: 3 | Status: ON HOLD | COMMUNITY
Start: 2019-01-18 | End: 2019-03-22

## 2019-01-21 RX ORDER — PAROXETINE 30 MG/1
30 TABLET, FILM COATED ORAL EVERY MORNING
Qty: 90 TABLET | Refills: 3 | Status: SHIPPED | OUTPATIENT
Start: 2019-01-21 | End: 2019-06-10

## 2019-01-21 RX ORDER — HYDROCODONE BITARTRATE AND ACETAMINOPHEN 5; 325 MG/1; MG/1
1 TABLET ORAL EVERY 6 HOURS PRN
Qty: 120 TABLET | Refills: 0 | Status: SHIPPED | OUTPATIENT
Start: 2019-02-20 | End: 2019-02-01 | Stop reason: CLARIF

## 2019-01-21 RX ORDER — HYDROCODONE BITARTRATE AND ACETAMINOPHEN 5; 325 MG/1; MG/1
1 TABLET ORAL EVERY 6 HOURS PRN
Qty: 120 TABLET | Refills: 0 | Status: SHIPPED | OUTPATIENT
Start: 2019-03-22 | End: 2019-02-01 | Stop reason: CLARIF

## 2019-01-21 RX ORDER — HYDROCODONE BITARTRATE AND ACETAMINOPHEN 5; 325 MG/1; MG/1
1 TABLET ORAL EVERY 6 HOURS PRN
Qty: 120 TABLET | Refills: 0 | Status: SHIPPED | OUTPATIENT
Start: 2019-01-21 | End: 2019-02-01 | Stop reason: CLARIF

## 2019-01-21 RX ORDER — HYDROCODONE BITARTRATE AND ACETAMINOPHEN 5; 325 MG/1; MG/1
1 TABLET ORAL EVERY 4 HOURS PRN
Refills: 0 | Status: ON HOLD | COMMUNITY
Start: 2018-12-22 | End: 2019-01-01

## 2019-01-21 RX ORDER — LORAZEPAM 0.5 MG/1
0.5 TABLET ORAL EVERY 6 HOURS PRN
Qty: 100 TABLET | Refills: 1 | Status: SHIPPED | OUTPATIENT
Start: 2019-01-21 | End: 2019-01-01

## 2019-01-21 NOTE — PATIENT INSTRUCTIONS
Arthritis pain you can take an occasional Aleve 1 or 2 a day. Aleve is over-the-counter. If the Aleve does not help then use Celebrex 200 mg again 1 or 2 a day to relieve arthritis pain. For severe pain take Norco 5 mg 1 tablet every 6 hours as needed.

## 2019-01-21 NOTE — PROGRESS NOTES
[facility]  Dr. Jamie Moore Progress Note     Subjective:   Margaret Link is a(n) 81year old female. Current complaints: Patient presents with:  Joint Pain: DDD. 3 month f/u. Pt continues with bilateral knee pain and lower back pain. C/o depression.   C

## 2019-01-29 ENCOUNTER — TELEPHONE (OUTPATIENT)
Dept: RHEUMATOLOGY | Facility: CLINIC | Age: 84
End: 2019-01-29

## 2019-02-01 ENCOUNTER — APPOINTMENT (OUTPATIENT)
Dept: GENERAL RADIOLOGY | Facility: HOSPITAL | Age: 84
DRG: 191 | End: 2019-02-01
Payer: MEDICARE

## 2019-02-01 ENCOUNTER — HOSPITAL ENCOUNTER (INPATIENT)
Facility: HOSPITAL | Age: 84
LOS: 4 days | Discharge: HOME | DRG: 191 | End: 2019-02-05
Attending: EMERGENCY MEDICINE | Admitting: FAMILY MEDICINE
Payer: MEDICARE

## 2019-02-01 DIAGNOSIS — J44.1 COPD EXACERBATION (HCC): Primary | ICD-10-CM

## 2019-02-01 LAB
ALBUMIN SERPL-MCNC: 3.6 G/DL (ref 3.1–4.5)
ALBUMIN/GLOB SERPL: 1.1 {RATIO} (ref 1–2)
ALP LIVER SERPL-CCNC: 67 U/L (ref 55–142)
ALT SERPL-CCNC: 15 U/L (ref 14–54)
ANION GAP SERPL CALC-SCNC: 6 MMOL/L (ref 0–18)
AST SERPL-CCNC: 18 U/L (ref 15–41)
BASOPHILS # BLD AUTO: 0.06 X10(3) UL (ref 0–0.2)
BASOPHILS NFR BLD AUTO: 0.7 %
BILIRUB SERPL-MCNC: 0.2 MG/DL (ref 0.1–2)
BUN BLD-MCNC: 9 MG/DL (ref 8–20)
BUN/CREAT SERPL: 11.5 (ref 10–20)
CALCIUM BLD-MCNC: 9.1 MG/DL (ref 8.3–10.3)
CHLORIDE SERPL-SCNC: 99 MMOL/L (ref 101–111)
CO2 SERPL-SCNC: 28 MMOL/L (ref 22–32)
CREAT BLD-MCNC: 0.78 MG/DL (ref 0.55–1.02)
DEPRECATED RDW RBC AUTO: 43 FL (ref 35.1–46.3)
EOSINOPHIL # BLD AUTO: 0.61 X10(3) UL (ref 0–0.7)
EOSINOPHIL NFR BLD AUTO: 7.6 %
ERYTHROCYTE [DISTWIDTH] IN BLOOD BY AUTOMATED COUNT: 13.2 % (ref 11–15)
GLOBULIN PLAS-MCNC: 3.4 G/DL (ref 2.8–4.4)
GLUCOSE BLD-MCNC: 99 MG/DL (ref 70–99)
HCT VFR BLD AUTO: 44.2 % (ref 35–48)
HGB BLD-MCNC: 14.8 G/DL (ref 12–16)
IMM GRANULOCYTES # BLD AUTO: 0.04 X10(3) UL (ref 0–1)
IMM GRANULOCYTES NFR BLD: 0.5 %
LYMPHOCYTES # BLD AUTO: 2.4 X10(3) UL (ref 1–4)
LYMPHOCYTES NFR BLD AUTO: 30 %
M PROTEIN MFR SERPL ELPH: 7 G/DL (ref 6.4–8.2)
MCH RBC QN AUTO: 29.8 PG (ref 26–34)
MCHC RBC AUTO-ENTMCNC: 33.5 G/DL (ref 31–37)
MCV RBC AUTO: 89.1 FL (ref 80–100)
MONOCYTES # BLD AUTO: 0.72 X10(3) UL (ref 0.1–1)
MONOCYTES NFR BLD AUTO: 9 %
NEUTROPHILS # BLD AUTO: 4.18 X10 (3) UL (ref 1.5–7.7)
NEUTROPHILS # BLD AUTO: 4.18 X10(3) UL (ref 1.5–7.7)
NEUTROPHILS NFR BLD AUTO: 52.2 %
OSMOLALITY SERPL CALC.SUM OF ELEC: 275 MOSM/KG (ref 275–295)
PLATELET # BLD AUTO: 304 10(3)UL (ref 150–450)
POTASSIUM SERPL-SCNC: 4.3 MMOL/L (ref 3.6–5.1)
RBC # BLD AUTO: 4.96 X10(6)UL (ref 3.8–5.3)
SODIUM SERPL-SCNC: 133 MMOL/L (ref 136–144)
WBC # BLD AUTO: 8 X10(3) UL (ref 4–11)

## 2019-02-01 PROCEDURE — 99285 EMERGENCY DEPT VISIT HI MDM: CPT

## 2019-02-01 PROCEDURE — 87040 BLOOD CULTURE FOR BACTERIA: CPT | Performed by: FAMILY MEDICINE

## 2019-02-01 PROCEDURE — 85025 COMPLETE CBC W/AUTO DIFF WBC: CPT | Performed by: PHYSICIAN ASSISTANT

## 2019-02-01 PROCEDURE — 94640 AIRWAY INHALATION TREATMENT: CPT

## 2019-02-01 PROCEDURE — 71046 X-RAY EXAM CHEST 2 VIEWS: CPT

## 2019-02-01 PROCEDURE — 94664 DEMO&/EVAL PT USE INHALER: CPT

## 2019-02-01 PROCEDURE — 80053 COMPREHEN METABOLIC PANEL: CPT | Performed by: PHYSICIAN ASSISTANT

## 2019-02-01 PROCEDURE — 96374 THER/PROPH/DIAG INJ IV PUSH: CPT

## 2019-02-01 RX ORDER — CODEINE PHOSPHATE AND GUAIFENESIN 10; 100 MG/5ML; MG/5ML
5 SOLUTION ORAL EVERY 4 HOURS PRN
Status: DISCONTINUED | OUTPATIENT
Start: 2019-02-01 | End: 2019-02-05

## 2019-02-01 RX ORDER — TEMAZEPAM 7.5 MG/1
7.5 CAPSULE ORAL NIGHTLY PRN
Status: DISCONTINUED | OUTPATIENT
Start: 2019-02-01 | End: 2019-02-02

## 2019-02-01 RX ORDER — ACETAMINOPHEN 325 MG/1
650 TABLET ORAL EVERY 6 HOURS PRN
Status: DISCONTINUED | OUTPATIENT
Start: 2019-02-01 | End: 2019-02-05

## 2019-02-01 RX ORDER — ATORVASTATIN CALCIUM 20 MG/1
20 TABLET, FILM COATED ORAL DAILY
Status: DISCONTINUED | OUTPATIENT
Start: 2019-02-02 | End: 2019-02-05

## 2019-02-01 RX ORDER — IPRATROPIUM BROMIDE AND ALBUTEROL SULFATE 2.5; .5 MG/3ML; MG/3ML
3 SOLUTION RESPIRATORY (INHALATION) ONCE
Status: COMPLETED | OUTPATIENT
Start: 2019-02-01 | End: 2019-02-01

## 2019-02-01 RX ORDER — LEVOFLOXACIN 5 MG/ML
750 INJECTION, SOLUTION INTRAVENOUS EVERY 24 HOURS
Status: DISCONTINUED | OUTPATIENT
Start: 2019-02-01 | End: 2019-02-02

## 2019-02-01 RX ORDER — ALBUTEROL SULFATE 2.5 MG/3ML
2.5 SOLUTION RESPIRATORY (INHALATION) EVERY 6 HOURS PRN
Status: ON HOLD | COMMUNITY
End: 2019-02-05

## 2019-02-01 RX ORDER — LORAZEPAM 0.5 MG/1
0.5 TABLET ORAL EVERY 6 HOURS PRN
Status: DISCONTINUED | OUTPATIENT
Start: 2019-02-01 | End: 2019-02-03

## 2019-02-01 RX ORDER — IPRATROPIUM BROMIDE AND ALBUTEROL SULFATE 2.5; .5 MG/3ML; MG/3ML
3 SOLUTION RESPIRATORY (INHALATION) EVERY 4 HOURS
Status: DISCONTINUED | OUTPATIENT
Start: 2019-02-02 | End: 2019-02-02

## 2019-02-01 RX ORDER — HYDROCODONE BITARTRATE AND ACETAMINOPHEN 5; 325 MG/1; MG/1
1 TABLET ORAL EVERY 6 HOURS PRN
Status: DISCONTINUED | OUTPATIENT
Start: 2019-02-01 | End: 2019-02-05

## 2019-02-01 RX ORDER — NICOTINE 21 MG/24HR
1 PATCH, TRANSDERMAL 24 HOURS TRANSDERMAL DAILY
Status: DISCONTINUED | OUTPATIENT
Start: 2019-02-01 | End: 2019-02-02

## 2019-02-01 RX ORDER — METHYLPREDNISOLONE SODIUM SUCCINATE 125 MG/2ML
60 INJECTION, POWDER, LYOPHILIZED, FOR SOLUTION INTRAMUSCULAR; INTRAVENOUS EVERY 8 HOURS
Status: DISCONTINUED | OUTPATIENT
Start: 2019-02-02 | End: 2019-02-02

## 2019-02-01 RX ORDER — METHYLPREDNISOLONE SODIUM SUCCINATE 125 MG/2ML
80 INJECTION, POWDER, LYOPHILIZED, FOR SOLUTION INTRAMUSCULAR; INTRAVENOUS EVERY 8 HOURS
Status: DISCONTINUED | OUTPATIENT
Start: 2019-02-01 | End: 2019-02-01

## 2019-02-01 RX ORDER — GUAIFENESIN 600 MG
600 TABLET, EXTENDED RELEASE 12 HR ORAL 2 TIMES DAILY
Status: DISCONTINUED | OUTPATIENT
Start: 2019-02-01 | End: 2019-02-05

## 2019-02-01 RX ORDER — SODIUM CHLORIDE 9 MG/ML
INJECTION, SOLUTION INTRAVENOUS CONTINUOUS
Status: DISCONTINUED | OUTPATIENT
Start: 2019-02-01 | End: 2019-02-02

## 2019-02-01 RX ORDER — DILTIAZEM HYDROCHLORIDE 120 MG/1
120 CAPSULE, EXTENDED RELEASE ORAL DAILY
Status: DISCONTINUED | OUTPATIENT
Start: 2019-02-02 | End: 2019-02-05

## 2019-02-01 RX ORDER — FAMOTIDINE 20 MG/1
40 TABLET ORAL 2 TIMES DAILY
Status: DISCONTINUED | OUTPATIENT
Start: 2019-02-01 | End: 2019-02-05

## 2019-02-01 RX ORDER — ACETAMINOPHEN / DIPHENHYDRAMINE 25; 500 MG/1; MG/1
1 TABLET ORAL NIGHTLY
COMMUNITY
End: 2019-04-24 | Stop reason: CLARIF

## 2019-02-01 RX ORDER — TRIAMTERENE AND HYDROCHLOROTHIAZIDE 37.5; 25 MG/1; MG/1
1 CAPSULE ORAL DAILY
Status: DISCONTINUED | OUTPATIENT
Start: 2019-02-02 | End: 2019-02-05

## 2019-02-01 RX ORDER — METHYLPREDNISOLONE SODIUM SUCCINATE 125 MG/2ML
125 INJECTION, POWDER, LYOPHILIZED, FOR SOLUTION INTRAMUSCULAR; INTRAVENOUS ONCE
Status: COMPLETED | OUTPATIENT
Start: 2019-02-01 | End: 2019-02-01

## 2019-02-01 RX ORDER — IPRATROPIUM BROMIDE AND ALBUTEROL SULFATE 2.5; .5 MG/3ML; MG/3ML
3 SOLUTION RESPIRATORY (INHALATION)
Status: DISCONTINUED | OUTPATIENT
Start: 2019-02-02 | End: 2019-02-04

## 2019-02-01 RX ORDER — ATORVASTATIN CALCIUM 20 MG/1
20 TABLET, FILM COATED ORAL NIGHTLY
Status: DISCONTINUED | OUTPATIENT
Start: 2019-02-01 | End: 2019-02-01

## 2019-02-01 NOTE — ED PROVIDER NOTES
Patient Seen in: [facility] Emergency Department    History   Patient presents with:  Cough/URI  Dyspnea BACILIO SOB (respiratory)    Stated Complaint: cough for past month, worsening shortness of breath, hx of COPD    HPI    CHIEF COMPLAINT: Shortness cell to left cheek   • COPD (chronic obstructive pulmonary disease) (HCC)    • Hearing impairment    • High cholesterol    • History of blood transfusion    • Osteoarthrosis, unspecified whether generalized or localized, unspecified site    • Other and uns COPD  Other systems are as noted in HPI. Constitutional and vital signs reviewed. All other systems reviewed and negative except as noted above.     Physical Exam     ED Triage Vitals [02/01/19 1256]   /85   Pulse 75   Resp 20   Temp 98.4 °F (36 Please view results for these tests on the individual orders. CBC W/ DIFFERENTIAL   Patient IV established, labs drawn. Patient's CBC and CMP were unremarkable.   It was given a DuoNeb, 125 Solu-Medrol    Xr Chest Pa + Lat Chest (cpt=71046)    Result

## 2019-02-01 NOTE — ED NOTES
Pt ambulated in mallory, pt's oxygen saturation dropped to 87% while ambulating, pt with increased BACILIO upon returning to room. Kishore Blinks notified.

## 2019-02-02 ENCOUNTER — APPOINTMENT (OUTPATIENT)
Dept: GENERAL RADIOLOGY | Facility: HOSPITAL | Age: 84
DRG: 191 | End: 2019-02-02
Attending: FAMILY MEDICINE
Payer: MEDICARE

## 2019-02-02 LAB
ADENOVIRUS PCR:: NEGATIVE
ALBUMIN SERPL-MCNC: 3.3 G/DL (ref 3.1–4.5)
ALBUMIN/GLOB SERPL: 0.9 {RATIO} (ref 1–2)
ALP LIVER SERPL-CCNC: 64 U/L (ref 55–142)
ALT SERPL-CCNC: 17 U/L (ref 14–54)
ANION GAP SERPL CALC-SCNC: 8 MMOL/L (ref 0–18)
AST SERPL-CCNC: 18 U/L (ref 15–41)
B PERT DNA SPEC QL NAA+PROBE: NEGATIVE
BASOPHILS # BLD AUTO: 0 X10(3) UL (ref 0–0.2)
BASOPHILS NFR BLD AUTO: 0 %
BILIRUB SERPL-MCNC: 0.4 MG/DL (ref 0.1–2)
BILIRUB UR QL STRIP.AUTO: NEGATIVE
BUN BLD-MCNC: 10 MG/DL (ref 8–20)
BUN/CREAT SERPL: 13.5 (ref 10–20)
C PNEUM DNA SPEC QL NAA+PROBE: NEGATIVE
CALCIUM BLD-MCNC: 8.7 MG/DL (ref 8.3–10.3)
CHLORIDE SERPL-SCNC: 99 MMOL/L (ref 101–111)
CLARITY UR REFRACT.AUTO: CLEAR
CO2 SERPL-SCNC: 26 MMOL/L (ref 22–32)
COLOR UR AUTO: YELLOW
CORONAVIRUS 229E PCR:: NEGATIVE
CORONAVIRUS HKU1 PCR:: NEGATIVE
CORONAVIRUS NL63 PCR:: NEGATIVE
CORONAVIRUS OC43 PCR:: NEGATIVE
CREAT BLD-MCNC: 0.74 MG/DL (ref 0.55–1.02)
DEPRECATED RDW RBC AUTO: 41.3 FL (ref 35.1–46.3)
EOSINOPHIL # BLD AUTO: 0 X10(3) UL (ref 0–0.7)
EOSINOPHIL NFR BLD AUTO: 0 %
ERYTHROCYTE [DISTWIDTH] IN BLOOD BY AUTOMATED COUNT: 12.9 % (ref 11–15)
FLUAV RNA SPEC QL NAA+PROBE: NEGATIVE
FLUBV RNA SPEC QL NAA+PROBE: NEGATIVE
GLOBULIN PLAS-MCNC: 3.5 G/DL (ref 2.8–4.4)
GLUCOSE BLD-MCNC: 141 MG/DL (ref 70–99)
GLUCOSE UR STRIP.AUTO-MCNC: 50 MG/DL
HCT VFR BLD AUTO: 39.8 % (ref 35–48)
HGB BLD-MCNC: 13.8 G/DL (ref 12–16)
IMM GRANULOCYTES # BLD AUTO: 0.02 X10(3) UL (ref 0–1)
IMM GRANULOCYTES NFR BLD: 0.5 %
KETONES UR STRIP.AUTO-MCNC: NEGATIVE MG/DL
LEUKOCYTE ESTERASE UR QL STRIP.AUTO: NEGATIVE
LYMPHOCYTES # BLD AUTO: 0.73 X10(3) UL (ref 1–4)
LYMPHOCYTES NFR BLD AUTO: 17.8 %
M PROTEIN MFR SERPL ELPH: 6.8 G/DL (ref 6.4–8.2)
MCH RBC QN AUTO: 30.5 PG (ref 26–34)
MCHC RBC AUTO-ENTMCNC: 34.7 G/DL (ref 31–37)
MCV RBC AUTO: 87.9 FL (ref 80–100)
METAPNEUMOVIRUS PCR:: NEGATIVE
MONOCYTES # BLD AUTO: 0.11 X10(3) UL (ref 0.1–1)
MONOCYTES NFR BLD AUTO: 2.7 %
MYCOPLASMA PNEUMONIA PCR:: NEGATIVE
NEUTROPHILS # BLD AUTO: 3.24 X10 (3) UL (ref 1.5–7.7)
NEUTROPHILS # BLD AUTO: 3.24 X10(3) UL (ref 1.5–7.7)
NEUTROPHILS NFR BLD AUTO: 79 %
NITRITE UR QL STRIP.AUTO: NEGATIVE
OSMOLALITY SERPL CALC.SUM OF ELEC: 277 MOSM/KG (ref 275–295)
PARAINFLUENZA 1 PCR:: NEGATIVE
PARAINFLUENZA 2 PCR:: NEGATIVE
PARAINFLUENZA 3 PCR:: NEGATIVE
PARAINFLUENZA 4 PCR:: NEGATIVE
PH UR STRIP.AUTO: 6 [PH] (ref 4.5–8)
PLATELET # BLD AUTO: 257 10(3)UL (ref 150–450)
POTASSIUM SERPL-SCNC: 3.7 MMOL/L (ref 3.6–5.1)
PROCALCITONIN SERPL-MCNC: <0.11 NG/ML
PROT UR STRIP.AUTO-MCNC: NEGATIVE MG/DL
RBC # BLD AUTO: 4.53 X10(6)UL (ref 3.8–5.3)
RBC UR QL AUTO: NEGATIVE
RHINOVIRUS/ENTERO PCR:: NEGATIVE
RSV RNA SPEC QL NAA+PROBE: NEGATIVE
SODIUM SERPL-SCNC: 133 MMOL/L (ref 136–144)
SP GR UR STRIP.AUTO: 1.02 (ref 1–1.03)
UROBILINOGEN UR STRIP.AUTO-MCNC: <2 MG/DL
WBC # BLD AUTO: 4.1 X10(3) UL (ref 4–11)

## 2019-02-02 PROCEDURE — 87999 UNLISTED MICROBIOLOGY PX: CPT

## 2019-02-02 PROCEDURE — 94640 AIRWAY INHALATION TREATMENT: CPT

## 2019-02-02 PROCEDURE — 94664 DEMO&/EVAL PT USE INHALER: CPT

## 2019-02-02 PROCEDURE — 87633 RESP VIRUS 12-25 TARGETS: CPT | Performed by: FAMILY MEDICINE

## 2019-02-02 PROCEDURE — 87486 CHLMYD PNEUM DNA AMP PROBE: CPT | Performed by: FAMILY MEDICINE

## 2019-02-02 PROCEDURE — 87581 M.PNEUMON DNA AMP PROBE: CPT | Performed by: FAMILY MEDICINE

## 2019-02-02 PROCEDURE — 81003 URINALYSIS AUTO W/O SCOPE: CPT | Performed by: FAMILY MEDICINE

## 2019-02-02 PROCEDURE — 71046 X-RAY EXAM CHEST 2 VIEWS: CPT | Performed by: FAMILY MEDICINE

## 2019-02-02 PROCEDURE — 87798 DETECT AGENT NOS DNA AMP: CPT | Performed by: FAMILY MEDICINE

## 2019-02-02 PROCEDURE — 71045 X-RAY EXAM CHEST 1 VIEW: CPT | Performed by: FAMILY MEDICINE

## 2019-02-02 PROCEDURE — 94667 MNPJ CHEST WALL 1ST: CPT

## 2019-02-02 PROCEDURE — 84145 PROCALCITONIN (PCT): CPT | Performed by: FAMILY MEDICINE

## 2019-02-02 PROCEDURE — 80053 COMPREHEN METABOLIC PANEL: CPT | Performed by: FAMILY MEDICINE

## 2019-02-02 PROCEDURE — 85025 COMPLETE CBC W/AUTO DIFF WBC: CPT | Performed by: FAMILY MEDICINE

## 2019-02-02 RX ORDER — TEMAZEPAM 15 MG/1
15 CAPSULE ORAL NIGHTLY PRN
Status: DISCONTINUED | OUTPATIENT
Start: 2019-02-02 | End: 2019-02-05

## 2019-02-02 RX ORDER — METHYLPREDNISOLONE SODIUM SUCCINATE 40 MG/ML
40 INJECTION, POWDER, LYOPHILIZED, FOR SOLUTION INTRAMUSCULAR; INTRAVENOUS EVERY 8 HOURS
Status: COMPLETED | OUTPATIENT
Start: 2019-02-02 | End: 2019-02-03

## 2019-02-02 RX ORDER — POTASSIUM CHLORIDE 20 MEQ/1
40 TABLET, EXTENDED RELEASE ORAL ONCE
Status: COMPLETED | OUTPATIENT
Start: 2019-02-02 | End: 2019-02-02

## 2019-02-02 RX ORDER — ENOXAPARIN SODIUM 100 MG/ML
40 INJECTION SUBCUTANEOUS NIGHTLY
Status: DISCONTINUED | OUTPATIENT
Start: 2019-02-02 | End: 2019-02-05

## 2019-02-02 NOTE — PROGRESS NOTES
[facility] Note:  Renal Adjustment for levofloxacin (Jayden Bishop)    Anderson Buerger is a 81year old female who has been prescribed levofloxacin (LEVAQUIN) 500 mg every 24 hrs. CrCl is estimated creatinine clearance is 50 mL/min (based on SCr of 0.78 mg/dL).

## 2019-02-02 NOTE — PROGRESS NOTES
NURSING ADMISSION NOTE      Patient ARRIVED FROM THE E.D.via Cart AT 2057 AND WAS ADMITTED TO ROOM 506 WITH DX OF COPD. SHE IS A&OX4 AND IN NO APPARENT DISTRESS.  + NON PRODUCTIVE COUGH, NO FEVER.   SATURATING ABOVE 92% ON ROOM AIR. +SOB WITH ACT

## 2019-02-02 NOTE — PROGRESS NOTES
[facility]  Progress Note    Chepe Part Patient Status:  Inpatient    1935 MRN [id number]   [facility] 5NW-A Attending Eugenio Garner MD   Hosp Day # 1 PCP Shawnee Cerna MD       SUBJECTIVE:  No   REMAINS SOB  DIFF SLEEPING mg 120 mg Oral Daily   famoTIDine (PEPCID) tab 40 mg 40 mg Oral BID   Fluticasone Furoate-Vilanterol (BREO ELLIPTA) 200-25 MCG/INH inhaler 1 puff 1 puff Inhalation Daily   GuaiFENesin ER (MUCINEX) 12 hr tab 600 mg 600 mg Oral BID   HYDROcodone-acetaminophe

## 2019-02-02 NOTE — H&P
[facility]  History & Physical    Becky Siddiqui Patient Status:  Inpatient    1935 MRN [id number]   [facility] 5NW-A Attending Jerry Powers MD   Hosp Day # 1 PCP Audrey Donovan MD     History of Present Illness:  Becky Siddiqui • HERNIA SURGERY  05/18/16   • HERNIA VENTRAL REPAIR N/A 5/18/2016    Performed by Samuel Meyer MD at [address] [place] [address] - LEFT N/A 9/17/2014    Performed by Samuel Meyer MD at [facility] MAIN OR   • LUMBAR FACET INJECTION Ri mouth daily. Disp:  Rfl: 11 2/1/2019 at 0900   AGGIE-24 200 MG Oral Capsule SR 24 Hr Take 1 capsule by mouth daily. Disp:  Rfl: 3 Past Week at Unknown time   HYDROcodone-acetaminophen 5-325 MG Oral Tab Take 1 tablet by mouth every 6 (six) hours as needed.  D age   Head:    Normocephalic, without obvious abnormality, atraumatic   Eyes:    PERRL, conjunctiva/corneas clear, EOM's intact, fundi     benign, both eyes   Ears:    Normal TM's and external ear canals, both ears   Nose:   Nares normal, septum midline, m Problem List:     HTN (hypertension)     Anxiety     Tobacco abuse, episodic     Pericolonic abscess due to diverticulitis     Diverticulosis     Fistula     Pain management     Pericardial effusion     Pericarditis     At risk for falling     Spondylosis

## 2019-02-02 NOTE — PROGRESS NOTES
02/02/19 1547   Clinical Encounter Type   Visited With Patient   Routine Visit Introduction   Continue Visiting No   Jewish Encounters   Spiritual Requests During Visit / Hospitalization [address]

## 2019-02-02 NOTE — PROGRESS NOTES
[facility] Resource Referral Counselor Note    Rahel Toney Patient Status:  Inpatient    1935 MRN [id number]   [facility] 5NW-A Attending Elda Vail MD   Hosp Day # 1 PCP Jane Oden MD       S(subjective) \" I am

## 2019-02-02 NOTE — CONSULTS
Will Morales [address] [facility]  Pulmonary/Critical Care Consult Note  [facility]  Report of Consultation    Cleta Marge Patient Status:  Inpatient    1935 MRN [id number]   [facility] 5NW-A Attending Jazmin Alexander HERNIA VENTRAL REPAIR N/A 5/18/2016    Performed by Waylon Loo MD at [facility] MAIN OR   • LAPAROSCOPIC COLON RESECTION - LEFT N/A 9/17/2014    Performed by Waylon Loo MD at [facility] MAIN OR   • LUMBAR FACET INJECTION Right 6/22/2015    Performed by Darius Qiu visual disturbance, irritation and redness.   Ears, nose, mouth, throat, and face: Negative for hearing loss, tinnitus, nasal congestion, snoring, sore throat, note HPI  Cardiovascular: Negative for chest pain, palpitations, irregular heart beats, syncope, distress  PSYCH: Normal mood and affect, AAOX3  NEURO: CN 2-12 grossly intact, no focal deficits  HEENT: Atraumatic, normocephalic, EOMI, no icterus/hemorrhage, no conjunctival injection/discharge, nares normal  MOUTH: MMM, good dentition  NECK: Trachea mi Radiology:     Xr Chest Pa + Lat Chest (cpt=71046)    Result Date: 2/2/2019  CONCLUSION:  Atelectasis/infiltrate in the right lung base with trace right pleural effusion.     Dictated by: Jordan Ruano MD on 2/02/2019 at 9:16     Approved by: Yanna Orellana

## 2019-02-03 ENCOUNTER — APPOINTMENT (OUTPATIENT)
Dept: GENERAL RADIOLOGY | Facility: HOSPITAL | Age: 84
DRG: 191 | End: 2019-02-03
Attending: FAMILY MEDICINE
Payer: MEDICARE

## 2019-02-03 LAB
ALBUMIN SERPL-MCNC: 3.6 G/DL (ref 3.1–4.5)
ALBUMIN/GLOB SERPL: 1.1 {RATIO} (ref 1–2)
ALP LIVER SERPL-CCNC: 60 U/L (ref 55–142)
ALT SERPL-CCNC: 19 U/L (ref 14–54)
ANION GAP SERPL CALC-SCNC: 7 MMOL/L (ref 0–18)
AST SERPL-CCNC: 23 U/L (ref 15–41)
BASOPHILS # BLD AUTO: 0 X10(3) UL (ref 0–0.2)
BASOPHILS NFR BLD AUTO: 0 %
BILIRUB SERPL-MCNC: 0.3 MG/DL (ref 0.1–2)
BUN BLD-MCNC: 12 MG/DL (ref 8–20)
BUN/CREAT SERPL: 16 (ref 10–20)
CALCIUM BLD-MCNC: 9 MG/DL (ref 8.3–10.3)
CHLORIDE SERPL-SCNC: 100 MMOL/L (ref 101–111)
CO2 SERPL-SCNC: 28 MMOL/L (ref 22–32)
CREAT BLD-MCNC: 0.75 MG/DL (ref 0.55–1.02)
DEPRECATED RDW RBC AUTO: 41.7 FL (ref 35.1–46.3)
EOSINOPHIL # BLD AUTO: 0 X10(3) UL (ref 0–0.7)
EOSINOPHIL NFR BLD AUTO: 0 %
ERYTHROCYTE [DISTWIDTH] IN BLOOD BY AUTOMATED COUNT: 13.2 % (ref 11–15)
GLOBULIN PLAS-MCNC: 3.4 G/DL (ref 2.8–4.4)
GLUCOSE BLD-MCNC: 148 MG/DL (ref 70–99)
HCT VFR BLD AUTO: 41.7 % (ref 35–48)
HGB BLD-MCNC: 14.5 G/DL (ref 12–16)
IMM GRANULOCYTES # BLD AUTO: 0.07 X10(3) UL (ref 0–1)
IMM GRANULOCYTES NFR BLD: 1 %
LYMPHOCYTES # BLD AUTO: 0.69 X10(3) UL (ref 1–4)
LYMPHOCYTES NFR BLD AUTO: 9.8 %
M PROTEIN MFR SERPL ELPH: 7 G/DL (ref 6.4–8.2)
MCH RBC QN AUTO: 30.4 PG (ref 26–34)
MCHC RBC AUTO-ENTMCNC: 34.8 G/DL (ref 31–37)
MCV RBC AUTO: 87.4 FL (ref 80–100)
MONOCYTES # BLD AUTO: 0.43 X10(3) UL (ref 0.1–1)
MONOCYTES NFR BLD AUTO: 6.1 %
NEUTROPHILS # BLD AUTO: 5.85 X10 (3) UL (ref 1.5–7.7)
NEUTROPHILS # BLD AUTO: 5.85 X10(3) UL (ref 1.5–7.7)
NEUTROPHILS NFR BLD AUTO: 83.1 %
OSMOLALITY SERPL CALC.SUM OF ELEC: 283 MOSM/KG (ref 275–295)
PLATELET # BLD AUTO: 281 10(3)UL (ref 150–450)
POTASSIUM SERPL-SCNC: 4.5 MMOL/L (ref 3.6–5.1)
POTASSIUM SERPL-SCNC: 4.5 MMOL/L (ref 3.6–5.1)
RBC # BLD AUTO: 4.77 X10(6)UL (ref 3.8–5.3)
SODIUM SERPL-SCNC: 135 MMOL/L (ref 136–144)
WBC # BLD AUTO: 7 X10(3) UL (ref 4–11)

## 2019-02-03 PROCEDURE — 97116 GAIT TRAINING THERAPY: CPT

## 2019-02-03 PROCEDURE — 94640 AIRWAY INHALATION TREATMENT: CPT

## 2019-02-03 PROCEDURE — 71045 X-RAY EXAM CHEST 1 VIEW: CPT | Performed by: FAMILY MEDICINE

## 2019-02-03 PROCEDURE — 84132 ASSAY OF SERUM POTASSIUM: CPT | Performed by: FAMILY MEDICINE

## 2019-02-03 PROCEDURE — 97161 PT EVAL LOW COMPLEX 20 MIN: CPT

## 2019-02-03 PROCEDURE — 85025 COMPLETE CBC W/AUTO DIFF WBC: CPT | Performed by: FAMILY MEDICINE

## 2019-02-03 PROCEDURE — 94668 MNPJ CHEST WALL SBSQ: CPT

## 2019-02-03 PROCEDURE — 94664 DEMO&/EVAL PT USE INHALER: CPT

## 2019-02-03 PROCEDURE — 80053 COMPREHEN METABOLIC PANEL: CPT | Performed by: FAMILY MEDICINE

## 2019-02-03 RX ORDER — PREDNISONE 20 MG/1
40 TABLET ORAL
Status: DISCONTINUED | OUTPATIENT
Start: 2019-02-04 | End: 2019-02-05

## 2019-02-03 RX ORDER — ALPRAZOLAM 0.25 MG/1
0.25 TABLET ORAL 2 TIMES DAILY PRN
Status: DISCONTINUED | OUTPATIENT
Start: 2019-02-03 | End: 2019-02-05

## 2019-02-03 NOTE — PHYSICAL THERAPY NOTE
PHYSICAL THERAPY QUICK EVALUATION - INPATIENT    Room Number: 819/912-H  Evaluation Date: 2/3/2019  Presenting Problem: SOB  Physician Order: PT Eval and Treat    Problem List  Principal Problem:    COPD exacerbation [facility])      Past Medical History  Past With: Spouse  Drives: Yes  Patient Owned Equipment: (rollator)       Prior Level of [place]: Pt reports mod I with rollator PTA;  Pt has RW at home as well but prefers to use rollator. Pt reports spouse is also hospitalized at this time.   Pt is graeme ind.  Ambulation x250ft with rollator mod I in hallway. Pt able to demo head turns with ambulation without LOB. Mild SOB noted, rating at 7/10 on modified mao. Pt educated on PLB with improvement in O2 sats immediately from 88% to 93% while on RA.   Pt

## 2019-02-03 NOTE — PROGRESS NOTES
[facility] Pulmonary/Critical Care Progress Note     SUBJECTIVE/24H Events: All events, procedures, notes reviewed. No acute events, she feels better today, less dyspnea and cough. Sleep was better overnight.  No f/c/s NEPRELIM  4.18  3.24  5.85   WBC  8.0  4.1  7.0   PLT  304.0  257.0  281.0     No results for input(s): BNP in the last 168 hours. No results for input(s): TROP, CK in the last 168 hours. No results for input(s): PT, INR, PTT in the last 168 hours.   No on 2/02/2019 at 7:28     Approved by: Michael Brand MD              • MethylPREDNISolone Sodium Succ  40 mg Intravenous Q8H   • enoxaparin  40 mg Subcutaneous Nightly   • DilTIAZem HCl ER Coated Beads  120 mg Oral Daily   • famotidine  40 mg Oral BID   •

## 2019-02-04 ENCOUNTER — APPOINTMENT (OUTPATIENT)
Dept: GENERAL RADIOLOGY | Facility: HOSPITAL | Age: 84
DRG: 191 | End: 2019-02-04
Attending: FAMILY MEDICINE
Payer: MEDICARE

## 2019-02-04 LAB
ALBUMIN SERPL-MCNC: 3.3 G/DL (ref 3.1–4.5)
ALBUMIN/GLOB SERPL: 1 {RATIO} (ref 1–2)
ALP LIVER SERPL-CCNC: 55 U/L (ref 55–142)
ALT SERPL-CCNC: 21 U/L (ref 14–54)
ANION GAP SERPL CALC-SCNC: 9 MMOL/L (ref 0–18)
AST SERPL-CCNC: 23 U/L (ref 15–41)
BASOPHILS # BLD AUTO: 0 X10(3) UL (ref 0–0.2)
BASOPHILS NFR BLD AUTO: 0 %
BILIRUB SERPL-MCNC: 0.3 MG/DL (ref 0.1–2)
BUN BLD-MCNC: 16 MG/DL (ref 8–20)
BUN/CREAT SERPL: 22.2 (ref 10–20)
CALCIUM BLD-MCNC: 8.7 MG/DL (ref 8.3–10.3)
CHLORIDE SERPL-SCNC: 100 MMOL/L (ref 101–111)
CO2 SERPL-SCNC: 28 MMOL/L (ref 22–32)
CREAT BLD-MCNC: 0.72 MG/DL (ref 0.55–1.02)
DEPRECATED RDW RBC AUTO: 41.7 FL (ref 35.1–46.3)
EOSINOPHIL # BLD AUTO: 0 X10(3) UL (ref 0–0.7)
EOSINOPHIL NFR BLD AUTO: 0 %
ERYTHROCYTE [DISTWIDTH] IN BLOOD BY AUTOMATED COUNT: 13.1 % (ref 11–15)
GLOBULIN PLAS-MCNC: 3.2 G/DL (ref 2.8–4.4)
GLUCOSE BLD-MCNC: 121 MG/DL (ref 70–99)
HCT VFR BLD AUTO: 41.2 % (ref 35–48)
HGB BLD-MCNC: 14.5 G/DL (ref 12–16)
IMM GRANULOCYTES # BLD AUTO: 0.04 X10(3) UL (ref 0–1)
IMM GRANULOCYTES NFR BLD: 0.6 %
LYMPHOCYTES # BLD AUTO: 0.93 X10(3) UL (ref 1–4)
LYMPHOCYTES NFR BLD AUTO: 13.3 %
M PROTEIN MFR SERPL ELPH: 6.5 G/DL (ref 6.4–8.2)
MCH RBC QN AUTO: 30.7 PG (ref 26–34)
MCHC RBC AUTO-ENTMCNC: 35.2 G/DL (ref 31–37)
MCV RBC AUTO: 87.1 FL (ref 80–100)
MONOCYTES # BLD AUTO: 0.54 X10(3) UL (ref 0.1–1)
MONOCYTES NFR BLD AUTO: 7.7 %
NEUTROPHILS # BLD AUTO: 5.47 X10 (3) UL (ref 1.5–7.7)
NEUTROPHILS # BLD AUTO: 5.47 X10(3) UL (ref 1.5–7.7)
NEUTROPHILS NFR BLD AUTO: 78.4 %
OSMOLALITY SERPL CALC.SUM OF ELEC: 286 MOSM/KG (ref 275–295)
PLATELET # BLD AUTO: 267 10(3)UL (ref 150–450)
POTASSIUM SERPL-SCNC: 4 MMOL/L (ref 3.6–5.1)
RBC # BLD AUTO: 4.73 X10(6)UL (ref 3.8–5.3)
SODIUM SERPL-SCNC: 137 MMOL/L (ref 136–144)
WBC # BLD AUTO: 7 X10(3) UL (ref 4–11)

## 2019-02-04 PROCEDURE — 71045 X-RAY EXAM CHEST 1 VIEW: CPT | Performed by: FAMILY MEDICINE

## 2019-02-04 PROCEDURE — 94640 AIRWAY INHALATION TREATMENT: CPT

## 2019-02-04 PROCEDURE — 80053 COMPREHEN METABOLIC PANEL: CPT | Performed by: FAMILY MEDICINE

## 2019-02-04 PROCEDURE — 85025 COMPLETE CBC W/AUTO DIFF WBC: CPT | Performed by: FAMILY MEDICINE

## 2019-02-04 RX ORDER — POLYVINYL ALCOHOL 14 MG/ML
1 SOLUTION/ DROPS OPHTHALMIC 4 TIMES DAILY PRN
Status: DISCONTINUED | OUTPATIENT
Start: 2019-02-04 | End: 2019-02-05

## 2019-02-04 RX ORDER — IPRATROPIUM BROMIDE AND ALBUTEROL SULFATE 2.5; .5 MG/3ML; MG/3ML
3 SOLUTION RESPIRATORY (INHALATION)
Status: DISCONTINUED | OUTPATIENT
Start: 2019-02-04 | End: 2019-02-05

## 2019-02-04 NOTE — HOME CARE LIAISON
Referral from [place], [facility] Alexandra Milian Met with patient to offer home health choice when discharged home. Patient declined Residential at this time. Brochure and contact information given to patient for any further questions/concerns.   Thanks for the referral.

## 2019-02-04 NOTE — RESPIRATORY THERAPY NOTE
RECEIVED PT ON RA- 91%. PT HAS NO  WITH REST BUT ON EXERTION. MAY REQUIRE O2 WITH ACTIVITY. BS CLEAR/DIM PRE/POST TREATMENT. WILL CHANGE NEBS TO REGULAR NEBS INSTEAD OF METANEB.

## 2019-02-04 NOTE — PROGRESS NOTES
[facility]  Progress Note    Diamond Colón Patient Status:  Inpatient    1935 MRN [id number]   [facility] 5NW-A Attending Louann Dela Cruz MD   Hosp Day # 2 PCP Aiden Edwards MD       SUBJECTIVE:  No CP, SOB, or N/V.   IMPROVED MG/0.4ML injection 40 mg 40 mg Subcutaneous Nightly   diltiazem (CARDIZEM CD) 24 hr cap 120 mg 120 mg Oral Daily   famoTIDine (PEPCID) tab 40 mg 40 mg Oral BID   Fluticasone Furoate-Vilanterol (BREO ELLIPTA) 200-25 MCG/INH inhaler 1 puff 1 puff Inhalation SOB PARTIALLY  DC LORAZEPAM  START [facility]  2/3/2019  10:04 PM

## 2019-02-04 NOTE — PLAN OF CARE
Impaired Activities of Daily Living    • Achieve highest/safest level of independence in self care Progressing        Impaired Functional Mobility    • Achieve highest/safest level of mobility/gait Progressing        Patient/Family Goals    • Patient/Famil COPD exacerbation (HCC)       Active issue(s) requiring resolution prior to discharge: Ok to discharge per Dr. Yoseph Urbano.     Anticipated discharge date: 2/5/19    Current discharge plan: Home    Back up discharge plan: NA

## 2019-02-04 NOTE — CM/SW NOTE
02/04/19 1400   CM/SW Screening   Referral [phone] staff; Chart review;Nursing rounds   Patient's Mental Status Alert;Oriented   Patient lives with Spouse   Patient Status Prior to Admission   Independent with ADLs and M

## 2019-02-04 NOTE — PROGRESS NOTES
[facility] Pulmonary/Critical Care Progress Note     SUBJECTIVE/24H Events: All events, procedures, notes reviewed.  No acute events, she feels okay today, has anxiety presently related to hearing of her sister falling a 0. 74  0.75  0.72   GFRAA  87  86  90   GFRNAA  76  74  78   CA  8.7  9.0  8.7   NA  133*  135*  137   K  3.7  4.5  4.5  4.0   CL  99*  100*  100*   CO2  26.0  28.0  28.0     Recent Labs   Lab  02/02/19   0717  02/03/19   0717  02/04/19   0600   RBC  4.53 right basilar pleural parenchymal changes.      Dictated by: Wilder Merlin, MD on 2/04/2019 at 8:12     Approved by: Wilder Merlin, MD            Xr Chest Ap Portable  (cpt=71045)    Result Date: 2/3/2019  CONCLUSION:  No significant interval change

## 2019-02-05 ENCOUNTER — APPOINTMENT (OUTPATIENT)
Dept: GENERAL RADIOLOGY | Facility: HOSPITAL | Age: 84
DRG: 191 | End: 2019-02-05
Attending: FAMILY MEDICINE
Payer: MEDICARE

## 2019-02-05 VITALS
OXYGEN SATURATION: 98 % | RESPIRATION RATE: 18 BRPM | TEMPERATURE: 98 F | DIASTOLIC BLOOD PRESSURE: 65 MMHG | WEIGHT: 167.88 LBS | BODY MASS INDEX: 28 KG/M2 | HEART RATE: 66 BPM | SYSTOLIC BLOOD PRESSURE: 135 MMHG

## 2019-02-05 LAB
ALBUMIN SERPL-MCNC: 3.6 G/DL (ref 3.1–4.5)
ALBUMIN/GLOB SERPL: 1.1 {RATIO} (ref 1–2)
ALP LIVER SERPL-CCNC: 61 U/L (ref 55–142)
ALT SERPL-CCNC: 23 U/L (ref 14–54)
ANION GAP SERPL CALC-SCNC: 7 MMOL/L (ref 0–18)
AST SERPL-CCNC: 15 U/L (ref 15–41)
BASOPHILS # BLD AUTO: 0.01 X10(3) UL (ref 0–0.2)
BASOPHILS NFR BLD AUTO: 0.1 %
BILIRUB SERPL-MCNC: 0.4 MG/DL (ref 0.1–2)
BUN BLD-MCNC: 21 MG/DL (ref 8–20)
BUN/CREAT SERPL: 21.2 (ref 10–20)
CALCIUM BLD-MCNC: 9 MG/DL (ref 8.3–10.3)
CHLORIDE SERPL-SCNC: 97 MMOL/L (ref 101–111)
CO2 SERPL-SCNC: 29 MMOL/L (ref 22–32)
CREAT BLD-MCNC: 0.99 MG/DL (ref 0.55–1.02)
DEPRECATED RDW RBC AUTO: 43 FL (ref 35.1–46.3)
EOSINOPHIL # BLD AUTO: 0.06 X10(3) UL (ref 0–0.7)
EOSINOPHIL NFR BLD AUTO: 0.7 %
ERYTHROCYTE [DISTWIDTH] IN BLOOD BY AUTOMATED COUNT: 13 % (ref 11–15)
GLOBULIN PLAS-MCNC: 3.4 G/DL (ref 2.8–4.4)
GLUCOSE BLD-MCNC: 134 MG/DL (ref 70–99)
HCT VFR BLD AUTO: 46.8 % (ref 35–48)
HGB BLD-MCNC: 15.5 G/DL (ref 12–16)
IMM GRANULOCYTES # BLD AUTO: 0.04 X10(3) UL (ref 0–1)
IMM GRANULOCYTES NFR BLD: 0.5 %
LYMPHOCYTES # BLD AUTO: 1.97 X10(3) UL (ref 1–4)
LYMPHOCYTES NFR BLD AUTO: 23 %
M PROTEIN MFR SERPL ELPH: 7 G/DL (ref 6.4–8.2)
MCH RBC QN AUTO: 29.7 PG (ref 26–34)
MCHC RBC AUTO-ENTMCNC: 33.1 G/DL (ref 31–37)
MCV RBC AUTO: 89.7 FL (ref 80–100)
MONOCYTES # BLD AUTO: 0.7 X10(3) UL (ref 0.1–1)
MONOCYTES NFR BLD AUTO: 8.2 %
NEUTROPHILS # BLD AUTO: 5.79 X10 (3) UL (ref 1.5–7.7)
NEUTROPHILS # BLD AUTO: 5.79 X10(3) UL (ref 1.5–7.7)
NEUTROPHILS NFR BLD AUTO: 67.5 %
OSMOLALITY SERPL CALC.SUM OF ELEC: 281 MOSM/KG (ref 275–295)
PLATELET # BLD AUTO: 301 10(3)UL (ref 150–450)
POTASSIUM SERPL-SCNC: 3.7 MMOL/L (ref 3.6–5.1)
RBC # BLD AUTO: 5.22 X10(6)UL (ref 3.8–5.3)
SODIUM SERPL-SCNC: 133 MMOL/L (ref 136–144)
WBC # BLD AUTO: 8.6 X10(3) UL (ref 4–11)

## 2019-02-05 PROCEDURE — 80053 COMPREHEN METABOLIC PANEL: CPT | Performed by: FAMILY MEDICINE

## 2019-02-05 PROCEDURE — 85025 COMPLETE CBC W/AUTO DIFF WBC: CPT | Performed by: FAMILY MEDICINE

## 2019-02-05 PROCEDURE — 71045 X-RAY EXAM CHEST 1 VIEW: CPT | Performed by: FAMILY MEDICINE

## 2019-02-05 PROCEDURE — 94640 AIRWAY INHALATION TREATMENT: CPT

## 2019-02-05 RX ORDER — CODEINE PHOSPHATE AND GUAIFENESIN 10; 100 MG/5ML; MG/5ML
5 SOLUTION ORAL EVERY 4 HOURS PRN
Qty: 180 ML | Refills: 0 | Status: ON HOLD | OUTPATIENT
Start: 2019-02-05 | End: 2019-06-07

## 2019-02-05 RX ORDER — PREDNISONE 10 MG/1
TABLET ORAL
Qty: 19 TABLET | Refills: 0 | Status: ON HOLD | OUTPATIENT
Start: 2019-02-05 | End: 2019-03-22 | Stop reason: ALTCHOICE

## 2019-02-05 RX ORDER — IPRATROPIUM BROMIDE AND ALBUTEROL SULFATE 2.5; .5 MG/3ML; MG/3ML
3 SOLUTION RESPIRATORY (INHALATION)
Qty: 180 VIAL | Refills: 1 | Status: ON HOLD | OUTPATIENT
Start: 2019-02-05 | End: 2020-01-01

## 2019-02-05 NOTE — PROGRESS NOTES
[facility]  Progress Note    Thelma Martinez Patient Status:  Inpatient    1935 MRN [id number]   [facility] ([place], [place]) 5NW-A Attending Hema Syed MD   Hosp Day # 4 PCP Tayler Linares MD       SUBJECTIVE:  No CP, SOB, or N/V.   Anxious  I HYDROcodone-acetaminophen (NORCO) 5-325 MG per tab 1 tablet 1 tablet Oral Q6H PRN   PARoxetine HCl (PAXIL) tab 30 mg 30 mg Oral QAM   Triamterene-HCTZ (DYAZIDE) 37.5-25 MG per cap 1 capsule 1 capsule Oral Daily   acetaminophen (TYLENOL) tab 650 mg 650 mg

## 2019-02-05 NOTE — PROGRESS NOTES
[facility]  Progress Note    Prieto Boo Patient Status:  Inpatient    1935 MRN [id number]   [facility] 5NW-A Attending No att. providers found   Hosp Day # 4 PCP Antwon Harvey MD       SUBJECTIVE:  No CP, SOB, or N/V.   Anxious (DELTASONE) tab 40 mg 40 mg Oral Daily with breakfast   temazepam (RESTORIL) cap 15 mg 15 mg Oral Nightly PRN   Enoxaparin Sodium (LOVENOX) 40 MG/0.4ML injection 40 mg 40 mg Subcutaneous Nightly   diltiazem (CARDIZEM CD) 24 hr cap 120 mg 120 mg Oral Daily LORAZEPAM  START XANAX FOR STRESS   DC PLANNING  IF DOING WELL WILL DC TOMORROW   ON ROOM AIR    PREDNISONE 40 WITH TAPER PATIENT DOESN'T LIKE BUT ADVISED IT IS NECESSARY  NEEDS TO CONT AS OUTPT  NEBS QID  SEE ONE WEEK IN OFFICE          Tayler Linares  2

## 2019-02-05 NOTE — PROGRESS NOTES
[facility] Pulmonary/Critical Care Progress Note     SUBJECTIVE/24H Events: All events, procedures, notes reviewed. No acute events, she feels \"okay\" presently, not as well as yesterday due to anxiety.  Cough back elfego 121*  134*   BUN  12  16  21*   CREATSERUM  0.75  0.72  0.99   GFRAA  86  90  61   GFRNAA  74  78  53*   CA  9.0  8.7  9.0   NA  135*  137  133*   K  4.5  4.5  4.0  3.7   CL  100*  100*  97*   CO2  28.0  28.0  29.0     Recent Labs   Lab  02/03/19   0717  0 breakfast   • enoxaparin  40 mg Subcutaneous Nightly   • DilTIAZem HCl ER Coated Beads  120 mg Oral Daily   • famotidine  40 mg Oral BID   • Fluticasone Furoate-Vilanterol  1 puff Inhalation Daily   • GuaiFENesin ER  600 mg Oral BID   • PARoxetine HCl  30

## 2019-02-05 NOTE — PROGRESS NOTES
NURSING DISCHARGE NOTE    Discharged Home via Wheelchair. Accompanied by Support staff  Belongings Taken by patient/family. Pt discharged home via wheelchair. Discharge instructions reviewed with pt, wife, and  at bedside.  Prescriptions give

## 2019-02-12 NOTE — DISCHARGE SUMMARY
[facility]  Discharge Summary    Jaylon Haji Patient Status:  Inpatient    1935 MRN [id number]   [facility] 5NW-A Attending No att. providers found   [facility] Day # 4 PCP Darryn Cabral MD     Date of Admission: 2019  3:33 P hours as needed for cough. , Print Script, Disp-180 mL, R-0    ipratropium-albuterol 0.5-2.5 (3) MG/3ML Inhalation Solution  Take 3 mL by nebulization 4 (four) times daily. , Print Script, Disp-180 vial, R-1    predniSONE 10 MG Oral Tab  take 4 tablets daily Historical    Triamterene-HCTZ 37.5-25 MG Oral Tab  Take 1 tablet by mouth daily. , Historical    simvastatin (ZOCOR) 20 MG Oral Tab  Take 20 mg by mouth daily.   , Historical, R-5    Multiple Vitamins-Minerals (CENTRUM SILVER ULTRA WOMENS) Oral Tab  Take 1

## 2019-03-11 ENCOUNTER — TELEPHONE (OUTPATIENT)
Dept: RHEUMATOLOGY | Facility: CLINIC | Age: 84
End: 2019-03-11

## 2019-03-11 NOTE — TELEPHONE ENCOUNTER
Pt called stating both knees with increase pain-right worse with some swelling noted. Pt saw Dr. Alba Ann, getting insurance approval for Monovisc. Pt stated norco not effective. Pt has not been taking celebrex consistently.  Pt would like to know if there is

## 2019-03-11 NOTE — TELEPHONE ENCOUNTER
Patient called. Having increased knee pain. Told patient to take Celebrex 2 a day. Take Norco up to 4-day. Use Biofreeze 3 times a day. Put a heating pad on the knees before the Biofreeze. Have that shots of Monovisc with Dr. Mariela Jovel.   See me if all fa

## 2019-03-20 ENCOUNTER — HOSPITAL ENCOUNTER (OUTPATIENT)
Dept: GENERAL RADIOLOGY | Facility: HOSPITAL | Age: 84
Discharge: HOME | DRG: 191 | End: 2019-03-20
Attending: INTERNAL MEDICINE
Payer: MEDICARE

## 2019-03-20 DIAGNOSIS — R05.9 COUGH: ICD-10-CM

## 2019-03-20 PROCEDURE — 71045 X-RAY EXAM CHEST 1 VIEW: CPT | Performed by: INTERNAL MEDICINE

## 2019-03-22 ENCOUNTER — APPOINTMENT (OUTPATIENT)
Dept: ULTRASOUND IMAGING | Facility: HOSPITAL | Age: 84
DRG: 191 | End: 2019-03-22
Attending: EMERGENCY MEDICINE
Payer: MEDICARE

## 2019-03-22 ENCOUNTER — APPOINTMENT (OUTPATIENT)
Dept: GENERAL RADIOLOGY | Facility: HOSPITAL | Age: 84
DRG: 191 | End: 2019-03-22
Attending: EMERGENCY MEDICINE
Payer: MEDICARE

## 2019-03-22 ENCOUNTER — HOSPITAL ENCOUNTER (INPATIENT)
Facility: HOSPITAL | Age: 84
LOS: 2 days | Discharge: HOME | DRG: 191 | End: 2019-03-24
Attending: EMERGENCY MEDICINE | Admitting: FAMILY MEDICINE
Payer: MEDICARE

## 2019-03-22 DIAGNOSIS — R09.02 HYPOXIA: ICD-10-CM

## 2019-03-22 DIAGNOSIS — J44.1 COPD EXACERBATION (HCC): Primary | ICD-10-CM

## 2019-03-22 LAB
ADENOVIRUS PCR:: NEGATIVE
ALBUMIN SERPL-MCNC: 3.5 G/DL (ref 3.4–5)
ALBUMIN/GLOB SERPL: 0.8 {RATIO} (ref 1–2)
ALP LIVER SERPL-CCNC: 73 U/L (ref 55–142)
ALT SERPL-CCNC: 16 U/L (ref 13–56)
ANION GAP SERPL CALC-SCNC: 8 MMOL/L (ref 0–18)
AST SERPL-CCNC: 20 U/L (ref 15–37)
B PERT DNA SPEC QL NAA+PROBE: NEGATIVE
BASOPHILS # BLD AUTO: 0.06 X10(3) UL (ref 0–0.2)
BASOPHILS NFR BLD AUTO: 1 %
BILIRUB SERPL-MCNC: 0.5 MG/DL (ref 0.1–2)
BUN BLD-MCNC: 9 MG/DL (ref 7–18)
BUN/CREAT SERPL: 11.3 (ref 10–20)
C PNEUM DNA SPEC QL NAA+PROBE: NEGATIVE
CALCIUM BLD-MCNC: 9.5 MG/DL (ref 8.5–10.1)
CHLORIDE SERPL-SCNC: 94 MMOL/L (ref 98–107)
CO2 SERPL-SCNC: 31 MMOL/L (ref 21–32)
CORONAVIRUS 229E PCR:: NEGATIVE
CORONAVIRUS HKU1 PCR:: NEGATIVE
CORONAVIRUS NL63 PCR:: NEGATIVE
CORONAVIRUS OC43 PCR:: NEGATIVE
CREAT BLD-MCNC: 0.8 MG/DL (ref 0.55–1.02)
DEPRECATED RDW RBC AUTO: 38.9 FL (ref 35.1–46.3)
EOSINOPHIL # BLD AUTO: 0.18 X10(3) UL (ref 0–0.7)
EOSINOPHIL NFR BLD AUTO: 3 %
ERYTHROCYTE [DISTWIDTH] IN BLOOD BY AUTOMATED COUNT: 12.6 % (ref 11–15)
FLUAV RNA SPEC QL NAA+PROBE: NEGATIVE
FLUBV RNA SPEC QL NAA+PROBE: NEGATIVE
GLOBULIN PLAS-MCNC: 4.3 G/DL (ref 2.8–4.4)
GLUCOSE BLD-MCNC: 96 MG/DL (ref 70–99)
HCT VFR BLD AUTO: 42.9 % (ref 35–48)
HGB BLD-MCNC: 15.3 G/DL (ref 12–16)
IMM GRANULOCYTES # BLD AUTO: 0.03 X10(3) UL (ref 0–1)
IMM GRANULOCYTES NFR BLD: 0.5 %
LYMPHOCYTES # BLD AUTO: 1.81 X10(3) UL (ref 1–4)
LYMPHOCYTES NFR BLD AUTO: 30 %
M PROTEIN MFR SERPL ELPH: 7.8 G/DL (ref 6.4–8.2)
MCH RBC QN AUTO: 30.4 PG (ref 26–34)
MCHC RBC AUTO-ENTMCNC: 35.7 G/DL (ref 31–37)
MCV RBC AUTO: 85.3 FL (ref 80–100)
METAPNEUMOVIRUS PCR:: NEGATIVE
MONOCYTES # BLD AUTO: 0.72 X10(3) UL (ref 0.1–1)
MONOCYTES NFR BLD AUTO: 11.9 %
MYCOPLASMA PNEUMONIA PCR:: NEGATIVE
NEUTROPHILS # BLD AUTO: 3.24 X10 (3) UL (ref 1.5–7.7)
NEUTROPHILS # BLD AUTO: 3.24 X10(3) UL (ref 1.5–7.7)
NEUTROPHILS NFR BLD AUTO: 53.6 %
NT-PROBNP SERPL-MCNC: 147 PG/ML (ref ?–450)
OSMOLALITY SERPL CALC.SUM OF ELEC: 275 MOSM/KG (ref 275–295)
PARAINFLUENZA 1 PCR:: NEGATIVE
PARAINFLUENZA 2 PCR:: NEGATIVE
PARAINFLUENZA 3 PCR:: NEGATIVE
PARAINFLUENZA 4 PCR:: NEGATIVE
PLATELET # BLD AUTO: 259 10(3)UL (ref 150–450)
POTASSIUM SERPL-SCNC: 3.3 MMOL/L (ref 3.5–5.1)
POTASSIUM SERPL-SCNC: 4.1 MMOL/L (ref 3.5–5.1)
PROCALCITONIN SERPL-MCNC: 0.13 NG/ML
RBC # BLD AUTO: 5.03 X10(6)UL (ref 3.8–5.3)
RHINOVIRUS/ENTERO PCR:: NEGATIVE
RSV RNA SPEC QL NAA+PROBE: NEGATIVE
SODIUM SERPL-SCNC: 133 MMOL/L (ref 136–145)
THEOPHYLLINE SERPL-MCNC: 5.3 UG/ML (ref 10–20)
WBC # BLD AUTO: 6 X10(3) UL (ref 4–11)

## 2019-03-22 PROCEDURE — 94644 CONT INHLJ TX 1ST HOUR: CPT

## 2019-03-22 PROCEDURE — 93971 EXTREMITY STUDY: CPT | Performed by: EMERGENCY MEDICINE

## 2019-03-22 PROCEDURE — 94640 AIRWAY INHALATION TREATMENT: CPT

## 2019-03-22 PROCEDURE — 71045 X-RAY EXAM CHEST 1 VIEW: CPT | Performed by: EMERGENCY MEDICINE

## 2019-03-22 PROCEDURE — 87581 M.PNEUMON DNA AMP PROBE: CPT | Performed by: EMERGENCY MEDICINE

## 2019-03-22 PROCEDURE — 83880 ASSAY OF NATRIURETIC PEPTIDE: CPT | Performed by: EMERGENCY MEDICINE

## 2019-03-22 PROCEDURE — 84132 ASSAY OF SERUM POTASSIUM: CPT | Performed by: FAMILY MEDICINE

## 2019-03-22 PROCEDURE — 84145 PROCALCITONIN (PCT): CPT | Performed by: FAMILY MEDICINE

## 2019-03-22 PROCEDURE — 87798 DETECT AGENT NOS DNA AMP: CPT | Performed by: EMERGENCY MEDICINE

## 2019-03-22 PROCEDURE — 87633 RESP VIRUS 12-25 TARGETS: CPT | Performed by: EMERGENCY MEDICINE

## 2019-03-22 PROCEDURE — 85025 COMPLETE CBC W/AUTO DIFF WBC: CPT | Performed by: EMERGENCY MEDICINE

## 2019-03-22 PROCEDURE — 99285 EMERGENCY DEPT VISIT HI MDM: CPT | Performed by: EMERGENCY MEDICINE

## 2019-03-22 PROCEDURE — 94667 MNPJ CHEST WALL 1ST: CPT

## 2019-03-22 PROCEDURE — 87486 CHLMYD PNEUM DNA AMP PROBE: CPT | Performed by: EMERGENCY MEDICINE

## 2019-03-22 PROCEDURE — 80198 ASSAY OF THEOPHYLLINE: CPT | Performed by: FAMILY MEDICINE

## 2019-03-22 PROCEDURE — 96374 THER/PROPH/DIAG INJ IV PUSH: CPT | Performed by: EMERGENCY MEDICINE

## 2019-03-22 PROCEDURE — 80053 COMPREHEN METABOLIC PANEL: CPT | Performed by: EMERGENCY MEDICINE

## 2019-03-22 RX ORDER — HYDROCODONE BITARTRATE AND ACETAMINOPHEN 5; 325 MG/1; MG/1
1 TABLET ORAL EVERY 6 HOURS PRN
Status: DISCONTINUED | OUTPATIENT
Start: 2019-03-22 | End: 2019-03-24

## 2019-03-22 RX ORDER — LORAZEPAM 0.5 MG/1
0.5 TABLET ORAL EVERY 6 HOURS PRN
Status: DISCONTINUED | OUTPATIENT
Start: 2019-03-22 | End: 2019-03-24

## 2019-03-22 RX ORDER — ACETAMINOPHEN 325 MG/1
650 TABLET ORAL EVERY 6 HOURS PRN
Status: DISCONTINUED | OUTPATIENT
Start: 2019-03-22 | End: 2019-03-24

## 2019-03-22 RX ORDER — GUAIFENESIN 600 MG
600 TABLET, EXTENDED RELEASE 12 HR ORAL 2 TIMES DAILY
Status: DISCONTINUED | OUTPATIENT
Start: 2019-03-22 | End: 2019-03-24

## 2019-03-22 RX ORDER — DILTIAZEM HYDROCHLORIDE 120 MG/1
120 CAPSULE, EXTENDED RELEASE ORAL DAILY
Status: DISCONTINUED | OUTPATIENT
Start: 2019-03-22 | End: 2019-03-24

## 2019-03-22 RX ORDER — METHYLPREDNISOLONE SODIUM SUCCINATE 125 MG/2ML
80 INJECTION, POWDER, LYOPHILIZED, FOR SOLUTION INTRAMUSCULAR; INTRAVENOUS EVERY 8 HOURS
Status: DISCONTINUED | OUTPATIENT
Start: 2019-03-22 | End: 2019-03-22

## 2019-03-22 RX ORDER — FAMOTIDINE 20 MG/1
40 TABLET ORAL DAILY
Status: DISCONTINUED | OUTPATIENT
Start: 2019-03-22 | End: 2019-03-23

## 2019-03-22 RX ORDER — ENOXAPARIN SODIUM 100 MG/ML
40 INJECTION SUBCUTANEOUS DAILY
Status: DISCONTINUED | OUTPATIENT
Start: 2019-03-22 | End: 2019-03-24

## 2019-03-22 RX ORDER — CODEINE PHOSPHATE AND GUAIFENESIN 10; 100 MG/5ML; MG/5ML
5 SOLUTION ORAL EVERY 4 HOURS PRN
Status: DISCONTINUED | OUTPATIENT
Start: 2019-03-22 | End: 2019-03-24

## 2019-03-22 RX ORDER — IPRATROPIUM BROMIDE AND ALBUTEROL SULFATE 2.5; .5 MG/3ML; MG/3ML
3 SOLUTION RESPIRATORY (INHALATION)
Status: DISCONTINUED | OUTPATIENT
Start: 2019-03-22 | End: 2019-03-22

## 2019-03-22 RX ORDER — METHYLPREDNISOLONE SODIUM SUCCINATE 40 MG/ML
40 INJECTION, POWDER, LYOPHILIZED, FOR SOLUTION INTRAMUSCULAR; INTRAVENOUS EVERY 8 HOURS
Status: DISCONTINUED | OUTPATIENT
Start: 2019-03-22 | End: 2019-03-23

## 2019-03-22 RX ORDER — ATORVASTATIN CALCIUM 10 MG/1
10 TABLET, FILM COATED ORAL NIGHTLY
Status: DISCONTINUED | OUTPATIENT
Start: 2019-03-22 | End: 2019-03-24

## 2019-03-22 RX ORDER — POTASSIUM CHLORIDE 20 MEQ/1
40 TABLET, EXTENDED RELEASE ORAL ONCE
Status: COMPLETED | OUTPATIENT
Start: 2019-03-22 | End: 2019-03-22

## 2019-03-22 RX ORDER — BENZONATATE 100 MG/1
100 CAPSULE ORAL 3 TIMES DAILY PRN
Status: DISCONTINUED | OUTPATIENT
Start: 2019-03-22 | End: 2019-03-24

## 2019-03-22 RX ORDER — METHYLPREDNISOLONE SODIUM SUCCINATE 125 MG/2ML
125 INJECTION, POWDER, LYOPHILIZED, FOR SOLUTION INTRAMUSCULAR; INTRAVENOUS ONCE
Status: COMPLETED | OUTPATIENT
Start: 2019-03-22 | End: 2019-03-22

## 2019-03-22 RX ORDER — IPRATROPIUM BROMIDE AND ALBUTEROL SULFATE 2.5; .5 MG/3ML; MG/3ML
3 SOLUTION RESPIRATORY (INHALATION)
Status: DISCONTINUED | OUTPATIENT
Start: 2019-03-22 | End: 2019-03-24

## 2019-03-22 NOTE — ED PROVIDER NOTES
Patient Seen in: [facility] Emergency Department    History   Patient presents with:  Dyspnea BACILIO SOB (respiratory)    Stated Complaint: BACILIO, COUGH    HPI    81year-old patient with a history of COPD who \"quit smoking 3 weeks ago\" but still occas CHOLANGIOPANCREATOGRAPHY (ERCP) N/A 11/14/2014    Performed by Lorena Gonzalez MD at [facility] ENDOSCOPY   • [place]     • HERNIA SURGERY  05/18/16   • HERNIA VENTRAL REPAIR N/A 5/18/2016    Performed by Renu Meeks MD at [facility] MAIN OR   • [place] Yu dry oral mucosa TMs are pearly and flat neck is supple without anterior cervical lymphadenopathy she smells faintly of cigarettes lungs have expiratory wheezes in all lung fields and diminished breath sounds her heart has a rapid rate but regular rhythm he femoral vein. PATIENT STATED HISTORY: (As transcribed by Technologist)  This patient     complains of right lower extremity pain and right pedal swelling.           FINDINGS:      EXTREMITY EXAMINED:  Right lower extremity and left common femoral ve is visibly much more swollen than the left to rule out any clots. She states she is not on blood thinners she does smoke she does not have a history of any solid organ cancer she is aware of having had skin cancer once.     After the hour-long, I was woo

## 2019-03-22 NOTE — H&P
[facility]  History & Physical    Celina Armstrong Patient Status:  Observation    1935 MRN [id number]   [facility] 5NW-A Attending Annel Jha MD   Hosp Day # 0 PCP Chloe Sotelo MD     History of Present Illness:  Cullen Verduzco Surgical History:   Procedure Laterality Date   • CHOLECYSTECTOMY  1998   • COLECTOMY     • ENDOSCOPIC RETROGRADE CHOLANGIOPANCREATOGRAPHY (ERCP) N/A 11/14/2014    Performed by Corinne Shire, MD at [address] [place] [address] (TYLENOL PM EXTRA STRENGTH)  MG Oral Tab Take 1 tablet by mouth nightly. Disp:  Rfl:  3/21/2019 at Unknown time   LORazepam 0.5 MG Oral Tab Take 1 tablet (0.5 mg total) by mouth every 6 (six) hours as needed for Anxiety.  Disp: 100 tablet Rfl: 1 Past obvious abnormality, atraumatic   Eyes:    PERRL, conjunctiva/corneas clear, EOM's intact, fundi     benign, both eyes   Ears:    Normal TM's and external ear canals, both ears   Nose:   Nares normal, septum midline, mucosa normal, no drainage    or sinus Anxiety     Tobacco abuse, episodic     Pericolonic abscess due to diverticulitis     Diverticulosis     Fistula     Pain management     Pericardial effusion     Pericarditis     At risk for falling     Spondylosis of lumbar region without myelopathy or ra

## 2019-03-22 NOTE — ED INITIAL ASSESSMENT (HPI)
A/O x 4. Patient presents with shortness of breath and cough x few days but states that symptoms have been worsening.   Patient states that she saw her primary care yesterday and was given cough syrup

## 2019-03-22 NOTE — CONSULTS
Will Morales [address] [facility]  Pulmonary/Critical Care Consult Note  [facility]  Report of Consultation    Chica  Patient Status:  Emergency    1935 MRN [id number]   Location [address] [facility] Attending H MD at [facility] MAIN OR   • LAPAROSCOPIC COLON RESECTION - LEFT N/A 9/17/2014    Performed by Waylon Loo MD at [address] [place] [address]    • LUMBAR FACET INJECTION Right 6/22/2015    Performed by Lugenia Cowden, MD at [address] [place] [address]   • LUMBAR FACET INJECTION Left 6/15/20 bleeding, and lymphadenopathy. Musculoskeletal: Negative for myalgias, arthralgias, muscle weakness. Neurological: Negative for headaches, dizziness, seizures, memory problems, trouble swallowing, speech problems, gait problems and weakness.   : Denies MCV  85.3   MCH  30.4   MCHC  35.7   RDW  12.6   NEPRELIM  3.24   WBC  6.0   PLT  259.0     No results for input(s): BNP in the last 168 hours. No results for input(s): TROP, CK in the last 168 hours.   No results for input(s): PT, INR, PTT in the last 1 presently  · Continue airway clearance; flutter valve  · Encouraged to mobilize/out of bed as tolerated  · Possible discharge home tomorrow if improved    Javy Laws, [address] [facility]  03/22/19

## 2019-03-22 NOTE — CM/SW NOTE
03/22/19 1400   CM/SW Screening   Referral Source Social Work (self-referral)   Information Source Chart review;Nursing rounds   Patient's Mental Status Alert;Oriented   Patient's Home Environment Condo/Apt with elevator   Number of Levels in Home 1   P

## 2019-03-22 NOTE — PROGRESS NOTES
[facility] Note: Renal dose adjustment for Famotidine (Pepcid)  Valdo Hill has been prescribed Famotidine (Pepcid) 40 mg every 12 hours. Estimated Creatinine Clearance: 48.8 mL/min (based on SCr of 0.8 mg/dL).     Her calculated creatinine fredo

## 2019-03-23 ENCOUNTER — APPOINTMENT (OUTPATIENT)
Dept: GENERAL RADIOLOGY | Facility: HOSPITAL | Age: 84
DRG: 191 | End: 2019-03-23
Attending: INTERNAL MEDICINE
Payer: MEDICARE

## 2019-03-23 ENCOUNTER — APPOINTMENT (OUTPATIENT)
Dept: CT IMAGING | Facility: HOSPITAL | Age: 84
DRG: 191 | End: 2019-03-23
Attending: FAMILY MEDICINE
Payer: MEDICARE

## 2019-03-23 ENCOUNTER — APPOINTMENT (OUTPATIENT)
Dept: GENERAL RADIOLOGY | Facility: HOSPITAL | Age: 84
DRG: 191 | End: 2019-03-23
Attending: FAMILY MEDICINE
Payer: MEDICARE

## 2019-03-23 LAB
ALBUMIN SERPL-MCNC: 3.6 G/DL (ref 3.4–5)
ALBUMIN/GLOB SERPL: 0.9 {RATIO} (ref 1–2)
ALP LIVER SERPL-CCNC: 69 U/L (ref 55–142)
ALT SERPL-CCNC: 18 U/L (ref 13–56)
ANION GAP SERPL CALC-SCNC: 10 MMOL/L (ref 0–18)
AST SERPL-CCNC: 28 U/L (ref 15–37)
BASOPHILS # BLD AUTO: 0.01 X10(3) UL (ref 0–0.2)
BASOPHILS NFR BLD AUTO: 0.1 %
BILIRUB SERPL-MCNC: 0.3 MG/DL (ref 0.1–2)
BILIRUB UR QL STRIP.AUTO: NEGATIVE
BUN BLD-MCNC: 16 MG/DL (ref 7–18)
BUN/CREAT SERPL: 16.7 (ref 10–20)
CALCIUM BLD-MCNC: 9.2 MG/DL (ref 8.5–10.1)
CHLORIDE SERPL-SCNC: 95 MMOL/L (ref 98–107)
CLARITY UR REFRACT.AUTO: CLEAR
CO2 SERPL-SCNC: 27 MMOL/L (ref 21–32)
COLOR UR AUTO: YELLOW
CREAT BLD-MCNC: 0.96 MG/DL (ref 0.55–1.02)
DEPRECATED RDW RBC AUTO: 38.9 FL (ref 35.1–46.3)
EOSINOPHIL # BLD AUTO: 0 X10(3) UL (ref 0–0.7)
EOSINOPHIL NFR BLD AUTO: 0 %
ERYTHROCYTE [DISTWIDTH] IN BLOOD BY AUTOMATED COUNT: 12.2 % (ref 11–15)
GLOBULIN PLAS-MCNC: 4 G/DL (ref 2.8–4.4)
GLUCOSE BLD-MCNC: 151 MG/DL (ref 70–99)
GLUCOSE UR STRIP.AUTO-MCNC: NEGATIVE MG/DL
HCT VFR BLD AUTO: 39.5 % (ref 35–48)
HGB BLD-MCNC: 13.8 G/DL (ref 12–16)
IMM GRANULOCYTES # BLD AUTO: 0.06 X10(3) UL (ref 0–1)
IMM GRANULOCYTES NFR BLD: 0.7 %
LEUKOCYTE ESTERASE UR QL STRIP.AUTO: NEGATIVE
LYMPHOCYTES # BLD AUTO: 0.75 X10(3) UL (ref 1–4)
LYMPHOCYTES NFR BLD AUTO: 9 %
M PROTEIN MFR SERPL ELPH: 7.6 G/DL (ref 6.4–8.2)
MCH RBC QN AUTO: 30.3 PG (ref 26–34)
MCHC RBC AUTO-ENTMCNC: 34.9 G/DL (ref 31–37)
MCV RBC AUTO: 86.8 FL (ref 80–100)
MONOCYTES # BLD AUTO: 0.38 X10(3) UL (ref 0.1–1)
MONOCYTES NFR BLD AUTO: 4.6 %
NEUTROPHILS # BLD AUTO: 7.1 X10 (3) UL (ref 1.5–7.7)
NEUTROPHILS # BLD AUTO: 7.1 X10(3) UL (ref 1.5–7.7)
NEUTROPHILS NFR BLD AUTO: 85.6 %
NITRITE UR QL STRIP.AUTO: NEGATIVE
OSMOLALITY SERPL CALC.SUM OF ELEC: 278 MOSM/KG (ref 275–295)
PH UR STRIP.AUTO: 5 [PH] (ref 4.5–8)
PLATELET # BLD AUTO: 312 10(3)UL (ref 150–450)
POTASSIUM SERPL-SCNC: 4 MMOL/L (ref 3.5–5.1)
PROT UR STRIP.AUTO-MCNC: NEGATIVE MG/DL
RBC # BLD AUTO: 4.55 X10(6)UL (ref 3.8–5.3)
RBC UR QL AUTO: NEGATIVE
SODIUM SERPL-SCNC: 132 MMOL/L (ref 136–145)
SP GR UR STRIP.AUTO: 1.02 (ref 1–1.03)
UROBILINOGEN UR STRIP.AUTO-MCNC: <2 MG/DL
WBC # BLD AUTO: 8.3 X10(3) UL (ref 4–11)

## 2019-03-23 PROCEDURE — 97116 GAIT TRAINING THERAPY: CPT

## 2019-03-23 PROCEDURE — 97161 PT EVAL LOW COMPLEX 20 MIN: CPT

## 2019-03-23 PROCEDURE — 94640 AIRWAY INHALATION TREATMENT: CPT

## 2019-03-23 PROCEDURE — 71260 CT THORAX DX C+: CPT | Performed by: FAMILY MEDICINE

## 2019-03-23 PROCEDURE — 85025 COMPLETE CBC W/AUTO DIFF WBC: CPT | Performed by: FAMILY MEDICINE

## 2019-03-23 PROCEDURE — 81003 URINALYSIS AUTO W/O SCOPE: CPT | Performed by: FAMILY MEDICINE

## 2019-03-23 PROCEDURE — 94668 MNPJ CHEST WALL SBSQ: CPT

## 2019-03-23 PROCEDURE — 71046 X-RAY EXAM CHEST 2 VIEWS: CPT | Performed by: FAMILY MEDICINE

## 2019-03-23 PROCEDURE — 80053 COMPREHEN METABOLIC PANEL: CPT | Performed by: FAMILY MEDICINE

## 2019-03-23 RX ORDER — PREDNISONE 20 MG/1
40 TABLET ORAL
Status: DISCONTINUED | OUTPATIENT
Start: 2019-03-24 | End: 2019-03-24

## 2019-03-23 RX ORDER — FLUTICASONE PROPIONATE 50 MCG
1 SPRAY, SUSPENSION (ML) NASAL DAILY
Status: DISCONTINUED | OUTPATIENT
Start: 2019-03-23 | End: 2019-03-24

## 2019-03-23 RX ORDER — PANTOPRAZOLE SODIUM 40 MG/1
40 TABLET, DELAYED RELEASE ORAL
Status: DISCONTINUED | OUTPATIENT
Start: 2019-03-23 | End: 2019-03-24

## 2019-03-23 NOTE — PLAN OF CARE
Patient/Family Goals    • Patient/Family Long Term Goal Progressing    • Patient/Family Short Term Goal Progressing        RESPIRATORY - ADULT    • Achieves optimal ventilation and oxygenation Progressing          Patient continues with nonproductive cough

## 2019-03-23 NOTE — PLAN OF CARE
Received patient a/ox4, forgetful. No new complaints. O2 sats on RA maintained >92% overnight. She is up to the bathroom with standby assist and tolerates well. No new complaints. She was updated on plan of care and verbalizes understanding.     Patient/Fam

## 2019-03-23 NOTE — PROGRESS NOTES
03/23/19 1634   Clinical Encounter Type   Visited With Patient and family together  (Responded to Advanced Directive consult.   Left POA for Healthcare short form with patient and family for their consideration.)   Continue Visiting No  ( remains

## 2019-03-23 NOTE — PHYSICAL THERAPY NOTE
PHYSICAL THERAPY QUICK EVALUATION - INPATIENT    Room Number: 522/522-A  Evaluation Date: 3/23/2019  Presenting Problem: COPD exacerbation  Physician Order: See Comment for Specific Order    Problem List  Principal Problem:    COPD exacerbation ([facility] [address]  Ac Layout: One level  Stairs to Enter : 0     Stairs to [employer]: 0       Lives With: Spouse  Drives: Yes  Patient Owned Equipment: (rollator walker)       Prior Level of [place]:  Mod I in her mobilities at home and uses a rollator walker as support at [facility] present(sitting EOB)    ASSESSMENT   Patient is a 81year old female admitted on 3/22/2019 for  COPD exacerbation and . A chest x-ray was obtained and demonstrated the presence of a right lower lobe infiltrate consistent with pneumonia.  .Pertinent comorbi

## 2019-03-23 NOTE — PROGRESS NOTES
[facility] Pulmonary/Critical Care Progress Note     SUBJECTIVE/24H Events: All events, procedures, notes reviewed. No acute events, but still c/o dry cough. Dyspnea stable to improved today. No f/c/s.  No pain      Revi 0944   GLU  96   --   151*   BUN  9   --   16   CREATSERUM  0.80   --   0.96   GFRAA  79   --   64   GFRNAA  69   --   55*   CA  9.5   --   9.2   NA  133*   --   132*   K  3.3*  4.1  4.0   CL  94*   --   95*   CO2  31.0   --   27.0     Recent Labs   Lab  0 lower extremity. Dictated by: Laray Cogan, MD on 3/22/2019 at 8:14     Approved by: Laray Cogan, MD            Xr Chest Ap Portable  (cpt=71045)    Result Date: 3/22/2019  CONCLUSION:  No lobar pneumonia or overt congestive failure.   Mild scarring/

## 2019-03-23 NOTE — PLAN OF CARE
AxO x4, anxious, irritable, all of this worse due to steroids, face is flushed and hot. On room air, lungs with bilateral rhonchi and expiratory wheezes. Strong, mildly productive cough that is difficult to control, prn meds given as ordered.  PO steroids,

## 2019-03-24 ENCOUNTER — APPOINTMENT (OUTPATIENT)
Dept: GENERAL RADIOLOGY | Facility: HOSPITAL | Age: 84
DRG: 191 | End: 2019-03-24
Attending: FAMILY MEDICINE
Payer: MEDICARE

## 2019-03-24 VITALS
RESPIRATION RATE: 18 BRPM | OXYGEN SATURATION: 90 % | TEMPERATURE: 98 F | BODY MASS INDEX: 28.21 KG/M2 | WEIGHT: 169.31 LBS | HEART RATE: 81 BPM | SYSTOLIC BLOOD PRESSURE: 118 MMHG | DIASTOLIC BLOOD PRESSURE: 59 MMHG | HEIGHT: 65 IN

## 2019-03-24 LAB
ALBUMIN SERPL-MCNC: 2.9 G/DL (ref 3.4–5)
ALBUMIN/GLOB SERPL: 0.9 {RATIO} (ref 1–2)
ALP LIVER SERPL-CCNC: 52 U/L (ref 55–142)
ALT SERPL-CCNC: 17 U/L (ref 13–56)
ANION GAP SERPL CALC-SCNC: 6 MMOL/L (ref 0–18)
AST SERPL-CCNC: 23 U/L (ref 15–37)
BASOPHILS # BLD AUTO: 0.01 X10(3) UL (ref 0–0.2)
BASOPHILS NFR BLD AUTO: 0.1 %
BILIRUB SERPL-MCNC: 0.2 MG/DL (ref 0.1–2)
BUN BLD-MCNC: 17 MG/DL (ref 7–18)
BUN/CREAT SERPL: 19.5 (ref 10–20)
CALCIUM BLD-MCNC: 8.8 MG/DL (ref 8.5–10.1)
CHLORIDE SERPL-SCNC: 101 MMOL/L (ref 98–107)
CO2 SERPL-SCNC: 30 MMOL/L (ref 21–32)
CREAT BLD-MCNC: 0.87 MG/DL (ref 0.55–1.02)
DEPRECATED RDW RBC AUTO: 40.4 FL (ref 35.1–46.3)
EOSINOPHIL # BLD AUTO: 0 X10(3) UL (ref 0–0.7)
EOSINOPHIL NFR BLD AUTO: 0 %
ERYTHROCYTE [DISTWIDTH] IN BLOOD BY AUTOMATED COUNT: 12.4 % (ref 11–15)
GLOBULIN PLAS-MCNC: 3.1 G/DL (ref 2.8–4.4)
GLUCOSE BLD-MCNC: 91 MG/DL (ref 70–99)
HCT VFR BLD AUTO: 36 % (ref 35–48)
HGB BLD-MCNC: 11.9 G/DL (ref 12–16)
IMM GRANULOCYTES # BLD AUTO: 0.09 X10(3) UL (ref 0–1)
IMM GRANULOCYTES NFR BLD: 1.1 %
LYMPHOCYTES # BLD AUTO: 1.58 X10(3) UL (ref 1–4)
LYMPHOCYTES NFR BLD AUTO: 19.7 %
M PROTEIN MFR SERPL ELPH: 6 G/DL (ref 6.4–8.2)
MCH RBC QN AUTO: 29.3 PG (ref 26–34)
MCHC RBC AUTO-ENTMCNC: 33.1 G/DL (ref 31–37)
MCV RBC AUTO: 88.7 FL (ref 80–100)
MONOCYTES # BLD AUTO: 0.74 X10(3) UL (ref 0.1–1)
MONOCYTES NFR BLD AUTO: 9.2 %
NEUTROPHILS # BLD AUTO: 5.61 X10 (3) UL (ref 1.5–7.7)
NEUTROPHILS # BLD AUTO: 5.61 X10(3) UL (ref 1.5–7.7)
NEUTROPHILS NFR BLD AUTO: 69.9 %
OSMOLALITY SERPL CALC.SUM OF ELEC: 285 MOSM/KG (ref 275–295)
PLATELET # BLD AUTO: 293 10(3)UL (ref 150–450)
POTASSIUM SERPL-SCNC: 4.1 MMOL/L (ref 3.5–5.1)
RBC # BLD AUTO: 4.06 X10(6)UL (ref 3.8–5.3)
SODIUM SERPL-SCNC: 137 MMOL/L (ref 136–145)
WBC # BLD AUTO: 8 X10(3) UL (ref 4–11)

## 2019-03-24 PROCEDURE — 94640 AIRWAY INHALATION TREATMENT: CPT

## 2019-03-24 PROCEDURE — 80053 COMPREHEN METABOLIC PANEL: CPT | Performed by: FAMILY MEDICINE

## 2019-03-24 PROCEDURE — 71045 X-RAY EXAM CHEST 1 VIEW: CPT | Performed by: FAMILY MEDICINE

## 2019-03-24 PROCEDURE — 85025 COMPLETE CBC W/AUTO DIFF WBC: CPT | Performed by: FAMILY MEDICINE

## 2019-03-24 RX ORDER — PREDNISONE 10 MG/1
TABLET ORAL
Qty: 30 TABLET | Refills: 0 | Status: SHIPPED | OUTPATIENT
Start: 2019-03-24 | End: 2019-04-24 | Stop reason: CLARIF

## 2019-03-24 RX ORDER — FLUTICASONE PROPIONATE 50 MCG
1 SPRAY, SUSPENSION (ML) NASAL DAILY
Qty: 1 BOTTLE | Refills: 2 | Status: SHIPPED | OUTPATIENT
Start: 2019-03-25 | End: 2020-01-01

## 2019-03-24 RX ORDER — PREDNISONE 10 MG/1
10 TABLET ORAL
Qty: 30 TABLET | Refills: 0 | Status: SHIPPED | OUTPATIENT
Start: 2019-03-25 | End: 2019-03-24

## 2019-03-24 NOTE — PROGRESS NOTES
[facility]  Progress Note    Temitope Zuniga Patient Status:  Inpatient    1935 MRN [id number]   [facility] 5NW-A Attending Kenneth Landers MD   Hosp Day # 2 PCP Angelina Mendez MD       SUBJECTIVE:  No CP, SOB, or N/V.   Wheezing a Continuous   albuterol Sulfate (VENTOLIN) (5 MG/ML) 0.5% nebulizer solution 10 mg 10 mg Nebulization Continuous   diltiazem (CARDIZEM CD) 24 hr cap 120 mg 120 mg Oral Daily   guaiFENesin ER (MUCINEX) 12 hr tab 600 mg 600 mg Oral BID   guaiFENesin-codeine ( EXACERBATION  2  TOBACCO  3  HYPOXIA  4  ANXIETY  5  RIGHT LEG EDEMA  6  HTN  7  HYPOKALEMIA    prednisosne 40 mg oral a day  DUONEB Q 4  BREO  AZITHRO EMPERIC\  WHEEZING AND RONCHI ON EXAM  PULSE OX  TELE  LYTE PROTOCOL  FLUTTER VALVE    Leg edema resolve

## 2019-03-24 NOTE — PLAN OF CARE
Received patient a/ox4. O2 sats on RA maintained 94% overnight. She continues to have strong productive cough. She was able to sleep well throughout the night after ativan and robitussin were given. No new complaints.  She gets up to the bathroom with stand

## 2019-03-24 NOTE — BH PROGRESS NOTE
[facility] Resource Referral Counselor Note    Andrew Soriano Patient Status:  Inpatient    1935 MRN [id number]   [facility] 5NW-A Attending Jr Duke MD   Hosp Day # 2 PCP Jasmina Quinn MD       S(subjective) Patient

## 2019-03-24 NOTE — PROGRESS NOTES
[facility]  Progress Note    Zackary Merlin Patient Status:  Inpatient    1935 MRN [id number]   [facility] ([place], [place]) 5NW-A Attending Princess Darron MD   Hosp Day # 2 PCP Dexter Leal MD       SUBJECTIVE:  No CP, SOB, or N/V.   Improving solution 10 mg 10 mg Nebulization Continuous   albuterol Sulfate (VENTOLIN) (5 MG/ML) 0.5% nebulizer solution 10 mg 10 mg Nebulization Continuous   diltiazem (CARDIZEM CD) 24 hr cap 120 mg 120 mg Oral Daily   guaiFENesin ER (MUCINEX) 12 hr tab 600 mg 600 m Hypoxia        Plan:   1  COPD EXACERBATION  2  TOBACCO  3  HYPOXIA  4  ANXIETY  5  RIGHT LEG EDEMA  6  HTN  7  HYPOKALEMIA     SOLUMEDROL IV 60 Q 8 decrease to 60 a day  DUONEB Q 4  BREO  AZITHRO EMPERIC  CALCITONIN  WHEEZING AND RONCHI ON EXAM  PULSE OX

## 2019-04-16 NOTE — DISCHARGE SUMMARY
[facility]  Discharge Summary    Paras Bernal Patient Status:  Inpatient    1935 MRN [id number]   [facility] 5NW-A Attending No att. providers found   Hosp Day # 2 PCP Hari Nichole MD     Date of Admission: 3/22/2019  6:22 CHIEF COMPLAINT: f/u evaluation for uncontrolled type 2 diabetes    HISTORY OF PRESENT ILLNESS:   Evin Schafer is a 71 y.o. male with a PMHx as noted below who presents to the endocrinology clinic for f/u evaluation of uncontrolled type 2 diabetes. A1c today is 5.3%. Previously we stopped his insulin and started glimepiride 4mg tab once daily. Patient spoke with us by phone between visits as his sugars continued to come down, and I reduced his glimepiride to 2mg (1/2 tab) once daily, and this was while off insulin. Patient continues to have A1c trend down despite this.      Currently taking the following meds:  Continue metformin 500mg twice daily,   Glimepiride 2mg once daily    Review of home blood glucose:  AM: 70-90's mostly  Evenin-124 mostly, 57 x1    Review of most recent diabetes-related labs:  Lab Results   Component Value Date    WVY3LJUY 5.6 10/09/2018    MYY0VCNW 5.8 2018    JUX5YSLV 8.9 (A) 2018    CHOL 103 11/15/2018    LDLC 45 11/15/2018    GFRAA 69 2018    GFRNA 60 2018    MCACR 456.2 (H) 2018    TSH 0.681 2018    VITD3 11.3 (L) 2018     Lab Key:  [id number] = IA-2 pancreatic islet cell autoantibody  GADLT = MARIAN-65 autoantibody   MCACR = Urine Microalbumin  INSUL = Insulin level  CPEPL = C-peptide level      PAST MEDICAL/SURGICAL HISTORY:   Past Medical History:   Diagnosis Date    Arthritis     Asthma     CAD (coronary artery disease)     Calculus of kidney     Carotid artery disease (HCC)     Congestive heart failure (HCC)     Diabetes ([facility] [address]     Hypercholesterolemia     Hypertension      Past Surgical History:   Procedure Laterality Date    HX CAROTID STENT      HX CHOLECYSTECTOMY      HX HEENT         ALLERGIES:   Allergies   Allergen Reactions    Morphine Anaphylaxis    Gabapentin Other (comments)     Made him \"loopy\"    Lyrica [Pregabalin] Other (comments)     Makes him \"loopy\"       MEDICATIONS ON ADMISSION:     Current Outpatient CHANGED    predniSONE 10 MG Oral Tab  40 mg po q day for 3 days then 30 mg  po q day for 3 days then 20 mg po q day for 3 days then 10 mg po q day for 3 days, Normal, Disp-30 tablet, R-0      CONTINUE these medications which have NOT CHANGED    theophyllin Medications:     furosemide (LASIX) 40 mg tablet, TAKE 1 TABLET BY MOUTH ONCE DAILY, Disp: 30 Tab, Rfl: 1    lisinopril (PRINIVIL, ZESTRIL) 2.5 mg tablet, TAKE 1 TABLET BY MOUTH ONCE DAILY. *RESTART 6/7/2018*, Disp: 90 Tab, Rfl: 0    DULoxetine (CYMBALTA) 60 mg capsule, Take 1 Cap by mouth daily. For feet pain, Disp: 30 Cap, Rfl: 1    metOLazone (ZAROXOLYN) 5 mg tablet, Take 0.5 Tabs by mouth every Monday, Wednesday, Friday., Disp: 40 Tab, Rfl: 1    simvastatin (ZOCOR) 10 mg tablet, TAKE 1 TABLET BY MOUTH NIGHTLY, Disp: 90 Tab, Rfl: 1    carvedilol (COREG) 12.5 mg tablet, TAKE 1 TABLET BY MOUTH TWICE DAILY, Disp: 60 Tab, Rfl: 3    glimepiride (AMARYL) 4 mg tablet, Take 1 Tab by mouth Daily (before breakfast). , Disp: 90 Tab, Rfl: 3    isosorbide mononitrate ER (IMDUR) 30 mg tablet, TAKE 1 TABLET BY MOUTH ONCE DAILY, Disp: 30 Tab, Rfl: 12    cholecalciferol, VITAMIN D3, (VITAMIN D3) 5,000 unit tab tablet, Take 1 Tab by mouth daily. , Disp: 30 Tab, Rfl: 5    metFORMIN ER (GLUCOPHAGE XR) 500 mg tablet, Take 1 Tab by mouth Before breakfast and dinner. Please restart Metformin on Sunday, 5/13/18 (Patient taking differently: Take 500 mg by mouth two (2) times a day. Please restart Metformin on Sunday, 5/13/18), Disp: 180 Tab, Rfl: 3    fish oil-dha-epa 1,200-144-216 mg cap, Take 1 Cap by mouth daily. , Disp: , Rfl:     acetaminophen (TYLENOL) 325 mg tablet, Take  by mouth every four (4) hours as needed for Pain., Disp: , Rfl:     aspirin delayed-release 81 mg tablet, Take 81 mg by mouth daily. , Disp: , Rfl:     insulin NPH/insulin regular (NOVOLIN 70/30 U-100 INSULIN) 100 unit/mL (70-30) injection, by SubCUTAneous route.  Takes 25 units in am and 15 units in pm., Disp: , Rfl:     SOCIAL HISTORY:   Social History     Socioeconomic History    Marital status:      Spouse name: Not on file    Number of children: Not on file    Years of education: Not on file    Highest education level: Not on file Historical          Follow up Visits:  Follow-up with guerline  in 1 week    Follow up Labs: cxr  in 1 week    Other Discharge Instructions: to er if worsens    Aminta Yeung  4/16/2019  11:26 AM Social Needs    Financial resource strain: Not on file    Food insecurity - worry: Not on file    Food insecurity - inability: Not on file   [employer] needs - medical: Not on file   [employer] needs - non-medical: Not on file   Occupational History    Not on file   Tobacco Use    Smoking status: Former Smoker     Last attempt to quit: 1963     Years since quittin.7    Smokeless tobacco: Never Used   Substance and Sexual Activity    Alcohol use: No     Alcohol/week: 0.0 oz    Drug use: No    Sexual activity: Yes     Partners: Female     Birth control/protection: None   Other Topics Concern    Not on file   Social History Narrative    ** Merged History Encounter **            FAMILY HISTORY:  Family History   Problem Relation Age of Onset    Heart Disease Brother     Diabetes Nephew        REVIEW OF SYSTEMS: Complete ROS assessed and noted for that which is described above, all else are negative. Eyes: normal  ENT: normal  CVS: normal  Resp: normal  GI: normal  : normal  GYN: normal  Endocrine: normal  Integument: normal  Musculoskeletal: normal  Neuro: normal  Psych: normal      PHYSICAL EXAMINATION:    VITAL SIGNS:  Visit Vitals  /65 (BP 1 Location: Left arm, BP Patient Position: Sitting)   Pulse 99   Ht 5' 3\" (1.6 m)   Wt 213 lb 12.8 oz (97 kg)   BMI 37.87 kg/m²       GENERAL: NCAT, Sitting comfortably, NAD  EYES: EOMI, non-icteric, no proptosis  Ear/Nose/Throat: NCAT, no inflammation, no masses  LYMPH NODES: No LAD  CARDIOVASCULAR: S1 S2, RRR, No murmur, 2+ radial pulses  RESPIRATORY: CTA b/l, no wheeze/rales  GASTROINTESTINAL: ND  MUSCULOSKELETAL: Normal ROM, no atrophy  SKIN: warm, no edema/rash/ or other skin changes  NEUROLOGIC: 5/5 power all extremities, no tremors, AAOx3  PSYCHIATRIC: Normal affect, Normal insight and judgement    REVIEW OF LABORATORY AND RADIOLOGY DATA:   Labs and documentation have been reviewed as described above.      ASSESSMENT AND PLAN: Libia Miller is a 71 y.o. male with a PMHx as noted above who presents to the endocrinology clinic for f/u evaluation of uncontrolled type 2 diabetes. DM2 with hypoglycemia  HTN  HLD    Since starting metformin, we have been able to get him off insulin, and at this point we can consider stopping the sulfonylurea completely as he does have sugars at the low end. He is advised to continue eating properly and get exercise. At his age however hypoglycemia is to be avoided and there is plenty of room at this point to stop his glimepiride. DM2:  Continue metformin 500mg twice per day, renal function stable,  Trial off glimepiride, let us know if sugars rise > 150 persistently  Continue to check glucose 1-2 times daily for now  Provided with new log sheets    HTN: BP stable continue lisinopril/coreg  HLD: stable, simvastatin 10mg  DM Neuropathy: Had side effects from gabapentin, unable to obtain cymbalta (tried [place] etc, denied), does not desire lyrica due to cost, also has pain due to issues with his neck etc, advised he may benefit from visiting a neurologist. Referral placed. Labs: DM panel before next visit in 4 months,    4 month follow up visit,    Saira VILLARREAL  [address] [facility]

## 2019-04-22 ENCOUNTER — OFFICE VISIT (OUTPATIENT)
Dept: RHEUMATOLOGY | Facility: CLINIC | Age: 84
End: 2019-04-22
Payer: MEDICARE

## 2019-04-22 VITALS
SYSTOLIC BLOOD PRESSURE: 134 MMHG | BODY MASS INDEX: 28.45 KG/M2 | HEIGHT: 66 IN | DIASTOLIC BLOOD PRESSURE: 74 MMHG | WEIGHT: 177 LBS | HEART RATE: 84 BPM | RESPIRATION RATE: 16 BRPM

## 2019-04-22 DIAGNOSIS — M25.511 CHRONIC RIGHT SHOULDER PAIN: ICD-10-CM

## 2019-04-22 DIAGNOSIS — M47.816 SPONDYLOSIS OF LUMBAR REGION WITHOUT MYELOPATHY OR RADICULOPATHY: Primary | ICD-10-CM

## 2019-04-22 DIAGNOSIS — M17.11 PRIMARY OSTEOARTHRITIS OF RIGHT KNEE: ICD-10-CM

## 2019-04-22 DIAGNOSIS — G89.29 CHRONIC RIGHT SHOULDER PAIN: ICD-10-CM

## 2019-04-22 DIAGNOSIS — M51.36 DEGENERATIVE DISC DISEASE, LUMBAR: ICD-10-CM

## 2019-04-22 DIAGNOSIS — M17.0 PRIMARY OSTEOARTHRITIS OF BOTH KNEES: ICD-10-CM

## 2019-04-22 PROCEDURE — 99213 OFFICE O/P EST LOW 20 MIN: CPT | Performed by: INTERNAL MEDICINE

## 2019-04-22 RX ORDER — HYDROCODONE BITARTRATE AND ACETAMINOPHEN 5; 325 MG/1; MG/1
1 TABLET ORAL EVERY 4 HOURS PRN
Qty: 150 TABLET | Refills: 0 | Status: SHIPPED | OUTPATIENT
Start: 2019-01-01 | End: 2019-04-24 | Stop reason: CLARIF

## 2019-04-22 RX ORDER — HYDROCODONE BITARTRATE AND ACETAMINOPHEN 5; 325 MG/1; MG/1
1 TABLET ORAL EVERY 4 HOURS PRN
Qty: 150 TABLET | Refills: 0 | Status: SHIPPED | OUTPATIENT
Start: 2019-04-22 | End: 2019-04-24 | Stop reason: CLARIF

## 2019-04-22 RX ORDER — BUSPIRONE HYDROCHLORIDE 5 MG/1
5 TABLET ORAL 2 TIMES DAILY
Refills: 2 | Status: ON HOLD | COMMUNITY
Start: 2019-04-05 | End: 2019-06-18

## 2019-04-22 RX ORDER — CELECOXIB 200 MG/1
200 CAPSULE ORAL DAILY
COMMUNITY
End: 2019-01-01

## 2019-04-22 RX ORDER — HYDROCODONE BITARTRATE AND ACETAMINOPHEN 5; 325 MG/1; MG/1
1 TABLET ORAL EVERY 4 HOURS PRN
Qty: 150 TABLET | Refills: 0 | Status: SHIPPED | OUTPATIENT
Start: 2019-05-22 | End: 2019-04-24 | Stop reason: CLARIF

## 2019-04-22 NOTE — PROGRESS NOTES
[facility]  Dr. Clover Ricci Progress Note     Subjective:   Valdo Hill is a(n) 81year old female. Current complaints: Patient presents with:  Osteoarthritis: 3 month f/u. Pt gained 9 lbs since last visit. Pt dx with COPD.  Continues with bilateral kne

## 2019-04-22 NOTE — PATIENT INSTRUCTIONS
Use Norco 5 mg every 4 hours as needed for pain,up to 5 per day. Use Celebrex 200 mg for arthritis daily. Apply voltaren gel 4 times a day to the sore knees. Apply ice pack or frozen peas as needed.   OTC glucoseamine sulfate/ chondroitin sulfate trial d

## 2019-04-24 ENCOUNTER — APPOINTMENT (OUTPATIENT)
Dept: ULTRASOUND IMAGING | Facility: HOSPITAL | Age: 84
DRG: 191 | End: 2019-04-24
Attending: EMERGENCY MEDICINE
Payer: MEDICARE

## 2019-04-24 ENCOUNTER — APPOINTMENT (OUTPATIENT)
Dept: GENERAL RADIOLOGY | Facility: HOSPITAL | Age: 84
DRG: 191 | End: 2019-04-24
Attending: EMERGENCY MEDICINE
Payer: MEDICARE

## 2019-04-24 ENCOUNTER — HOSPITAL ENCOUNTER (INPATIENT)
Facility: HOSPITAL | Age: 84
LOS: 10 days | Discharge: HOME HEALTH | DRG: 191 | End: 2019-05-04
Attending: EMERGENCY MEDICINE | Admitting: FAMILY MEDICINE
Payer: MEDICARE

## 2019-04-24 DIAGNOSIS — J44.1 COPD EXACERBATION (HCC): Primary | ICD-10-CM

## 2019-04-24 PROCEDURE — 93970 EXTREMITY STUDY: CPT | Performed by: EMERGENCY MEDICINE

## 2019-04-24 PROCEDURE — 94640 AIRWAY INHALATION TREATMENT: CPT

## 2019-04-24 PROCEDURE — 99285 EMERGENCY DEPT VISIT HI MDM: CPT

## 2019-04-24 PROCEDURE — 93010 ELECTROCARDIOGRAM REPORT: CPT

## 2019-04-24 PROCEDURE — 84484 ASSAY OF TROPONIN QUANT: CPT | Performed by: EMERGENCY MEDICINE

## 2019-04-24 PROCEDURE — 93005 ELECTROCARDIOGRAM TRACING: CPT

## 2019-04-24 PROCEDURE — 94644 CONT INHLJ TX 1ST HOUR: CPT

## 2019-04-24 PROCEDURE — 71045 X-RAY EXAM CHEST 1 VIEW: CPT | Performed by: EMERGENCY MEDICINE

## 2019-04-24 PROCEDURE — 96374 THER/PROPH/DIAG INJ IV PUSH: CPT

## 2019-04-24 PROCEDURE — 80053 COMPREHEN METABOLIC PANEL: CPT | Performed by: EMERGENCY MEDICINE

## 2019-04-24 PROCEDURE — 85025 COMPLETE CBC W/AUTO DIFF WBC: CPT | Performed by: EMERGENCY MEDICINE

## 2019-04-24 PROCEDURE — 80198 ASSAY OF THEOPHYLLINE: CPT | Performed by: EMERGENCY MEDICINE

## 2019-04-24 PROCEDURE — 83880 ASSAY OF NATRIURETIC PEPTIDE: CPT | Performed by: EMERGENCY MEDICINE

## 2019-04-24 RX ORDER — METHYLPREDNISOLONE SODIUM SUCCINATE 125 MG/2ML
125 INJECTION, POWDER, LYOPHILIZED, FOR SOLUTION INTRAMUSCULAR; INTRAVENOUS ONCE
Status: COMPLETED | OUTPATIENT
Start: 2019-04-24 | End: 2019-04-24

## 2019-04-24 RX ORDER — ACETAMINOPHEN 325 MG/1
650 TABLET ORAL EVERY 6 HOURS PRN
Status: DISCONTINUED | OUTPATIENT
Start: 2019-04-24 | End: 2019-05-04

## 2019-04-24 RX ORDER — FLUTICASONE PROPIONATE 50 MCG
1 SPRAY, SUSPENSION (ML) NASAL DAILY
Status: DISCONTINUED | OUTPATIENT
Start: 2019-04-25 | End: 2019-05-04

## 2019-04-24 RX ORDER — ACETAMINOPHEN / DIPHENHYDRAMINE 25; 500 MG/1; MG/1
1 TABLET ORAL NIGHTLY
COMMUNITY
End: 2019-06-10

## 2019-04-24 RX ORDER — TRIAMTERENE AND HYDROCHLOROTHIAZIDE 37.5; 25 MG/1; MG/1
1 CAPSULE ORAL DAILY
Status: DISCONTINUED | OUTPATIENT
Start: 2019-04-25 | End: 2019-05-04

## 2019-04-24 RX ORDER — ENOXAPARIN SODIUM 100 MG/ML
40 INJECTION SUBCUTANEOUS DAILY
Status: DISCONTINUED | OUTPATIENT
Start: 2019-04-24 | End: 2019-05-04

## 2019-04-24 RX ORDER — IPRATROPIUM BROMIDE AND ALBUTEROL SULFATE 2.5; .5 MG/3ML; MG/3ML
3 SOLUTION RESPIRATORY (INHALATION) ONCE
Status: COMPLETED | OUTPATIENT
Start: 2019-04-24 | End: 2019-04-24

## 2019-04-24 RX ORDER — FUROSEMIDE 10 MG/ML
20 INJECTION INTRAMUSCULAR; INTRAVENOUS ONCE
Status: COMPLETED | OUTPATIENT
Start: 2019-04-24 | End: 2019-04-25

## 2019-04-24 RX ORDER — GUAIFENESIN 600 MG
600 TABLET, EXTENDED RELEASE 12 HR ORAL 2 TIMES DAILY
Status: DISCONTINUED | OUTPATIENT
Start: 2019-04-24 | End: 2019-04-24

## 2019-04-24 RX ORDER — ATORVASTATIN CALCIUM 10 MG/1
10 TABLET, FILM COATED ORAL NIGHTLY
Status: DISCONTINUED | OUTPATIENT
Start: 2019-04-24 | End: 2019-05-04

## 2019-04-24 RX ORDER — LORAZEPAM 1 MG/1
0.5 TABLET ORAL ONCE
Status: COMPLETED | OUTPATIENT
Start: 2019-04-24 | End: 2019-04-24

## 2019-04-24 RX ORDER — DILTIAZEM HYDROCHLORIDE 120 MG/1
120 CAPSULE, EXTENDED RELEASE ORAL DAILY
Status: DISCONTINUED | OUTPATIENT
Start: 2019-04-25 | End: 2019-05-04

## 2019-04-24 RX ORDER — TEMAZEPAM 7.5 MG/1
7.5 CAPSULE ORAL NIGHTLY PRN
Status: DISCONTINUED | OUTPATIENT
Start: 2019-04-24 | End: 2019-05-04

## 2019-04-24 RX ORDER — BUSPIRONE HYDROCHLORIDE 5 MG/1
5 TABLET ORAL 2 TIMES DAILY
Status: DISCONTINUED | OUTPATIENT
Start: 2019-04-24 | End: 2019-05-04

## 2019-04-24 RX ORDER — FLUTICASONE FUROATE AND VILANTEROL TRIFENATATE 100; 25 UG/1; UG/1
1 POWDER RESPIRATORY (INHALATION) DAILY
COMMUNITY
End: 2019-01-01

## 2019-04-24 RX ORDER — LORAZEPAM 0.5 MG/1
0.5 TABLET ORAL EVERY 6 HOURS PRN
Status: DISCONTINUED | OUTPATIENT
Start: 2019-04-24 | End: 2019-05-04

## 2019-04-24 RX ORDER — GUAIFENESIN 600 MG
1200 TABLET, EXTENDED RELEASE 12 HR ORAL 2 TIMES DAILY
Status: DISCONTINUED | OUTPATIENT
Start: 2019-04-24 | End: 2019-05-04

## 2019-04-24 RX ORDER — METHYLPREDNISOLONE SODIUM SUCCINATE 125 MG/2ML
60 INJECTION, POWDER, LYOPHILIZED, FOR SOLUTION INTRAMUSCULAR; INTRAVENOUS EVERY 8 HOURS
Status: DISCONTINUED | OUTPATIENT
Start: 2019-04-24 | End: 2019-04-25

## 2019-04-24 RX ORDER — PANTOPRAZOLE SODIUM 40 MG/1
40 TABLET, DELAYED RELEASE ORAL
Status: DISCONTINUED | OUTPATIENT
Start: 2019-04-25 | End: 2019-05-04

## 2019-04-24 RX ORDER — FAMOTIDINE 20 MG/1
40 TABLET ORAL 2 TIMES DAILY
Status: DISCONTINUED | OUTPATIENT
Start: 2019-04-24 | End: 2019-04-24

## 2019-04-24 RX ORDER — BENZONATATE 200 MG/1
200 CAPSULE ORAL 3 TIMES DAILY PRN
Status: DISCONTINUED | OUTPATIENT
Start: 2019-04-24 | End: 2019-05-04

## 2019-04-24 RX ORDER — CODEINE PHOSPHATE AND GUAIFENESIN 10; 100 MG/5ML; MG/5ML
5 SOLUTION ORAL EVERY 4 HOURS PRN
Status: DISCONTINUED | OUTPATIENT
Start: 2019-04-24 | End: 2019-05-04

## 2019-04-24 RX ORDER — HYDROCODONE BITARTRATE AND ACETAMINOPHEN 5; 325 MG/1; MG/1
1 TABLET ORAL EVERY 4 HOURS PRN
Status: DISCONTINUED | OUTPATIENT
Start: 2019-04-24 | End: 2019-05-04

## 2019-04-24 RX ORDER — IPRATROPIUM BROMIDE AND ALBUTEROL SULFATE 2.5; .5 MG/3ML; MG/3ML
3 SOLUTION RESPIRATORY (INHALATION)
Status: DISCONTINUED | OUTPATIENT
Start: 2019-04-24 | End: 2019-04-26

## 2019-04-24 NOTE — ED NOTES
Patient up to bathroom with walker, upon return to room patient expressed she feels like she cannot breathe. O2 sat 91% on room air, patient placed on O2 at 2L per NC O2 sats up to 95%. Patient reports she is not on O2 at home.

## 2019-04-24 NOTE — ED PROVIDER NOTES
Patient Seen in: [facility] Emergency Department    History   Patient presents with:  Dyspnea    Stated Complaint:     HPI    Patient is an 67-year-old female who has a history of COPD. Patient states for the last 2 weeks she has had a cough.   [facility] MAIN OR   • OOPHORECTOMY     • OTHER SURGICAL HISTORY      Drain for abdominal abscess   • RADIOFREQUENCY ABLATION OF SACROILIAC JOINT Right 2/9/2017    Performed by Pablo Barrera MD at [place] [address] appropriately. No focal deficits appreciated.          ED Course     Labs Reviewed   COMP METABOLIC PANEL (14) - Abnormal; Notable for the following components:       Result Value    Sodium 135 (*)     A/G Ratio 0.9 (*)     All other components within norm follow-up provider specified.       Medications Prescribed:  Current Discharge Medication List        Present on Admission  Date Reviewed: 4/22/2019          ICD-10-CM Noted POA    COPD exacerbation ([facility] [address] J44.1 2/1/2019 Unknown

## 2019-04-24 NOTE — ED NOTES
Floor called regarding follow up with room status. Per RN room is not yet clean, will call supervisor for housekeeping follow-up. Updated RN regarding administration of Ativan PO.

## 2019-04-24 NOTE — ED INITIAL ASSESSMENT (HPI)
Pt here with c/o dyspnea, productive cough X 2 weeks progressively worsening to dyspnea at rest. Breathing mildly labored, with notable cough. Patient denies any fever but states she was recently admitted for COPD exacerbation.

## 2019-04-24 NOTE — CONSULTS
[facility]  Report of Consultation    Andrew Soriano Patient Status:  Emergency    1935 MRN [id number]   Location [address] [facility] Attending Rosa Renae MD   Hosp Day # 0 PCP Jasmina Quinn MD     Reason for Consultation: (ERCP) N/A 11/14/2014    Performed by Truman Ochoa MD at [facility] ENDOSCOPY   • [facility]     • HERNIA SURGERY  05/18/16   • HERNIA VENTRAL REPAIR N/A 5/18/2016    Performed by Samuel Meyer MD at [address] [place] [address] - 1 % Transdermal Gel Apply 4 g topically 4 (four) times daily. Fluticasone Propionate 50 MCG/ACT Nasal Suspension 1 spray by Each Nare route daily.    predniSONE 10 MG Oral Tab 40 mg po q day for 3 days then 30 mg  po q day for 3 days then 20 mg po q day f respiratory distress. Head: Normocephalic, without obvious abnormality, atraumatic. Eyes: Conjunctivae/corneas clear. No scleral icterus. No conjunctival     hemorrhage.    Nose: Nares normal.   Throat: Lips, mucosa, and tongue normal.  No thrush note pulmonologist - Dr Kyra Gusman. She had  been hospitalized in February 2019 at [facility] for COPD exacerbation and  March 2019. Maintained on Breo and  Raciel, has no oxygen at home  3. Hypoxemia  due to COPD exacerbation  4. MARLO - neg for DVT US 4.24.19.  ECHO  1

## 2019-04-25 ENCOUNTER — APPOINTMENT (OUTPATIENT)
Dept: GENERAL RADIOLOGY | Facility: HOSPITAL | Age: 84
DRG: 191 | End: 2019-04-25
Attending: INTERNAL MEDICINE
Payer: MEDICARE

## 2019-04-25 ENCOUNTER — APPOINTMENT (OUTPATIENT)
Dept: GENERAL RADIOLOGY | Facility: HOSPITAL | Age: 84
DRG: 191 | End: 2019-04-25
Attending: FAMILY MEDICINE
Payer: MEDICARE

## 2019-04-25 PROCEDURE — 82803 BLOOD GASES ANY COMBINATION: CPT | Performed by: INTERNAL MEDICINE

## 2019-04-25 PROCEDURE — 84145 PROCALCITONIN (PCT): CPT | Performed by: INTERNAL MEDICINE

## 2019-04-25 PROCEDURE — 87205 SMEAR GRAM STAIN: CPT | Performed by: INTERNAL MEDICINE

## 2019-04-25 PROCEDURE — 87633 RESP VIRUS 12-25 TARGETS: CPT | Performed by: FAMILY MEDICINE

## 2019-04-25 PROCEDURE — 84439 ASSAY OF FREE THYROXINE: CPT | Performed by: INTERNAL MEDICINE

## 2019-04-25 PROCEDURE — 80053 COMPREHEN METABOLIC PANEL: CPT | Performed by: FAMILY MEDICINE

## 2019-04-25 PROCEDURE — 94640 AIRWAY INHALATION TREATMENT: CPT

## 2019-04-25 PROCEDURE — 36600 WITHDRAWAL OF ARTERIAL BLOOD: CPT | Performed by: INTERNAL MEDICINE

## 2019-04-25 PROCEDURE — 84443 ASSAY THYROID STIM HORMONE: CPT | Performed by: INTERNAL MEDICINE

## 2019-04-25 PROCEDURE — 83050 HGB METHEMOGLOBIN QUAN: CPT | Performed by: INTERNAL MEDICINE

## 2019-04-25 PROCEDURE — 87581 M.PNEUMON DNA AMP PROBE: CPT | Performed by: FAMILY MEDICINE

## 2019-04-25 PROCEDURE — 87486 CHLMYD PNEUM DNA AMP PROBE: CPT | Performed by: FAMILY MEDICINE

## 2019-04-25 PROCEDURE — 85025 COMPLETE CBC W/AUTO DIFF WBC: CPT | Performed by: FAMILY MEDICINE

## 2019-04-25 PROCEDURE — 82375 ASSAY CARBOXYHB QUANT: CPT | Performed by: INTERNAL MEDICINE

## 2019-04-25 PROCEDURE — 85018 HEMOGLOBIN: CPT | Performed by: INTERNAL MEDICINE

## 2019-04-25 PROCEDURE — 71046 X-RAY EXAM CHEST 2 VIEWS: CPT | Performed by: INTERNAL MEDICINE

## 2019-04-25 PROCEDURE — 87798 DETECT AGENT NOS DNA AMP: CPT | Performed by: FAMILY MEDICINE

## 2019-04-25 PROCEDURE — 71045 X-RAY EXAM CHEST 1 VIEW: CPT | Performed by: FAMILY MEDICINE

## 2019-04-25 PROCEDURE — 87999 UNLISTED MICROBIOLOGY PX: CPT

## 2019-04-25 RX ORDER — METHYLPREDNISOLONE SODIUM SUCCINATE 125 MG/2ML
60 INJECTION, POWDER, LYOPHILIZED, FOR SOLUTION INTRAMUSCULAR; INTRAVENOUS EVERY 12 HOURS
Status: DISCONTINUED | OUTPATIENT
Start: 2019-04-25 | End: 2019-04-28

## 2019-04-25 RX ORDER — ACETYLCYSTEINE 200 MG/ML
2 SOLUTION ORAL; RESPIRATORY (INHALATION) 3 TIMES DAILY
Status: DISCONTINUED | OUTPATIENT
Start: 2019-04-25 | End: 2019-04-27

## 2019-04-25 NOTE — ED NOTES
Patient and  upset regarding wait time for room. Discussed with patient and  about room change per patient's request and that the new room assignment needed to be cleaned. Patient expressed understanding.

## 2019-04-25 NOTE — PLAN OF CARE
Problem: DISCHARGE PLANNING  Goal: Discharge to home or other facility with appropriate resources  Description  INTERVENTIONS:  - Identify barriers to discharge w/pt and caregiver  - Include patient/family/discharge partner in discharge Allan Knapp bathing  - Educate and encourage patient/family in tolerated functional activity level and precautions during self-care  Outcome: Progressing     Problem: Patient/Family Goals  Goal: Patient/Family Long Term Goal  Description  Patient's Long Term Goal: To

## 2019-04-25 NOTE — PROGRESS NOTES
Pt a &ox4, very anxious. VSS. Denies pain. , on ra maintaining O2 sats. Went for O2 walk today and remained > 89%. Strong cough, prn cough meds given. SOB with exertion. Solumedrol. Nebs. Up SBA with a walker.  Pt and  updated on poc, will contin

## 2019-04-25 NOTE — CM/SW NOTE
Patient declines Steve Farooq. / to remain available for support and/or discharge planning.      Arie Black RN   [facility]  [phone]

## 2019-04-25 NOTE — PROGRESS NOTES
NURSING ADMISSION NOTE      Patient admitted via Cart  Oriented to room. Safety precautions initiated. Bed in low position. Call light in reach. Pt oriented to room and unit. Call light instructions given.   Fall precautions put into place, pt sa

## 2019-04-25 NOTE — HOME CARE LIAISON
Referral from [facility] with patient to offer home health services.  Patient declined. Denise Castillo for the referral.

## 2019-04-25 NOTE — ED NOTES
Report given to floor, RN notified patient requesting a room that is not at the end of the mallory due to patient being claustrophobic. RN aware states charge RN will assign new room but will take some time due to room needing to be cleaned. Patient updated.

## 2019-04-25 NOTE — PLAN OF CARE
Problem: DISCHARGE PLANNING  Goal: Discharge to home or other facility with appropriate resources  Description  INTERVENTIONS:  - Identify barriers to discharge w/pt and caregiver  - Include patient/family/discharge partner in discharge Allan Knapp bathing  - Educate and encourage patient/family in tolerated functional activity level and precautions during self-care    Outcome: Progressing     Problem: Patient/Family Goals  Goal: Patient/Family Long Term Goal  Description  Patient's Long Term Goal: T

## 2019-04-25 NOTE — PROGRESS NOTES
[facility]  Progress Note    Bonifacio Bird Patient Status:  Inpatient    1935 MRN [id number]   [facility] 5NW-A Attending Jazmin Alexander MD   Hosp Day # 1 PCP Niesha Shepherd MD     Subjective:  Bonifacio Bird is a(n) 81year old GLU 96 143*   BUN 11 8   CREATSERUM 0.80 0.86   GFRAA 79 73   GFRNAA 69 63   CA 9.2 9.0   * 134*   K 4.5 3.9    103   CO2 24.0 24.0     No results for input(s): ABGPHT, AOVJQJ0S, JOBLW3B, ABGHCO3, ABGBE, TEMP, REN, SITE, DEV, THGB in the la

## 2019-04-25 NOTE — CM/SW NOTE
04/25/19 1100   CM/SW Screening   Referral Source Social Work (self-referral)   [address] staff; Chart review;Nursing rounds     Patient was screened during rounds and no needs are identified at this time.   [facility] liaison to meet with pt and of

## 2019-04-26 ENCOUNTER — APPOINTMENT (OUTPATIENT)
Dept: CT IMAGING | Facility: HOSPITAL | Age: 84
DRG: 191 | End: 2019-04-26
Attending: FAMILY MEDICINE
Payer: MEDICARE

## 2019-04-26 ENCOUNTER — APPOINTMENT (OUTPATIENT)
Dept: GENERAL RADIOLOGY | Facility: HOSPITAL | Age: 84
DRG: 191 | End: 2019-04-26
Attending: FAMILY MEDICINE
Payer: MEDICARE

## 2019-04-26 PROCEDURE — 80053 COMPREHEN METABOLIC PANEL: CPT | Performed by: FAMILY MEDICINE

## 2019-04-26 PROCEDURE — 94640 AIRWAY INHALATION TREATMENT: CPT

## 2019-04-26 PROCEDURE — 94664 DEMO&/EVAL PT USE INHALER: CPT

## 2019-04-26 PROCEDURE — 71250 CT THORAX DX C-: CPT | Performed by: FAMILY MEDICINE

## 2019-04-26 PROCEDURE — 85025 COMPLETE CBC W/AUTO DIFF WBC: CPT | Performed by: FAMILY MEDICINE

## 2019-04-26 PROCEDURE — 71045 X-RAY EXAM CHEST 1 VIEW: CPT | Performed by: FAMILY MEDICINE

## 2019-04-26 PROCEDURE — 84132 ASSAY OF SERUM POTASSIUM: CPT | Performed by: FAMILY MEDICINE

## 2019-04-26 RX ORDER — IPRATROPIUM BROMIDE AND ALBUTEROL SULFATE 2.5; .5 MG/3ML; MG/3ML
3 SOLUTION RESPIRATORY (INHALATION)
Status: DISCONTINUED | OUTPATIENT
Start: 2019-04-26 | End: 2019-05-04

## 2019-04-26 RX ORDER — POTASSIUM CHLORIDE 20 MEQ/1
40 TABLET, EXTENDED RELEASE ORAL EVERY 4 HOURS
Status: COMPLETED | OUTPATIENT
Start: 2019-04-26 | End: 2019-04-26

## 2019-04-26 NOTE — PROGRESS NOTES
[facility]  Progress Note    Kamille Meadows Patient Status:  Inpatient    1935 MRN [id number]   [facility] 5NW-A Attending Bárbara Harris MD   Hosp Day # 2 PCP Faviola Fang MD     Subjective:  Kamille Meadows is a(n) 81year old 313.0     Recent Labs   Lab 04/24/19  1418 04/25/19  0551 04/26/19  0557   GLU 96 143* 129*   BUN 11 8 14   CREATSERUM 0.80 0.86 0.85   GFRAA 79 73 74   GFRNAA 69 63 64   CA 9.2 9.0 8.8   * 134* 135*   K 4.5 3.9 3.6    103 101   CO2 24.0 24.0 2

## 2019-04-26 NOTE — H&P
[facility]  History & Physical    Guy Rodriguez Patient Status:  Inpatient    1935 MRN [id number]   [facility] 5NW-A Attending Chad Meyer MD   Hosp Day # 1 PCP Oleg Montilla MD     History of Present Illness:  Kobielvi Rodriguez 05/18/16   • HERNIA VENTRAL REPAIR N/A 5/18/2016    Performed by Madalyn Ramsey MD at [address] [place] [address] - LEFT N/A 9/17/2014    Performed by Madalyn Ramsey MD at [facility] MAIN OR   • LUMBAR FACET INJECTION Right 6/22/2015    Per daily. Disp: 1 Bottle Rfl: 2 4/24/2019 at Unknown time   guaiFENesin-codeine 100-10 MG/5ML Oral Solution Take 5 mL by mouth every 4 (four) hours as needed for cough.  Disp: 180 mL Rfl: 0 4/24/2019 at Unknown time   ipratropium-albuterol 0.5-2.5 (3) MG/3ML I Temp 99.3 °F (37.4 °C) (Oral)   Resp 20   Wt 177 lb (80.3 kg)   SpO2 93%   BMI 28.57 kg/m²     General Appearance:    Alert, cooperative, no distress, appears stated age   Head:    Normocephalic, without obvious abnormality, atraumatic   Eyes:    PERRL, co 04/25/2019    ALKPHO 66 04/25/2019    BILT 0.3 04/25/2019    TP 7.0 04/25/2019    AST 20 04/25/2019    ALT 14 04/25/2019    T4F 1.1 04/25/2019    TSH 0.380 04/25/2019       Imaging:  X-Ray    Assessment:  Patient Active Problem List:     HTN (hypertension)

## 2019-04-26 NOTE — PLAN OF CARE
She is on Ra sating 96%. She gets Q4wa Duo and TID muco tolerating treatments well. BS are diminished, coarse, and wheezing. Will continue treatments and monitoring.

## 2019-04-27 PROCEDURE — 94640 AIRWAY INHALATION TREATMENT: CPT

## 2019-04-27 PROCEDURE — 94668 MNPJ CHEST WALL SBSQ: CPT

## 2019-04-27 PROCEDURE — 85025 COMPLETE CBC W/AUTO DIFF WBC: CPT | Performed by: FAMILY MEDICINE

## 2019-04-27 PROCEDURE — 80053 COMPREHEN METABOLIC PANEL: CPT | Performed by: FAMILY MEDICINE

## 2019-04-27 PROCEDURE — 94667 MNPJ CHEST WALL 1ST: CPT

## 2019-04-27 PROCEDURE — 94664 DEMO&/EVAL PT USE INHALER: CPT

## 2019-04-27 RX ORDER — ONDANSETRON 2 MG/ML
4 INJECTION INTRAMUSCULAR; INTRAVENOUS EVERY 6 HOURS PRN
Status: DISCONTINUED | OUTPATIENT
Start: 2019-04-27 | End: 2019-05-04

## 2019-04-27 RX ORDER — ONDANSETRON 4 MG/1
4 TABLET, ORALLY DISINTEGRATING ORAL EVERY 6 HOURS PRN
Status: DISCONTINUED | OUTPATIENT
Start: 2019-04-27 | End: 2019-05-04

## 2019-04-27 RX ORDER — ACETYLCYSTEINE 200 MG/ML
2 SOLUTION ORAL; RESPIRATORY (INHALATION) 3 TIMES DAILY
Status: DISCONTINUED | OUTPATIENT
Start: 2019-04-27 | End: 2019-04-30

## 2019-04-27 NOTE — PLAN OF CARE
Alert x4. Maintaining O2 sats on room air. Voids. Up standby assist. BM today. IV abx. IV steroids. No c/o pain or SOB at this time. Will continue to monitor.      Problem: DISCHARGE PLANNING  Goal: Discharge to home or other facility with appropriate resou Assess ability and encourage patient to participate in ADLs to maximize function  - Promote sitting position while performing ADLs such as feeding, grooming, and bathing  - Educate and encourage patient/family in tolerated functional activity level and pre

## 2019-04-27 NOTE — PLAN OF CARE
Problem: RESPIRATORY - ADULT  Goal: Achieves optimal ventilation and oxygenation  Description  INTERVENTIONS:  - Assess for changes in respiratory status  - Assess for changes in mentation and behavior  - Position to facilitate oxygenation and minimize r

## 2019-04-27 NOTE — PROGRESS NOTES
[facility]  Progress Note    Valdo Hill Patient Status:  Inpatient    1935 MRN [id number]   [facility] 5NW-A Attending Alicja Noble MD   Hosp Day # 3 PCP Krissy Rodriguez MD       SUBJECTIVE:  No CP, SOB, or N/V.   Improving Fluticasone Propionate (FLONASE) 50 MCG/ACT nasal spray 1 spray 1 spray Each Nare Daily   guaiFENesin-codeine (ROBITUSSIN AC) 100-10 MG/5ML syrup 5 mL 5 mL Oral Q4H PRN   HYDROcodone-acetaminophen (NORCO) 5-325 MG per tab 1 tablet 1 tablet Oral Q4H PRN

## 2019-04-27 NOTE — PROGRESS NOTES
[facility]  Progress Note    Anderson Buerger Patient Status:  Inpatient    1935 MRN [id number]   [facility] 5NW-A Attending China Irving MD   Hosp Day # 3 PCP Eboni George MD     Subjective:  Anderson Buerger is a(n) 81year old 13.7   NEPRELIM 3.08 6.05 4.98   WBC 4.0 7.1 6.0   .0 313.0 295.0     Recent Labs   Lab 04/25/19  0551 04/26/19  0557 04/26/19  1730 04/27/19  0631   * 129*  --  134*   BUN 8 14  --  16   CREATSERUM 0.86 0.85  --  0.78   GFRAA 73 74  --  82

## 2019-04-28 ENCOUNTER — APPOINTMENT (OUTPATIENT)
Dept: GENERAL RADIOLOGY | Facility: HOSPITAL | Age: 84
DRG: 191 | End: 2019-04-28
Attending: FAMILY MEDICINE
Payer: MEDICARE

## 2019-04-28 PROCEDURE — 94664 DEMO&/EVAL PT USE INHALER: CPT

## 2019-04-28 PROCEDURE — 85025 COMPLETE CBC W/AUTO DIFF WBC: CPT | Performed by: FAMILY MEDICINE

## 2019-04-28 PROCEDURE — 94640 AIRWAY INHALATION TREATMENT: CPT

## 2019-04-28 PROCEDURE — 80053 COMPREHEN METABOLIC PANEL: CPT | Performed by: FAMILY MEDICINE

## 2019-04-28 PROCEDURE — 71046 X-RAY EXAM CHEST 2 VIEWS: CPT | Performed by: FAMILY MEDICINE

## 2019-04-28 PROCEDURE — 94668 MNPJ CHEST WALL SBSQ: CPT

## 2019-04-28 RX ORDER — POTASSIUM CHLORIDE 20 MEQ/1
40 TABLET, EXTENDED RELEASE ORAL ONCE
Status: COMPLETED | OUTPATIENT
Start: 2019-04-28 | End: 2019-04-28

## 2019-04-28 RX ORDER — METHYLPREDNISOLONE SODIUM SUCCINATE 125 MG/2ML
60 INJECTION, POWDER, LYOPHILIZED, FOR SOLUTION INTRAMUSCULAR; INTRAVENOUS EVERY 12 HOURS
Status: COMPLETED | OUTPATIENT
Start: 2019-04-28 | End: 2019-04-28

## 2019-04-28 NOTE — PROGRESS NOTES
[facility]  Progress Note    Zackary Merlin Patient Status:  Inpatient    1935 MRN [id number]   [facility] 5NW-A Attending Princess Darron MD   Psychiatric Day # 4 PCP Dexter Leal MD     Subjective:  Zackary Merlin is a(n) 81year old 14  --  16   CREATSERUM 0.86 0.85  --  0.78   GFRAA 73 74  --  82   GFRNAA 63 64  --  71   CA 9.0 8.8  --  9.0   * 135*  --  135*   K 3.9 3.6 4.3 4.0    101  --  102   CO2 24.0 28.0  --  27.0     Recent Labs   Lab 04/25/19  1516   ABGPHT 7.47*

## 2019-04-28 NOTE — PROGRESS NOTES
PATIENT A&OX4. DENIES SOB/CHEST PAIN. SATURATING ABOVE 92% ON ROOM AIR. SHE ADMITS SHE IS  \"ANXIOUS OR MAYBE A LITTLE DEPRESSED\". SHE ASKED FOR HER SLEEPING PILL BEFORE HER  LEFT SO SHE'LL FALL ASLEEP \"BEFORE THE ANXIETY GETS WORSE\".   31 Kisha Muro

## 2019-04-28 NOTE — PLAN OF CARE
Problem: DISCHARGE PLANNING  Goal: Discharge to home or other facility with appropriate resources  Description  INTERVENTIONS:  - Identify barriers to discharge w/pt and caregiver  - Include patient/family/discharge partner in discharge Allan Knapp bathing  - Educate and encourage patient/family in tolerated functional activity level and precautions during self-care  Outcome: Progressing

## 2019-04-28 NOTE — PROGRESS NOTES
[facility]  Progress Note    Paras Bernal Patient Status:  Inpatient    1935 MRN [id number]   [facility] 5NW-A Attending Anel Hakw MD   Hosp Day # 4 PCP Hari Nichole MD       SUBJECTIVE:  No CP, SOB, or N/V.   Less cough Registry) which includes the Dose   Index Registry.     PATIENT STATED HISTORY: (As transcribed by Technologist) Justin Mendoza presents with nonproductive cough for two weeks. Laura Baxter has a history of COPD.          FINDINGS:    LUNGS: Claretta Minus is patchy interstitial XR CHEST PA + LAT CHEST (CPT=71046), 4/25/2019, 15:49.     INDICATIONS:  COPD     PATIENT STATED HISTORY: (As transcribed by Technologist)  Patient offered no additional history at this time.          FINDINGS:       Cardiac silhouette is mildly prominent 40 mg Subcutaneous Daily   acetaminophen (TYLENOL) tab 650 mg 650 mg Oral Q6H PRN   temazepam (RESTORIL) cap 7.5 mg 7.5 mg Oral Nightly PRN   benzonatate (TESSALON) cap 200 mg 200 mg Oral TID PRN         Assessment  Patient Active Problem List:     HTN (hy

## 2019-04-29 ENCOUNTER — APPOINTMENT (OUTPATIENT)
Dept: GENERAL RADIOLOGY | Facility: HOSPITAL | Age: 84
DRG: 191 | End: 2019-04-29
Attending: FAMILY MEDICINE
Payer: MEDICARE

## 2019-04-29 ENCOUNTER — APPOINTMENT (OUTPATIENT)
Dept: GENERAL RADIOLOGY | Facility: HOSPITAL | Age: 84
DRG: 191 | End: 2019-04-29
Attending: INTERNAL MEDICINE
Payer: MEDICARE

## 2019-04-29 PROCEDURE — 94640 AIRWAY INHALATION TREATMENT: CPT

## 2019-04-29 PROCEDURE — 71045 X-RAY EXAM CHEST 1 VIEW: CPT | Performed by: FAMILY MEDICINE

## 2019-04-29 PROCEDURE — 71046 X-RAY EXAM CHEST 2 VIEWS: CPT | Performed by: INTERNAL MEDICINE

## 2019-04-29 PROCEDURE — 85025 COMPLETE CBC W/AUTO DIFF WBC: CPT | Performed by: FAMILY MEDICINE

## 2019-04-29 PROCEDURE — 80053 COMPREHEN METABOLIC PANEL: CPT | Performed by: FAMILY MEDICINE

## 2019-04-29 PROCEDURE — 83735 ASSAY OF MAGNESIUM: CPT | Performed by: INTERNAL MEDICINE

## 2019-04-29 RX ORDER — POTASSIUM CHLORIDE 20 MEQ/1
40 TABLET, EXTENDED RELEASE ORAL ONCE
Status: COMPLETED | OUTPATIENT
Start: 2019-04-29 | End: 2019-04-29

## 2019-04-29 NOTE — PROGRESS NOTES
Pt is a 82 y/o female admitted with sob due to COPD exacerbation. alert oriented. denies SOB, fever,pian,N/V.up as tolerated. on nebs and po steroids. no s/s of aspiration noted. will monitor closely. will do 02 walk asap. 02 walk done pt didn't need any 02.   Dry

## 2019-04-29 NOTE — PLAN OF CARE
Problem: COPD exacerbation  Data: Patient alert and oriented overnight, anxious at times. Patient on room air maintaining saturation >90% at rest and with ambulation.  Patient denies pain, up in room with assist with walker, voiding freely, vital signs stab not feel as SOB when walking\"  4/28 noc: get a good night's sleep    Interventions:   -prn sleep aids  - cluster care  - early ambulation  -medications   - See additional Care Plan goals for specific interventions          Outcome: Progressing

## 2019-04-29 NOTE — PROGRESS NOTES
[facility]  Progress Note    Roxana Batres Patient Status:  Inpatient    1935 MRN [id number]   [facility] 5NW-A Attending Lurdes Tucker MD   Hosp Day # 5 PCP Jabier Garcia MD     Subjective:  Roxana Batres is a(n) 81year old Lab 04/27/19  0631 04/28/19  0726 04/29/19  0653   * 123* 86   BUN 16 17 25*   CREATSERUM 0.78 0.72 0.83   GFRAA 82 90 76   GFRNAA 71 78 66   CA 9.0 9.2 8.8   * 134* 134*   K 4.0 3.8 3.7    100 99   CO2 27.0 26.0 30.0     Recent Labs

## 2019-04-29 NOTE — PROGRESS NOTES
[facility]  Progress Note    Guy Rodriguez Patient Status:  Inpatient    1935 MRN [id number]   [facility] 5NW-A Attending Chad Meyer MD   Hosp Day # 4 PCP Oleg Montilla MD       SUBJECTIVE:  No CP, SOB, or N/V.   Nausea and mg Oral Q6H PRN   Or      ondansetron HCl (ZOFRAN) injection 4 mg 4 mg Intravenous Q6H PRN   ipratropium-albuterol (DUONEB) nebulizer solution 3 mL 3 mL Nebulization QID   guaiFENesin ER (MUCINEX) 12 hr tab 1,200 mg 1,200 mg Oral BID   Pantoprazole Sodium Acute right-sided low back pain without sciatica     Hyponatremia     Hypokalemia     COPD exacerbation ([address]     Hypoxia        Plan:   1  COPD EXACERBATION  2  MEDICINE NONCOMPLIANCE  3  ANXIETY  4  CHF  5  GERD  6  LEG EDEMA  7 [address]  8  MUCOUS PLUGGIN

## 2019-04-29 NOTE — PROGRESS NOTES
SPO2% ON ROOM AIR AT REST 93%    SPO2% AMBULATION ON ROOM AIR 89%     SPO2% AMBULATION ON O2 90%  ON 0  LITERS PER MINIUTE

## 2019-04-30 ENCOUNTER — APPOINTMENT (OUTPATIENT)
Dept: GENERAL RADIOLOGY | Facility: HOSPITAL | Age: 84
DRG: 191 | End: 2019-04-30
Attending: INTERNAL MEDICINE
Payer: MEDICARE

## 2019-04-30 PROCEDURE — 89051 BODY FLUID CELL COUNT: CPT | Performed by: INTERNAL MEDICINE

## 2019-04-30 PROCEDURE — 87106 FUNGI IDENTIFICATION YEAST: CPT | Performed by: INTERNAL MEDICINE

## 2019-04-30 PROCEDURE — 87102 FUNGUS ISOLATION CULTURE: CPT | Performed by: INTERNAL MEDICINE

## 2019-04-30 PROCEDURE — 80053 COMPREHEN METABOLIC PANEL: CPT | Performed by: FAMILY MEDICINE

## 2019-04-30 PROCEDURE — 99153 MOD SED SAME PHYS/QHP EA: CPT | Performed by: INTERNAL MEDICINE

## 2019-04-30 PROCEDURE — 0BC68ZZ EXTIRPATION OF MATTER FROM RIGHT LOWER LOBE BRONCHUS, VIA NATURAL OR ARTIFICIAL OPENING ENDOSCOPIC: ICD-10-PCS | Performed by: INTERNAL MEDICINE

## 2019-04-30 PROCEDURE — 89050 BODY FLUID CELL COUNT: CPT | Performed by: INTERNAL MEDICINE

## 2019-04-30 PROCEDURE — 0BC58ZZ EXTIRPATION OF MATTER FROM RIGHT MIDDLE LOBE BRONCHUS, VIA NATURAL OR ARTIFICIAL OPENING ENDOSCOPIC: ICD-10-PCS | Performed by: INTERNAL MEDICINE

## 2019-04-30 PROCEDURE — 87116 MYCOBACTERIA CULTURE: CPT | Performed by: INTERNAL MEDICINE

## 2019-04-30 PROCEDURE — 87206 SMEAR FLUORESCENT/ACID STAI: CPT | Performed by: INTERNAL MEDICINE

## 2019-04-30 PROCEDURE — 87071 CULTURE AEROBIC QUANT OTHER: CPT | Performed by: INTERNAL MEDICINE

## 2019-04-30 PROCEDURE — 99152 MOD SED SAME PHYS/QHP 5/>YRS: CPT | Performed by: INTERNAL MEDICINE

## 2019-04-30 PROCEDURE — 84132 ASSAY OF SERUM POTASSIUM: CPT | Performed by: FAMILY MEDICINE

## 2019-04-30 PROCEDURE — 87205 SMEAR GRAM STAIN: CPT | Performed by: INTERNAL MEDICINE

## 2019-04-30 PROCEDURE — 94664 DEMO&/EVAL PT USE INHALER: CPT

## 2019-04-30 PROCEDURE — 0B9F8ZX DRAINAGE OF RIGHT LOWER LUNG LOBE, VIA NATURAL OR ARTIFICIAL OPENING ENDOSCOPIC, DIAGNOSTIC: ICD-10-PCS | Performed by: INTERNAL MEDICINE

## 2019-04-30 PROCEDURE — 94640 AIRWAY INHALATION TREATMENT: CPT

## 2019-04-30 PROCEDURE — 71045 X-RAY EXAM CHEST 1 VIEW: CPT | Performed by: INTERNAL MEDICINE

## 2019-04-30 RX ORDER — ONDANSETRON 2 MG/ML
4 INJECTION INTRAMUSCULAR; INTRAVENOUS ONCE
Status: DISCONTINUED | OUTPATIENT
Start: 2019-04-30 | End: 2019-04-30 | Stop reason: HOSPADM

## 2019-04-30 RX ORDER — MIDAZOLAM HYDROCHLORIDE 1 MG/ML
INJECTION INTRAMUSCULAR; INTRAVENOUS
Status: DISCONTINUED | OUTPATIENT
Start: 2019-04-30 | End: 2019-04-30 | Stop reason: HOSPADM

## 2019-04-30 RX ORDER — LORAZEPAM 2 MG/ML
0.5 INJECTION INTRAMUSCULAR EVERY 6 HOURS PRN
Status: DISCONTINUED | OUTPATIENT
Start: 2019-04-30 | End: 2019-05-04

## 2019-04-30 RX ORDER — IPRATROPIUM BROMIDE AND ALBUTEROL SULFATE 2.5; .5 MG/3ML; MG/3ML
3 SOLUTION RESPIRATORY (INHALATION) AS NEEDED
Status: DISCONTINUED | OUTPATIENT
Start: 2019-04-30 | End: 2019-04-30 | Stop reason: HOSPADM

## 2019-04-30 RX ORDER — LORAZEPAM 2 MG/ML
1 INJECTION INTRAMUSCULAR EVERY 6 HOURS PRN
Status: DISCONTINUED | OUTPATIENT
Start: 2019-04-30 | End: 2019-05-04

## 2019-04-30 RX ORDER — ACETYLCYSTEINE 200 MG/ML
2 SOLUTION ORAL; RESPIRATORY (INHALATION) 3 TIMES DAILY
Status: DISCONTINUED | OUTPATIENT
Start: 2019-04-30 | End: 2019-05-04

## 2019-04-30 RX ORDER — LIDOCAINE HYDROCHLORIDE 20 MG/ML
INJECTION, SOLUTION INFILTRATION; PERINEURAL
Status: DISCONTINUED | OUTPATIENT
Start: 2019-04-30 | End: 2019-04-30 | Stop reason: HOSPADM

## 2019-04-30 NOTE — PLAN OF CARE
PROBLEM: COPD Exacerbation     ASSESSMENT: Pt is A&OX$, anxious before bed, prn ativan given per [facility] an pm restoril given per [facility] per pt request for sleep. VSS, afebrile. Maintaining O2 sats >94% on Ra. Voids, tolerating diet no c/o n/v/d this shift.  NPO a suctioning and perform as needed  - Assess and instruct to report SOB or any respiratory difficulty  - Respiratory Therapy support as indicated  - Manage/alleviate anxiety  - Monitor for signs/symptoms of CO2 retention  Outcome: Progressing     Problem: Im

## 2019-04-30 NOTE — OPERATIVE REPORT
[address] [facility] Patient Status:  Inpatient    1935 MRN [id number]   [facility] ENDOSCOPY Attending China Irving MD   [facility] Day # 6 PCP Eboni George MD     OPERATIVE REPORT:     DATE OF OPERATION: 19 vocal cords were insufficiently evaluated due to the level of sedation but appeared normal and approximated well. The bronchoscope was then advanced into the trachea.  Evaluation of the trachea and main stem airways revealed copious thick mucoid secretions [facility]

## 2019-04-30 NOTE — PROGRESS NOTES
[facility]  Progress Note    Lakeisha Merida Patient Status:  Inpatient    1935 MRN [id number]   [facility] 5NW-A Attending Yudi Mata MD   Hosp Day # 5 PCP Rosa Martino MD       SUBJECTIVE:  No CP, SOB, or N/V.   Ambulating disintegrating tab 4 mg 4 mg Oral Q6H PRN   Or      ondansetron HCl (ZOFRAN) injection 4 mg 4 mg Intravenous Q6H PRN   ipratropium-albuterol (DUONEB) nebulizer solution 3 mL 3 mL Nebulization QID   guaiFENesin ER (MUCINEX) 12 hr tab 1,200 mg 1,200 mg Oral osteoarthritis of right knee     Acute right-sided low back pain without sciatica     Hyponatremia     Hypokalemia     COPD exacerbation ([address]     Hypoxia        Plan:      1  COPD EXACERBATION  2  MEDICINE NONCOMPLIANCE  3  ANXIETY  4  CHF  5  GERD  6  LE

## 2019-04-30 NOTE — PROGRESS NOTES
[facility]  Progress Note    Maame Adler Patient Status:  Inpatient    1935 MRN [id number]   [facility] 5NW-A Attending Sherlynn Cheadle, MD   [facility] Day # 6 PCP Renaldo Litten, MD     Subjective:  Maame Adler is a(n) 81year old 13.7 13.3 13.4   NEPRELIM 4.98 3.66 6.13   WBC 6.0 4.6 9.9   .0 324.0 296.0     Recent Labs   Lab 04/27/19  0631 04/28/19  0726 04/29/19  0653 04/30/19  0550   * 123* 86  --    BUN 16 17 25*  --    CREATSERUM 0.78 0.72 0.83  --    GFRAA 82 90 and death. She is agreeable to proceed.     MD AARON Darling  04/30/19

## 2019-04-30 NOTE — PROGRESS NOTES
Pt is a 80 y/o female admitted with sob due to COPD exacerbation. alert oriented. denies SOB, fever,pian,N/V.up as tolerated. on nebs and po steroids. no s/s of aspiration noted. will monitor closely. Dry cough,NPO for bronch today.

## 2019-05-01 ENCOUNTER — APPOINTMENT (OUTPATIENT)
Dept: GENERAL RADIOLOGY | Facility: HOSPITAL | Age: 84
DRG: 191 | End: 2019-05-01
Attending: INTERNAL MEDICINE
Payer: MEDICARE

## 2019-05-01 PROCEDURE — 94640 AIRWAY INHALATION TREATMENT: CPT

## 2019-05-01 PROCEDURE — 85025 COMPLETE CBC W/AUTO DIFF WBC: CPT | Performed by: INTERNAL MEDICINE

## 2019-05-01 PROCEDURE — 84132 ASSAY OF SERUM POTASSIUM: CPT | Performed by: FAMILY MEDICINE

## 2019-05-01 PROCEDURE — 71045 X-RAY EXAM CHEST 1 VIEW: CPT | Performed by: INTERNAL MEDICINE

## 2019-05-01 PROCEDURE — 80053 COMPREHEN METABOLIC PANEL: CPT | Performed by: INTERNAL MEDICINE

## 2019-05-01 PROCEDURE — 80053 COMPREHEN METABOLIC PANEL: CPT | Performed by: FAMILY MEDICINE

## 2019-05-01 RX ORDER — POTASSIUM CHLORIDE 20 MEQ/1
40 TABLET, EXTENDED RELEASE ORAL EVERY 4 HOURS
Status: COMPLETED | OUTPATIENT
Start: 2019-05-01 | End: 2019-05-01

## 2019-05-01 NOTE — CM/SW NOTE
Spoke to Victorino Appiah re: possible NIPPV for home (pneumatic stenting). Unsure if patient will qualify for insurance. Ina requesting information be sent via [employer] in attempt to check insurance.  Await approval. / to remain

## 2019-05-01 NOTE — PLAN OF CARE
AxO x4, anxious at times, forgetful. Room air, bilateral lungs diminished, right lung with inspiraotry wheezes, PO steroids, nebs. Productive cough, clear, frothy sputum. PRN cough medicine with some relief. Voids. Bowel sounds active. NO abx.  BLE FELTON, no

## 2019-05-01 NOTE — PROGRESS NOTES
[facility]  Progress Note    Zohra Baez Patient Status:  Inpatient    1935 MRN [id number]   [facility] 5NW-A Attending Tracie Pollock MD   Hosp Day # 7 PCP Manohar Huizar MD       SUBJECTIVE:  No CP, SOB, or N/V.   Somnolent            Impression:     CONCLUSION:    Rotated to the right more than before.  Increasing atelectasis at the right lung base.  Left lung appears stable appears clear.  Cardiomegaly stable.  No sign of acute CHF or pneumothorax.  Consider upright PA and l Technologist) Patricio Hill has right lung volume loss and mucous plugging of the right mainstem bronchus. She has a persistent cough.         FINDINGS:  Improved aeration seen in the right lung.  Right basilar atelectasis is still noted.  Left lung is clear.    worsening mediastinal shift.  Consider worsening atelectasis in the right hemithorax and/or mucous plugging.  Some of the apparent worsening is related to patient rotation.           Dictated by: Sergei Kerr MD on 4/29/2019 at 8:00       Approved by to the ACR ([address] [facility] of Radiology) [address] ([address]) which includes the Dose   Index Registry.     PATIENT STATED HISTORY: (As transcribed by Technologist) Keri Underwood presents with nonproductive cough for two weeks. Carlos Cordoba has a h  (CPT=71045)     TECHNIQUE:  AP chest radiograph was obtained.     COMPARISON:  [facility] , XR CHEST PA + LAT CHEST (CPT=71046), 4/25/2019, 15:49.     INDICATIONS:  COPD     PATIENT STATED HISTORY: (As transcribed by Technologist)  Patient offered no addition lung.           Dictated by: David Patton MD on 4/25/2019 at 15:59       Approved by: David Patton MD             XR CHEST AP PORTABLE (CPT=71045) Once [[id number]] Collected: 04/25/19 0824   Order Status: Completed Updated: 04/25/19 Sammie Gutierrez Propionate (FLONASE) 50 MCG/ACT nasal spray 1 spray 1 spray Each Nare Daily   guaiFENesin-codeine (ROBITUSSIN AC) 100-10 MG/5ML syrup 5 mL 5 mL Oral Q4H PRN   HYDROcodone-acetaminophen (NORCO) 5-325 MG per tab 1 tablet 1 tablet Oral Q4H PRN   LORazepam (AT cpap  Cont steroids nebs  Yellow sputum     Oleg Montilla  5/1/2019  6:03 PM

## 2019-05-01 NOTE — PROGRESS NOTES
[facility]  Progress Note    Anderson Buerger Patient Status:  Inpatient    1935 MRN [id number]   [facility] 5NW-A Attending China Irving MD   Hosp Day # 7 PCP Eboni George MD       SUBJECTIVE:  No CP, SOB, or N/V.   [facility] diltiazem (CARDIZEM CD) 24 hr cap 120 mg 120 mg Oral Daily   Fluticasone Propionate (FLONASE) 50 MCG/ACT nasal spray 1 spray 1 spray Each Nare Daily   guaiFENesin-codeine (ROBITUSSIN AC) 100-10 MG/5ML syrup 5 mL 5 mL Oral Q4H PRN   HYDROcodone-acetaminop Kacmar  5/1/2019  6:10 AM

## 2019-05-01 NOTE — PROGRESS NOTES
[facility]  Progress Note    Zohra Baez Patient Status:  Inpatient    1935 MRN [id number]   [facility] 5NW-A Attending Tracie Pollock MD   [facility] Day # 7 PCP Irving Clarke MD     Subjective:  Zohra Baez is a(n) 81year old 05/01/19  0605   GLU 86 75 78   BUN 25* 21* 22*   CREATSERUM 0.83 0.80 0.76   GFRAA 76 79 84   GFRNAA 66 69 73   CA 8.8 8.7 8.3*   * 132* 134*   K 3.7 4.2  4.1 3.6   CL 99 99 100   CO2 30.0 24.0 31.0     Recent Labs   Lab 04/25/19  1516   ABGPHT 7.47

## 2019-05-01 NOTE — CM/SW NOTE
Per [facility], patient does not qualify for NIPPV. Asked Dr. Sony Retana to review previous CT and he does not see bronchiectasis. Patient will not qualify for CPT vest.   Dr. Meme greenfield texted to inform of above.  /case

## 2019-05-01 NOTE — PLAN OF CARE
PROBLEM: COPD Exacerbation      ASSESSMENT: Pt is A&OX4, anxious before bed, prn ativan given per [facility] and prn restoril given per [facility] per pt request for sleep. VSS, afebrile. Maintaining O2 sats >94% on Ra. Voids, tolerating diet no c/o n/v/d this shift. Romeo Garcia instruct to report SOB or any respiratory difficulty  - Respiratory Therapy support as indicated  - Manage/alleviate anxiety  - Monitor for signs/symptoms of CO2 retention  Outcome: Progressing     Problem: Impaired Activities of Daily Living  Goal: Achiev

## 2019-05-02 ENCOUNTER — APPOINTMENT (OUTPATIENT)
Dept: GENERAL RADIOLOGY | Facility: HOSPITAL | Age: 84
DRG: 191 | End: 2019-05-02
Attending: INTERNAL MEDICINE
Payer: MEDICARE

## 2019-05-02 PROCEDURE — 94664 DEMO&/EVAL PT USE INHALER: CPT

## 2019-05-02 PROCEDURE — 71046 X-RAY EXAM CHEST 2 VIEWS: CPT | Performed by: INTERNAL MEDICINE

## 2019-05-02 PROCEDURE — 94640 AIRWAY INHALATION TREATMENT: CPT

## 2019-05-02 RX ORDER — POLYETHYLENE GLYCOL 3350 17 G/17G
17 POWDER, FOR SOLUTION ORAL DAILY PRN
Status: DISCONTINUED | OUTPATIENT
Start: 2019-05-02 | End: 2019-05-04

## 2019-05-02 RX ORDER — CLOTRIMAZOLE 1 %
CREAM (GRAM) TOPICAL 2 TIMES DAILY
Status: DISCONTINUED | OUTPATIENT
Start: 2019-05-02 | End: 2019-05-04

## 2019-05-02 RX ORDER — FLUCONAZOLE 100 MG/1
100 TABLET ORAL DAILY
Status: DISCONTINUED | OUTPATIENT
Start: 2019-05-02 | End: 2019-05-04

## 2019-05-02 RX ORDER — PREDNISONE 50 MG/1
50 TABLET ORAL
Status: DISCONTINUED | OUTPATIENT
Start: 2019-05-03 | End: 2019-05-04

## 2019-05-02 NOTE — DIETARY NOTE
[facility]     Admitting diagnosis:  COPD exacerbation [address] [address] [facility] [J44.1]    Ht: 5'6\"  Wt: 75.5 kg (166 lb 8 oz). Body mass index is 26.87 kg/m².     Labs/Meds reviewed    Diet: Orders Placed This Encounter      Regul

## 2019-05-02 NOTE — PROGRESS NOTES
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

## 2019-05-02 NOTE — PROGRESS NOTES
Pt is alert and oriented x4 can be anxious at times. RA lungs sound diminshed. Pt with deep congested cough. PT states she isnt coughing anything up, nebs. denies pain, po steroids, Pt up with walker and ambulating in hallway on room air. sp02 >93%.  Bright

## 2019-05-02 NOTE — RESPIRATORY THERAPY NOTE
Completed bedside PFT for patient.   Mrs. Ana Cristina Kaur would be an appropriate candidate for Pulm Rehab

## 2019-05-02 NOTE — CM/SW NOTE
Received order for [facility] eval.  Pt has previously declined [facility], however I spoke with Fidel Clancy from [facility] [place] who will meet with pt and offer again. / to remain available for support and/or discharge planning.

## 2019-05-02 NOTE — HOME CARE LIAISON
Per Farnaz's request, met with patient to offer home health. Patient and spouse agreeable to [facility] for RN PT when discharged home.   Thanks

## 2019-05-02 NOTE — RESPIRATORY THERAPY NOTE
Completed a bedside PFT with patient.   Mrs. Amy Wilson would be a good candidate for [facility]

## 2019-05-02 NOTE — PLAN OF CARE
She is on RA sating 93%. She gets QID DUO and TID muco tolerating treatments well. BS are coarse and wheezing on the right and clear diminished on the left.

## 2019-05-03 ENCOUNTER — APPOINTMENT (OUTPATIENT)
Dept: GENERAL RADIOLOGY | Facility: HOSPITAL | Age: 84
DRG: 191 | End: 2019-05-03
Attending: FAMILY MEDICINE
Payer: MEDICARE

## 2019-05-03 PROCEDURE — 94010 BREATHING CAPACITY TEST: CPT

## 2019-05-03 PROCEDURE — 94640 AIRWAY INHALATION TREATMENT: CPT

## 2019-05-03 PROCEDURE — 94664 DEMO&/EVAL PT USE INHALER: CPT

## 2019-05-03 PROCEDURE — 85025 COMPLETE CBC W/AUTO DIFF WBC: CPT | Performed by: FAMILY MEDICINE

## 2019-05-03 PROCEDURE — 71045 X-RAY EXAM CHEST 1 VIEW: CPT | Performed by: FAMILY MEDICINE

## 2019-05-03 PROCEDURE — 80053 COMPREHEN METABOLIC PANEL: CPT | Performed by: FAMILY MEDICINE

## 2019-05-03 NOTE — PROGRESS NOTES
[facility]  Progress Note    Anderson Buerger Patient Status:  Inpatient    1935 MRN [id number]   [facility] 5NW-A Attending China Irving MD   Hosp Day # 9 PCP Eboni George MD       SUBJECTIVE:  No CP, SOB, or N/V.   Still [facility] solution 3 mL 3 mL Nebulization QID   guaiFENesin ER (MUCINEX) 12 hr tab 1,200 mg 1,200 mg Oral BID   Pantoprazole Sodium (PROTONIX) EC tab 40 mg 40 mg Oral QAM AC   Fluticasone Furoate-Vilanterol (BREO ELLIPTA) 100-25 MCG/INH inhaler 1 puff 1 puff Inhalat 1  COPD EXACERBATION  2  MEDICINE NONCOMPLIANCE  3  ANXIETY  4  CHF  5  GERD  6  LEG EDEMA  7  COUGH  8  MUCOUS PLUGGING VOLUME LOSS  9  BRONCHOMALACIA RIGHT LOWER LOBE   10 CANDIDAL RASH      Bronch shows mucous plug lavaged and bronchomalacia of right

## 2019-05-03 NOTE — PROGRESS NOTES
[facility]  Progress Note    Carrillo Morgan Patient Status:  Inpatient    1935 MRN [id number]   [facility] 5NW-A Attending Jed Looney MD   [facility] Day # 9 PCP Kevin Neal MD     Subjective:  Carrillo Morgan is a(n) 81year old 05/01/19  1719 05/03/19  0629   GLU 78 176* 82   BUN 22* 21* 18   CREATSERUM 0.76 1.00 0.80   GFRAA 84 61 79   GFRNAA 73 53* 69   CA 8.3* 8.6 8.9   * 130* 133*   K 3.6 4.8  4.7 3.8    98 98   CO2 31.0 25.0 27.0     No results for input(s): ABGP

## 2019-05-03 NOTE — PROCEDURES
Kenya Price is an 61-year-old  female who stands 5 feet 5 inches tall and weighs 165 pounds. She underwent simple spirometry on 5/2/2019. She carries a diagnosis of COPD. She has an extensive smoking history.   Results are as follows:    FVC is 1

## 2019-05-03 NOTE — PLAN OF CARE
Problem:  COPD     Data: Alert and oriented. Anxious. Room air. . Patient has husbands CPAP at bedside. Patient needs to trial CPAP tonight. Possible discharge tomorrow. Up with assistance and rolling walker. PO steroids. Saline locked.     Action: Keep Manage/alleviate anxiety  - Monitor for signs/symptoms of CO2 retention  Outcome: Progressing     Problem: Impaired Activities of Daily Living  Goal: Achieve highest/safest level of independence in self care  Description  Interventions:  - Assess ability a

## 2019-05-04 ENCOUNTER — APPOINTMENT (OUTPATIENT)
Dept: GENERAL RADIOLOGY | Facility: HOSPITAL | Age: 84
DRG: 191 | End: 2019-05-04
Attending: FAMILY MEDICINE
Payer: MEDICARE

## 2019-05-04 VITALS
BODY MASS INDEX: 27 KG/M2 | RESPIRATION RATE: 18 BRPM | TEMPERATURE: 98 F | OXYGEN SATURATION: 96 % | WEIGHT: 164.69 LBS | HEART RATE: 61 BPM | SYSTOLIC BLOOD PRESSURE: 122 MMHG | DIASTOLIC BLOOD PRESSURE: 65 MMHG

## 2019-05-04 PROBLEM — J98.4 LUNG ABNORMALITY: Status: ACTIVE | Noted: 2019-05-04

## 2019-05-04 PROBLEM — J47.9 BRONCHIECTASIS (HCC): Status: ACTIVE | Noted: 2019-05-04

## 2019-05-04 PROCEDURE — 85025 COMPLETE CBC W/AUTO DIFF WBC: CPT | Performed by: FAMILY MEDICINE

## 2019-05-04 PROCEDURE — 94640 AIRWAY INHALATION TREATMENT: CPT

## 2019-05-04 PROCEDURE — 71046 X-RAY EXAM CHEST 2 VIEWS: CPT | Performed by: FAMILY MEDICINE

## 2019-05-04 PROCEDURE — 80053 COMPREHEN METABOLIC PANEL: CPT | Performed by: FAMILY MEDICINE

## 2019-05-04 RX ORDER — CLOTRIMAZOLE 1 %
1 CREAM (GRAM) TOPICAL 2 TIMES DAILY
Qty: 1 TUBE | Refills: 0 | Status: SHIPPED | OUTPATIENT
Start: 2019-05-04 | End: 2019-01-01

## 2019-05-04 RX ORDER — FLUCONAZOLE 100 MG/1
100 TABLET ORAL DAILY
Qty: 5 TABLET | Refills: 0 | Status: ON HOLD | OUTPATIENT
Start: 2019-05-05 | End: 2019-06-07

## 2019-05-04 RX ORDER — PREDNISONE 10 MG/1
TABLET ORAL
Qty: 42 TABLET | Refills: 0 | Status: ON HOLD | OUTPATIENT
Start: 2019-05-04 | End: 2019-06-07

## 2019-05-04 NOTE — PROGRESS NOTES
[facility]  Progress Note    Kamille Meadows Patient Status:  Inpatient    1935 MRN [id number]   [facility] 5NW-A Attending Bárbara Harris MD   Hosp Day # 10 PCP Faviola Fang MD       SUBJECTIVE:  No CP, SOB, or N/V.   Feels wel (ATIVAN) injection 0.5 mg 0.5 mg Intravenous Q6H PRN   Or      LORazepam (ATIVAN) injection 1 mg 1 mg Intravenous Q6H PRN   acetylcysteine (MUCOMYST) 20 % solution 2 mL 2 mL Nebulization TID   ondansetron (ZOFRAN-ODT) disintegrating tab 4 mg 4 mg Oral Q6H genu varum of both lower extremities     Post-herpetic polyneuropathy     Episode of recurrent major depressive disorder (HCC)     Degenerative disc disease, lumbar     Chronic right shoulder pain     Primary osteoarthritis of right knee     Acute right-si

## 2019-05-04 NOTE — PROGRESS NOTES
NURSING DISCHARGE NOTE    Discharged Home via Wheelchair. Accompanied by Family member  Belongings Taken by patient/family. Discharge instructions, prescribed medications, and follow-up instructions werediscussed with pt and daughter.  Pt and tristen

## 2019-05-04 NOTE — PLAN OF CARE
Received patient a/ox4. No new complaints. She wore CPAP all night and tolerated well. O2 sats maintained 97% overnight. She was updated on plan of care and verbalizes understanding.

## 2019-05-04 NOTE — PROGRESS NOTES
[facility]  Progress Note    Chica  Patient Status:  Inpatient    1935 MRN [id number]   [facility] 5NW-A Attending Lorna Callaway MD   Hosp Day # 10 PCP Kalli Dawn MD       SUBJECTIVE:  No CP, SOB, or N/V.   Looks gre 1 mg Intravenous Q6H PRN   acetylcysteine (MUCOMYST) 20 % solution 2 mL 2 mL Nebulization TID   ondansetron (ZOFRAN-ODT) disintegrating tab 4 mg 4 mg Oral Q6H PRN   Or      ondansetron HCl (ZOFRAN) injection 4 mg 4 mg Intravenous Q6H PRN   ipratropium-albu major depressive disorder (HCC)     Degenerative disc disease, lumbar     Chronic right shoulder pain     Primary osteoarthritis of right knee     Acute right-sided low back pain without sciatica     Hyponatremia     Hypokalemia     COPD exacerbation ([address]

## 2019-05-04 NOTE — PLAN OF CARE
She is on RA sating 97%. She gets QID duo and TID muco tolerating treatments well. BS are coarse pre treatment. Post treatment the left long is clear diminished and the right has an expiratory wheeze. Will continue treatments and monitoring.

## 2019-05-04 NOTE — PROGRESS NOTES
[facility]  Progress Note    Jeevan Logan Patient Status:  Inpatient    1935 MRN [id number]   [facility] 5NW-A Attending Raleigh Hughes MD   Hosp Day # 10 PCP Vimal Chris MD     Subjective:  Jeevan Logan is a(n) 81year old effusion     Pericarditis     At risk for falling     Spondylosis of lumbar region without myelopathy or radiculopathy     Primary osteoarthritis of both knees     COPD (chronic obstructive pulmonary disease) (HCC)     Acquired genu varum of both lower ext

## 2019-05-04 NOTE — PLAN OF CARE
Problem: RESPIRATORY - ADULT  Goal: Achieves optimal ventilation and oxygenation  Description  INTERVENTIONS:  - Assess for changes in respiratory status  - Assess for changes in mentation and behavior  - Position to facilitate oxygenation and minimize r place.    TEACHING: POC    RESPONSE: Verbalizes understanding but needs reinforcement.

## 2019-05-07 NOTE — CM/SW NOTE
05/07/19 1000   Discharge disposition   Expected discharge disposition Home-Health   Name of Facillity/Home Care/Hospice   [facility]   Home services after discharge Skilled home care   Discharge transportation Private car     Patient discharged on 05/04/20

## 2019-05-21 NOTE — DISCHARGE SUMMARY
[facility]  Discharge Summary    Vaibhav Figueroa Patient Status:  Inpatient    1935 MRN [id number]   [facility] 5NW-A Attending No att. providers found   [address] Day # 10 PCP Eric Dietrich MD     Date of Admission: 2019  2:00 List as of 5/4/2019  3:20 PM    START taking these medications    fluconazole 100 MG Oral Tab  Take 1 tablet (100 mg total) by mouth daily. , Normal, Disp-5 tablet, R-0    predniSONE 10 MG Oral Tab  4 p.o. every morning for 3 days then 3 p.o. every morning Historical    Ascorbic Acid (VITAMIN C OR)  Take 1 tablet by mouth daily. , Historical    Cyanocobalamin (VITAMIN B 12 OR)  Take 1 tablet by mouth daily. , Historical    Triamterene-HCTZ 37.5-25 MG Oral Tab  Take 1 tablet by mouth daily. , Historical    s

## 2019-05-31 ENCOUNTER — HOSPITAL ENCOUNTER (OUTPATIENT)
Age: 84
Discharge: HOME | End: 2019-05-31
Attending: FAMILY MEDICINE
Payer: MEDICARE

## 2019-05-31 ENCOUNTER — APPOINTMENT (OUTPATIENT)
Dept: GENERAL RADIOLOGY | Age: 84
End: 2019-05-31
Attending: FAMILY MEDICINE
Payer: MEDICARE

## 2019-05-31 VITALS
RESPIRATION RATE: 18 BRPM | DIASTOLIC BLOOD PRESSURE: 80 MMHG | HEART RATE: 71 BPM | TEMPERATURE: 99 F | SYSTOLIC BLOOD PRESSURE: 125 MMHG | OXYGEN SATURATION: 95 %

## 2019-05-31 DIAGNOSIS — S16.1XXA STRAIN OF NECK MUSCLE, INITIAL ENCOUNTER: ICD-10-CM

## 2019-05-31 DIAGNOSIS — M43.02 CERVICAL SPONDYLOLYSIS: Primary | ICD-10-CM

## 2019-05-31 PROCEDURE — 93010 ELECTROCARDIOGRAM REPORT: CPT

## 2019-05-31 PROCEDURE — 99214 OFFICE O/P EST MOD 30 MIN: CPT

## 2019-05-31 PROCEDURE — 72050 X-RAY EXAM NECK SPINE 4/5VWS: CPT | Performed by: FAMILY MEDICINE

## 2019-05-31 PROCEDURE — 93010 ELECTROCARDIOGRAM REPORT: CPT | Performed by: INTERNAL MEDICINE

## 2019-05-31 PROCEDURE — 93005 ELECTROCARDIOGRAM TRACING: CPT

## 2019-05-31 NOTE — ED PROVIDER NOTES
Patient Seen in: [facility]    History   Patient presents with:  Neck Pain (musculoskeletal, neurologic)    Stated Complaint: left side neck pain x3 days     HPI    82 y/o female presents with c/o left sided neck pain for 4 Performed by Annabelle Zapata MD at [facility] OR   • OOPHORECTOMY     • OTHER SURGICAL HISTORY      Drain for abdominal abscess   • RADIOFREQUENCY ABLATION OF SACROILIAC JOINT Right 2/9/2017    Performed by Annabelle Zapata MD at [facility] OR   • Geovanni Newberry Right Ear: External ear normal.   Left Ear: External ear normal.   Nose: Nose normal.   Mouth/Throat: Oropharynx is clear and moist.   Eyes: Pupils are equal, round, and reactive to light.  Conjunctivae and EOM are normal.   Neck: Muscular tenderness presen MDM   acute left sided neck pain, pain is reproducible on palpation, turning of head to left, range of motion . No carotid bruit. Full range of motion with good strength in arms and leg.  No chest pains, normal ekg with no st or t wave changes, v

## 2019-06-06 ENCOUNTER — HOSPITAL ENCOUNTER (OUTPATIENT)
Dept: CT IMAGING | Facility: HOSPITAL | Age: 84
Discharge: HOME | DRG: 552 | End: 2019-06-06
Attending: FAMILY MEDICINE
Payer: MEDICARE

## 2019-06-06 ENCOUNTER — HOSPITAL ENCOUNTER (INPATIENT)
Facility: HOSPITAL | Age: 84
LOS: 1 days | Discharge: HOME | DRG: 552 | End: 2019-06-07
Attending: EMERGENCY MEDICINE | Admitting: FAMILY MEDICINE
Payer: MEDICARE

## 2019-06-06 DIAGNOSIS — S12.000A CLOSED DISPLACED FRACTURE OF FIRST CERVICAL VERTEBRA, UNSPECIFIED FRACTURE MORPHOLOGY, INITIAL ENCOUNTER (HCC): Primary | ICD-10-CM

## 2019-06-06 DIAGNOSIS — M47.9 SPONDYLOSIS: ICD-10-CM

## 2019-06-06 LAB
ALBUMIN SERPL-MCNC: 3.4 G/DL (ref 3.4–5)
ALBUMIN/GLOB SERPL: 1 {RATIO} (ref 1–2)
ALP LIVER SERPL-CCNC: 66 U/L (ref 55–142)
ALT SERPL-CCNC: 17 U/L (ref 13–56)
ANION GAP SERPL CALC-SCNC: 8 MMOL/L (ref 0–18)
ANTIBODY SCREEN: NEGATIVE
APTT PPP: 27.7 SECONDS (ref 25.4–36.1)
AST SERPL-CCNC: 15 U/L (ref 15–37)
BASOPHILS # BLD AUTO: 0.07 X10(3) UL (ref 0–0.2)
BASOPHILS NFR BLD AUTO: 0.8 %
BILIRUB SERPL-MCNC: 0.4 MG/DL (ref 0.1–2)
BUN BLD-MCNC: 11 MG/DL (ref 7–18)
BUN/CREAT SERPL: 12.2 (ref 10–20)
CALCIUM BLD-MCNC: 9.1 MG/DL (ref 8.5–10.1)
CHLORIDE SERPL-SCNC: 96 MMOL/L (ref 98–112)
CO2 SERPL-SCNC: 28 MMOL/L (ref 21–32)
CREAT BLD-MCNC: 0.9 MG/DL (ref 0.55–1.02)
DEPRECATED RDW RBC AUTO: 42.4 FL (ref 35.1–46.3)
EOSINOPHIL # BLD AUTO: 0.18 X10(3) UL (ref 0–0.7)
EOSINOPHIL NFR BLD AUTO: 2 %
ERYTHROCYTE [DISTWIDTH] IN BLOOD BY AUTOMATED COUNT: 13.4 % (ref 11–15)
GLOBULIN PLAS-MCNC: 3.4 G/DL (ref 2.8–4.4)
GLUCOSE BLD-MCNC: 138 MG/DL (ref 70–99)
HCT VFR BLD AUTO: 41.7 % (ref 35–48)
HGB BLD-MCNC: 14.5 G/DL (ref 12–16)
IMM GRANULOCYTES # BLD AUTO: 0.04 X10(3) UL (ref 0–1)
IMM GRANULOCYTES NFR BLD: 0.4 %
INR BLD: 0.95 (ref 0.9–1.1)
LYMPHOCYTES # BLD AUTO: 2.44 X10(3) UL (ref 1–4)
LYMPHOCYTES NFR BLD AUTO: 26.7 %
M PROTEIN MFR SERPL ELPH: 6.8 G/DL (ref 6.4–8.2)
MCH RBC QN AUTO: 30.2 PG (ref 26–34)
MCHC RBC AUTO-ENTMCNC: 34.8 G/DL (ref 31–37)
MCV RBC AUTO: 86.9 FL (ref 80–100)
MONOCYTES # BLD AUTO: 1.01 X10(3) UL (ref 0.1–1)
MONOCYTES NFR BLD AUTO: 11.1 %
NEUTROPHILS # BLD AUTO: 5.4 X10 (3) UL (ref 1.5–7.7)
NEUTROPHILS # BLD AUTO: 5.4 X10(3) UL (ref 1.5–7.7)
NEUTROPHILS NFR BLD AUTO: 59 %
OSMOLALITY SERPL CALC.SUM OF ELEC: 276 MOSM/KG (ref 275–295)
PLATELET # BLD AUTO: 221 10(3)UL (ref 150–450)
POTASSIUM SERPL-SCNC: 3.4 MMOL/L (ref 3.5–5.1)
PSA SERPL DL<=0.01 NG/ML-MCNC: 13.1 SECONDS (ref 12.5–14.7)
RBC # BLD AUTO: 4.8 X10(6)UL (ref 3.8–5.3)
RH BLOOD TYPE: POSITIVE
SODIUM SERPL-SCNC: 132 MMOL/L (ref 136–145)
WBC # BLD AUTO: 9.1 X10(3) UL (ref 4–11)

## 2019-06-06 PROCEDURE — 72125 CT NECK SPINE W/O DYE: CPT | Performed by: FAMILY MEDICINE

## 2019-06-06 RX ORDER — POLYETHYLENE GLYCOL 3350 17 G/17G
17 POWDER, FOR SOLUTION ORAL DAILY PRN
Status: DISCONTINUED | OUTPATIENT
Start: 2019-06-06 | End: 2019-06-07

## 2019-06-06 RX ORDER — ENOXAPARIN SODIUM 100 MG/ML
40 INJECTION SUBCUTANEOUS NIGHTLY
Status: DISCONTINUED | OUTPATIENT
Start: 2019-06-06 | End: 2019-06-07

## 2019-06-06 RX ORDER — TRIAMTERENE AND HYDROCHLOROTHIAZIDE 37.5; 25 MG/1; MG/1
1 CAPSULE ORAL DAILY
Status: DISCONTINUED | OUTPATIENT
Start: 2019-06-07 | End: 2019-06-07

## 2019-06-06 RX ORDER — GUAIFENESIN 600 MG
600 TABLET, EXTENDED RELEASE 12 HR ORAL 2 TIMES DAILY
Status: DISCONTINUED | OUTPATIENT
Start: 2019-06-06 | End: 2019-06-07

## 2019-06-06 RX ORDER — SODIUM PHOSPHATE, DIBASIC AND SODIUM PHOSPHATE, MONOBASIC 7; 19 G/133ML; G/133ML
1 ENEMA RECTAL ONCE AS NEEDED
Status: DISCONTINUED | OUTPATIENT
Start: 2019-06-06 | End: 2019-06-07

## 2019-06-06 RX ORDER — ATORVASTATIN CALCIUM 10 MG/1
10 TABLET, FILM COATED ORAL NIGHTLY
Status: DISCONTINUED | OUTPATIENT
Start: 2019-06-06 | End: 2019-06-07

## 2019-06-06 RX ORDER — DOCUSATE SODIUM 100 MG/1
100 CAPSULE, LIQUID FILLED ORAL 2 TIMES DAILY
Status: DISCONTINUED | OUTPATIENT
Start: 2019-06-06 | End: 2019-06-07

## 2019-06-06 RX ORDER — BISACODYL 10 MG
10 SUPPOSITORY, RECTAL RECTAL
Status: DISCONTINUED | OUTPATIENT
Start: 2019-06-06 | End: 2019-06-07

## 2019-06-06 RX ORDER — ACETAMINOPHEN 325 MG/1
650 TABLET ORAL EVERY 6 HOURS PRN
Status: DISCONTINUED | OUTPATIENT
Start: 2019-06-06 | End: 2019-06-07

## 2019-06-06 RX ORDER — MORPHINE SULFATE 4 MG/ML
1 INJECTION, SOLUTION INTRAMUSCULAR; INTRAVENOUS EVERY 2 HOUR PRN
Status: DISCONTINUED | OUTPATIENT
Start: 2019-06-06 | End: 2019-06-07

## 2019-06-06 RX ORDER — POTASSIUM CHLORIDE 14.9 MG/ML
20 INJECTION INTRAVENOUS ONCE
Status: COMPLETED | OUTPATIENT
Start: 2019-06-06 | End: 2019-06-06

## 2019-06-06 RX ORDER — SODIUM CHLORIDE 9 MG/ML
INJECTION, SOLUTION INTRAVENOUS CONTINUOUS
Status: ACTIVE | OUTPATIENT
Start: 2019-06-06 | End: 2019-06-06

## 2019-06-06 RX ORDER — ONDANSETRON 2 MG/ML
4 INJECTION INTRAMUSCULAR; INTRAVENOUS EVERY 4 HOURS PRN
Status: DISCONTINUED | OUTPATIENT
Start: 2019-06-06 | End: 2019-06-06 | Stop reason: DRUGHIGH

## 2019-06-06 RX ORDER — MORPHINE SULFATE 4 MG/ML
4 INJECTION, SOLUTION INTRAMUSCULAR; INTRAVENOUS EVERY 2 HOUR PRN
Status: DISCONTINUED | OUTPATIENT
Start: 2019-06-06 | End: 2019-06-07

## 2019-06-06 RX ORDER — ONDANSETRON 2 MG/ML
4 INJECTION INTRAMUSCULAR; INTRAVENOUS EVERY 6 HOURS PRN
Status: DISCONTINUED | OUTPATIENT
Start: 2019-06-06 | End: 2019-06-07

## 2019-06-06 RX ORDER — METOCLOPRAMIDE HYDROCHLORIDE 5 MG/ML
10 INJECTION INTRAMUSCULAR; INTRAVENOUS EVERY 8 HOURS PRN
Status: DISCONTINUED | OUTPATIENT
Start: 2019-06-06 | End: 2019-06-07

## 2019-06-06 RX ORDER — FLUTICASONE PROPIONATE 50 MCG
1 SPRAY, SUSPENSION (ML) NASAL DAILY
Status: DISCONTINUED | OUTPATIENT
Start: 2019-06-07 | End: 2019-06-07

## 2019-06-06 RX ORDER — LORAZEPAM 0.5 MG/1
0.5 TABLET ORAL EVERY 6 HOURS PRN
Status: DISCONTINUED | OUTPATIENT
Start: 2019-06-06 | End: 2019-06-07

## 2019-06-06 RX ORDER — IPRATROPIUM BROMIDE AND ALBUTEROL SULFATE 2.5; .5 MG/3ML; MG/3ML
3 SOLUTION RESPIRATORY (INHALATION) ONCE
Status: COMPLETED | OUTPATIENT
Start: 2019-06-06 | End: 2019-06-06

## 2019-06-06 RX ORDER — MORPHINE SULFATE 4 MG/ML
2 INJECTION, SOLUTION INTRAMUSCULAR; INTRAVENOUS EVERY 2 HOUR PRN
Status: DISCONTINUED | OUTPATIENT
Start: 2019-06-06 | End: 2019-06-07

## 2019-06-06 RX ORDER — HYDROCODONE BITARTRATE AND ACETAMINOPHEN 5; 325 MG/1; MG/1
1 TABLET ORAL EVERY 4 HOURS PRN
Status: DISCONTINUED | OUTPATIENT
Start: 2019-06-06 | End: 2019-06-07

## 2019-06-06 RX ORDER — IPRATROPIUM BROMIDE AND ALBUTEROL SULFATE 2.5; .5 MG/3ML; MG/3ML
3 SOLUTION RESPIRATORY (INHALATION) EVERY 4 HOURS PRN
Status: DISCONTINUED | OUTPATIENT
Start: 2019-06-06 | End: 2019-06-07

## 2019-06-06 RX ORDER — DILTIAZEM HYDROCHLORIDE 120 MG/1
120 CAPSULE, EXTENDED RELEASE ORAL DAILY
Status: DISCONTINUED | OUTPATIENT
Start: 2019-06-07 | End: 2019-06-07

## 2019-06-06 NOTE — PROGRESS NOTES
RECEIVED PATIENT AT 1600, PT STABLE. This is the Subsequent Medicare Annual Wellness Exam, performed 12 months or more after the Initial AWV or the last Subsequent AWV    I have reviewed the patient's medical history in detail and updated the computerized patient record. History     Past Medical History:   Diagnosis Date    Arthritis     BPH (benign prostatic hyperplasia)     Chronic kidney disease     Chronic pain     BACK NEUROPATHY FEET HANDS    DM neuropathy, type II diabetes mellitus [facility] [address]     DM type 2 causing CKD stage 3 ([facility] [address] 12/17/2012    DM type 2 causing vascular disease ([facility] [address]     Dyslipidemia     Foot ulcer due to secondary DM ([facility] [address] 12/1/2012    GERD (gastroesophageal reflux disease)     HTN     Ill-defined condition 2013    MRSA in wound right leg (BKA)    S/P BKA (below knee amputation) ([facility] [address]     right BKA due to non-healing ulcer    Sleep apnea     Thromboembolus ([facility] [address] 1970'S    BLOOD CLOT LEFT LEG      Past Surgical History:   Procedure Laterality Date    ABDOMEN SURGERY PROC UNLISTED      pilonidal cyst    ECHO 2D ADULT  8/09    normal; LVEF 60%    ECHO STRESS  4/09    7 min; normal    ENDOSCOPY, COLON, DIAGNOSTIC      HX AMPUTATION  2012    left great toe    HX AMPUTATION  2007    BKA right    HX BELOW KNEE AMPUTATION Right     HX CERVICAL FUSION  2009    x2    HX ORTHOPAEDIC  2006    ACDF C4-C7    STRESS TEST MYOVIEW  8/09    walked 8:46; normal; LVEF 51%     Current Outpatient Medications   Medication Sig Dispense Refill    oxyCODONE-acetaminophen (PERCOCET) 5-325 mg per tablet Take 1 Tab by mouth every four (4) hours as needed for Pain for up to 30 days. Max Daily Amount: 6 Tabs. 90 Tab 0    silver sulfADIAZINE (SILVADENE) 1 % topical cream APPLY TO AFFECTED AREA DAILY 85 g 0    ferrous sulfate 325 mg (65 mg iron) tablet TAKE ONE TABLET BY MOUTH 2 TIMES A  Tab 0    glucose blood VI test strips (PRODIGY NO CODING) strip Use to test blood sugar twice daily.  Dx: E11.22, N18.3 200 Strip 1    Insulin Needles, Disposable, (OLIVIA PEN NEEDLE) 32 gauge x 5/32\" ndle USE AS DIRECTED 4 TIMES DAILY 100 Pen Needle 3    pantoprazole (PROTONIX) 40 mg tablet Take 1 Tab by mouth daily. 90 Tab 0    lisinopril (PRINIVIL, ZESTRIL) 10 mg tablet TAKE ONE TABLET BY MOUTH EVERY DAY 90 Tab 0    VENTOLIN HFA 90 mcg/actuation inhaler TAKE 2 PUFFS BY INHALATION EVERY 4 HOURS AS NEEDED FOR WHEEZING 1 Inhaler 4    HUMALOG KWIKPEN INSULIN 100 unit/mL kwikpen INJECT 18 TO 30 UNITS SUBCUTANEOUSLY 3 TIMES DAILY BEFORE BREAKFAST, LUNCH, AND DINNER 15 mL 3    LANTUS SOLOSTAR U-100 INSULIN 100 unit/mL (3 mL) inpn INJECT 40 TO 50 UNITS SUBCUTANEOUSLY TWICE DAILY AS DIRECTED 15 mL 5    LYRICA 75 mg capsule TAKE ONE CAPSULE BY MOUTH EVERY NIGHT 30 Cap 5    rosuvastatin (CRESTOR) 40 mg tablet TAKE ONE TABLET BY MOUTH EVERY DAY 90 Tab 1    VITAMIN D2 50,000 unit capsule TAKE ONE CAPSULE BY MOUTH EVERY WEEK 4 Cap 4    aspirin 81 mg chewable tablet Take 81 mg by mouth daily.        Allergies   Allergen Reactions    Adhesive Tape-Silicones Hives    Sulfa (Sulfonamide Antibiotics) Swelling     Lips eyes     Family History   Problem Relation Age of Onset    Hypertension Mother    [place] Gout Mother     Cancer Father         leukemia    Diabetes Father     Hypertension Sister     Diabetes Maternal Grandmother     Hypertension Maternal Grandmother     Diabetes Paternal Grandmother     Hypertension Paternal Grandmother     Anesth Problems Neg Hx      Social History     Tobacco Use    Smoking status: Never Smoker    Smokeless tobacco: Never Used   Substance Use Topics    Alcohol use: No     Patient Active Problem List   Diagnosis Code    Neuropathy G62.9    HTN (hypertension) I10    Dyslipidemia E78.5    DM type 2 causing vascular disease ([facility] [address] E11.59    DM neuropathy, type II diabetes mellitus ([facility] [address] E11.40    Foot ulcer due to secondary DM ([facility] [address] E13.621, L97.509    Anemia, unspecified D64.9    ARF (acute renal failure) ([facility] [address] N17.9    DM type 2 causing CKD stage 3 ([facility]) E11.22, N18.3    CKD (chronic kidney disease) stage 3, GFR 30-59 ml/min ([facility]) N18.3    Disc herniation [id number]    Spinal stenosis in cervical region M48.02    GABI (obstructive sleep apnea) G47.33    Status post above knee amputation ([facility] [address] Z89.619    Status post amputation of left great toe [facility]) W79.154       Depression Risk Factor Screening:     3 most recent PHQ Screens 4/18/2017   Little interest or pleasure in doing things Not at all   Feeling down, depressed, irritable, or hopeless Not at all   Total Score PHQ 2 0     Alcohol Risk Factor Screening: You do not drink alcohol or very rarely. Functional Ability and Level of Safety:   Hearing Loss  Hearing is good. Activities of Daily Living  The home contains: no safety equipment. Patient does total self care    Fall Risk  No flowsheet data found. Abuse Screen  Patient is not abused    Cognitive Screening   Evaluation of Cognitive Function:  Has your family/caregiver stated any concerns about your memory: no  Normal    Patient Care Team   Patient Care Team:  Scott Ashley MD as PCP - General    Assessment/Plan   Education and counseling provided:  Are appropriate based on today's review and evaluation  End-of-Life planning (with patient's consent)    Diagnoses and all orders for this visit:    1. Type 2 diabetes mellitus with stage 3 chronic kidney disease, with long-term current use of insulin ([facility])  -     METABOLIC PANEL, COMPREHENSIVE  -     MICROALBUMIN, UR, RAND W/ MICROALB/CREAT RATIO  -     HEMOGLOBIN A1C WITH EAG    2. CKD (chronic kidney disease) stage 3, GFR 30-59 ml/min ([facility])  -     CBC W/O DIFF    3. Essential hypertension    4. Dyslipidemia  -     LIPID PANEL    5. Wound of right lower extremity, sequela    6. Chronic bilateral low back pain with bilateral sciatica  -     oxyCODONE-acetaminophen (PERCOCET) 5-325 mg per tablet;  Take 1 Tab by mouth every four (4) hours as needed for Pain for up to 30 days. Max Daily Amount: 6 Tabs.         Health Maintenance Due   Topic Date Due    Shingrix Vaccine Age 49> (1 of 2) 08/10/2003    Pneumococcal 65+ years (2 of 2 - PPSV23) 08/10/2018    MEDICARE YEARLY EXAM  10/29/2018    LIPID PANEL Q1  12/21/2018    COLONOSCOPY  05/03/2019    HEMOGLOBIN A1C Q6M  05/08/2019

## 2019-06-07 ENCOUNTER — APPOINTMENT (OUTPATIENT)
Dept: MRI IMAGING | Facility: HOSPITAL | Age: 84
DRG: 552 | End: 2019-06-07
Attending: NEUROLOGICAL SURGERY
Payer: MEDICARE

## 2019-06-07 VITALS
SYSTOLIC BLOOD PRESSURE: 112 MMHG | WEIGHT: 172 LBS | DIASTOLIC BLOOD PRESSURE: 65 MMHG | TEMPERATURE: 98 F | HEIGHT: 66 IN | HEART RATE: 59 BPM | BODY MASS INDEX: 27.64 KG/M2 | OXYGEN SATURATION: 92 % | RESPIRATION RATE: 18 BRPM

## 2019-06-07 LAB
ALBUMIN SERPL-MCNC: 3 G/DL (ref 3.4–5)
ALBUMIN/GLOB SERPL: 1 {RATIO} (ref 1–2)
ALP LIVER SERPL-CCNC: 56 U/L (ref 55–142)
ALT SERPL-CCNC: 15 U/L (ref 13–56)
ANION GAP SERPL CALC-SCNC: 5 MMOL/L (ref 0–18)
AST SERPL-CCNC: 16 U/L (ref 15–37)
BASOPHILS # BLD AUTO: 0.07 X10(3) UL (ref 0–0.2)
BASOPHILS NFR BLD AUTO: 1 %
BILIRUB SERPL-MCNC: 0.7 MG/DL (ref 0.1–2)
BILIRUB UR QL STRIP.AUTO: NEGATIVE
BUN BLD-MCNC: 13 MG/DL (ref 7–18)
BUN/CREAT SERPL: 18.3 (ref 10–20)
CALCIUM BLD-MCNC: 8.9 MG/DL (ref 8.5–10.1)
CHLORIDE SERPL-SCNC: 98 MMOL/L (ref 98–112)
CLARITY UR REFRACT.AUTO: CLEAR
CO2 SERPL-SCNC: 33 MMOL/L (ref 21–32)
COLOR UR AUTO: YELLOW
CREAT BLD-MCNC: 0.71 MG/DL (ref 0.55–1.02)
DEPRECATED RDW RBC AUTO: 43.2 FL (ref 35.1–46.3)
EOSINOPHIL # BLD AUTO: 0.16 X10(3) UL (ref 0–0.7)
EOSINOPHIL NFR BLD AUTO: 2.2 %
ERYTHROCYTE [DISTWIDTH] IN BLOOD BY AUTOMATED COUNT: 13.2 % (ref 11–15)
GLOBULIN PLAS-MCNC: 2.9 G/DL (ref 2.8–4.4)
GLUCOSE BLD-MCNC: 102 MG/DL (ref 70–99)
GLUCOSE UR STRIP.AUTO-MCNC: NEGATIVE MG/DL
HCT VFR BLD AUTO: 39.2 % (ref 35–48)
HGB BLD-MCNC: 13 G/DL (ref 12–16)
HYALINE CASTS #/AREA URNS AUTO: PRESENT /LPF
IMM GRANULOCYTES # BLD AUTO: 0.02 X10(3) UL (ref 0–1)
IMM GRANULOCYTES NFR BLD: 0.3 %
KETONES UR STRIP.AUTO-MCNC: NEGATIVE MG/DL
LEUKOCYTE ESTERASE UR QL STRIP.AUTO: NEGATIVE
LYMPHOCYTES # BLD AUTO: 2.22 X10(3) UL (ref 1–4)
LYMPHOCYTES NFR BLD AUTO: 30.6 %
M PROTEIN MFR SERPL ELPH: 5.9 G/DL (ref 6.4–8.2)
MCH RBC QN AUTO: 29.6 PG (ref 26–34)
MCHC RBC AUTO-ENTMCNC: 33.2 G/DL (ref 31–37)
MCV RBC AUTO: 89.3 FL (ref 80–100)
MONOCYTES # BLD AUTO: 0.94 X10(3) UL (ref 0.1–1)
MONOCYTES NFR BLD AUTO: 12.9 %
NEUTROPHILS # BLD AUTO: 3.85 X10 (3) UL (ref 1.5–7.7)
NEUTROPHILS # BLD AUTO: 3.85 X10(3) UL (ref 1.5–7.7)
NEUTROPHILS NFR BLD AUTO: 53 %
NITRITE UR QL STRIP.AUTO: NEGATIVE
OSMOLALITY SERPL CALC.SUM OF ELEC: 282 MOSM/KG (ref 275–295)
PH UR STRIP.AUTO: 5 [PH] (ref 4.5–8)
PLATELET # BLD AUTO: 185 10(3)UL (ref 150–450)
POTASSIUM SERPL-SCNC: 3.2 MMOL/L (ref 3.5–5.1)
POTASSIUM SERPL-SCNC: 3.6 MMOL/L (ref 3.5–5.1)
PROT UR STRIP.AUTO-MCNC: NEGATIVE MG/DL
RBC # BLD AUTO: 4.39 X10(6)UL (ref 3.8–5.3)
SODIUM SERPL-SCNC: 136 MMOL/L (ref 136–145)
SP GR UR STRIP.AUTO: 1.01 (ref 1–1.03)
UROBILINOGEN UR STRIP.AUTO-MCNC: <2 MG/DL
WBC # BLD AUTO: 7.3 X10(3) UL (ref 4–11)

## 2019-06-07 PROCEDURE — 72141 MRI NECK SPINE W/O DYE: CPT | Performed by: NEUROLOGICAL SURGERY

## 2019-06-07 PROCEDURE — 99223 1ST HOSP IP/OBS HIGH 75: CPT | Performed by: NEUROLOGICAL SURGERY

## 2019-06-07 RX ORDER — FUROSEMIDE 20 MG/1
20 TABLET ORAL 2 TIMES DAILY
Qty: 30 TABLET | Refills: 0 | Status: SHIPPED | OUTPATIENT
Start: 2019-06-07 | End: 2019-01-01 | Stop reason: ALTCHOICE

## 2019-06-07 RX ORDER — POTASSIUM CHLORIDE 20 MEQ/1
40 TABLET, EXTENDED RELEASE ORAL EVERY 4 HOURS
Status: COMPLETED | OUTPATIENT
Start: 2019-06-07 | End: 2019-06-07

## 2019-06-07 RX ORDER — FUROSEMIDE 20 MG/1
20 TABLET ORAL DAILY
Status: DISCONTINUED | OUTPATIENT
Start: 2019-06-07 | End: 2019-06-07

## 2019-06-07 NOTE — CONSULTS
[facility]  Neurosurgery Consult    Diamond Colón Patient Status:  Inpatient    1935 MRN [id number]   [facility] 3SW-A Attending Louann Dela Cruz MD   Hosp Day # 1 PCP Aiden Edwards MD     REASON FOR CONSULTATION:  Neck pain COLON RESECTION - LEFT N/A 9/17/2014    Performed by Emma Pablo MD at [facility]   • LUMBAR FACET INJECTION Right 6/22/2015    Performed by Annabelle Zapata MD at [facility] MAIN OR   • LUMBAR FACET INJECTION Left 6/15/2015    Performed by Kassy Garcia celecoxib 200 MG Oral Cap Take 200 mg by mouth daily. Disp:  Rfl:  Taking   busPIRone HCl 5 MG Oral Tab Take 5 mg by mouth 2 (two) times daily.    Disp:  Rfl: 2 Taking   Diclofenac Sodium (VOLTAREN) 1 % Transdermal Gel Apply 4 g topically 4 (four) times inhaler 1 puff 1 puff Inhalation Daily   diltiazem (CARDIZEM CD) 24 hr cap 120 mg 120 mg Oral Daily   Fluticasone Propionate (FLONASE) 50 MCG/ACT nasal spray 1 spray 1 spray Each Nare Daily   guaiFENesin ER (MUCINEX) 12 hr tab 600 mg 600 mg Oral BID   HYDR Cranial nerves: Pupils equally round and reactive to light. EOMs intact. Face is symmetrical and sensation is intact. Tongue is midline.     Motor: 5/5 x 4   Sensation: intact to light touch bilaterally   Reflexes: BUE 2+, BLE 1+, no clonus, no almaraz's

## 2019-06-07 NOTE — PROGRESS NOTES
Admitted from ER,per stretcher,accompanied by family,with C1 mild displacement fracture. Alert and oriented,routine admission care rendered. Oriented to room and use of call light. [place] collar in placed,denies numbness or tingling sensation to both arms. Will

## 2019-06-07 NOTE — H&P
[facility]  History & Physical    Carrillo Blocker Patient Status:  Inpatient    1935 MRN [id number]   [facility] 3SW-A Attending Jed Looney MD   Hosp Day # 1 PCP Kevin Neal MD     History of Present Illness:  Carrillo Blocker MD LALY at [facility] MAIN OR   • [address] Right 2/16/2017    Performed by Jhonny Davis MD at [facility] MAIN OR   • REMOVAL GALLBLADDER     • SACROILIAC JOINT INJECTION BILATERAL Bilateral 6/29/2015    Performed by Efrain Banks by mouth every 4 (four) hours as needed for cough. Disp: 180 mL Rfl: 0 Taking   ipratropium-albuterol 0.5-2.5 (3) MG/3ML Inhalation Solution Take 3 mL by nebulization 4 (four) times daily.  Disp: 180 vial Rfl: 1 Taking   LORazepam 0.5 MG Oral Tab Take 1 tab normal, no drainage    or sinus tenderness   Throat:   Lips, mucosa, and tongue normal; teeth and gums normal   Neck:   Supple, symmetrical, trachea midline, no adenopathy;     thyroid:  no enlargement/tenderness/nodules; no carotid    bruit or JVD   Back: effusion     Pericarditis     At risk for falling     Spondylosis of lumbar region without myelopathy or radiculopathy     Primary osteoarthritis of both knees     COPD (chronic obstructive pulmonary disease) (HCC)     Acquired genu varum of both lower ext

## 2019-06-07 NOTE — PLAN OF CARE
Medicated for pain,with good relief. Swallowing pills good,aspen collar in placed. Aspen collar in placed,tolerating well. Safety measures reinforced,call light kept within reach. Seen by DR. GRULLON  earlier,ordered for MRI of CSPINE,no contrast.Pt.verbalizes

## 2019-06-07 NOTE — CM/SW NOTE
Discussed pt during daily rounds. Pt admitted with cervical fracture. [facility]. Resumption of care orders obtained. Melanie Johnson with [address] aware of pts admission and will follow.  / to Gualberto

## 2019-06-07 NOTE — HOME CARE LIAISON
PTNT CURRENT WITH [facility] FOR SN/PT  EOE 7/5/19 WILL NEED JOAQUIN ORDER ON OR BEFORE DC    THANKS  Venice Oshea

## 2019-06-07 NOTE — ED PROVIDER NOTES
Patient Seen in: [facility] Emergency Department    History   Patient presents with:  Abnormal Result (metabolic, cardiac)    Stated Complaint: abn CT of head & neck    HPI    Patient is a pleasant 27-year-old female.   Medical history of hypertensio Right 6/22/2015    Performed by Huma Scott MD at [facility] MAIN OR   • LUMBAR FACET INJECTION Left 6/15/2015    Performed by Huma Scott MD at [facility] MAIN OR   • OOPHORECTOMY     • [address] for abdominal abscess   • [address] the bilateral upper extremities. Back: Full range of motion  Skin: No sign of trauma, Skin warm and dry, no induration or sign of infection. Neuro: Cranial nerves intact, Normal Gait. Case was discussed with neurosurgery.   Patient will be admitted to t SPINE (4VIEWS) (CPT=72050)  TECHNIQUE:  AP, lateral, obliques, and coned down view of the spine were obtained. COMPARISON:  None.   INDICATIONS:  left side neck pain x3 days  PATIENT STATED HISTORY: (As transcribed by Technologist)  Left sided neck pain fo significant central canal or neural foraminal stenosis present at C2-3. At C3-4 there is a small posterior disc osteophyte complex with moderate left neural foraminal stenosis due to facet and uncovertebral hypertrophy.   C4-5 there is a small posterior di 6/06/2019 at 18:06     Approved by: Hamida Cruz MD            Patient arrived without c-collar. She was immediately placed in a c-collar. My supervising physician was involved in the management of this patient.   Patient maintains normal motor function to

## 2019-06-07 NOTE — OCCUPATIONAL THERAPY NOTE
OCCUPATIONAL THERAPY QUICK EVALUATION - INPATIENT    Room Number: 354/354-A  Evaluation Date: 6/7/2019     Type of Evaluation: Initial  Presenting Problem: (c1 FX)    Physician Order: IP Consult to Occupational Therapy  Reason for Therapy:  ADL/IADL Dysfun LUMBAR FACET INJECTION Left 6/15/2015    Performed by Stacia Gbariel MD at [facility] MAIN OR   • OOPHORECTOMY     • OTHER SURGICAL HISTORY      Drain for abdominal abscess   • RADIOFREQUENCY ABLATION OF SACROILIAC JOINT Right 2/9/2017    Performed by Natasha Schuster drying)?: A Little  -   Toileting, which includes using toilet, bedpan or urinal? : A Little  -   Putting on and taking off regular upper body clothing?: A Little  -   Taking care of personal grooming such as brushing teeth?: A Little  -   Eating meals?: N rest and sleep, work, leisure and social participation. The AM-ARNOLD ' '6-Clicks' Inpatient Daily Activity Short Form was completed and  this patient is demonstrating a 43% degree impairment in activities of daily living.  Research supports that patients wi

## 2019-06-07 NOTE — ED INITIAL ASSESSMENT (HPI)
A/O x 4. Patient presents with abnormal CT of the head and neck. Patient states that she has been having left sided neck pain and had a CT and told to come straight to ED.

## 2019-06-07 NOTE — PROGRESS NOTES
[facility]  Neurosurgery Progress Note    Anderson Buerger Patient Status:  Inpatient    1935 MRN [id number]   [facility] 3SW-A Attending China Irving MD   Hosp Day # 1 PCP Eboni George MD     Chief Complaint:  C1 fracture contact with the ventral cord and mild flattening of the ventral cord. Mild bilateral foraminal stenosis present this level. Milder central canal stenosis at other levels including milder cord deformities, without any cord signal abnormalities.   Multi

## 2019-06-07 NOTE — PHYSICAL THERAPY NOTE
PHYSICAL THERAPY QUICK EVALUATION - INPATIENT    Room Number: 354/354-A  Evaluation Date: 6/7/2019  Presenting Problem: C1 fracture, age indeterminate   Physician Order: PT Eval and Treat    Problem List  Principal Problem:    Closed displaced fracture o MEDIAL BRANCH Right 2/16/2017    Performed by Jose Angel Carlin MD at [facility] MAIN OR   • REMOVAL GALLBLADDER     • SACROILIAC JOINT INJECTION BILATERAL Bilateral 6/29/2015    Performed by Jose Angel Carlin MD at [address] [place] [address]  Type of Home: C Impairment: 11.2%   Standardized Score (AM-PAC Scale): 56.93   CMS Modifier (G-Code): CI      FUNCTIONAL ABILITY STATUS  Gait Assessment  Gait Assistance: Modified independent  Distance (ft): 200  Assistive Device: (rollator)  Pattern: (left trendelenburg) of impairment may benefit from home with supervision. Based on this evaluation, patient's clinical presentation is stable and overall evaluation complexity is considered low.   PT Discharge Recommendations: Home(with family assist )    PLAN  Patient has be

## 2019-06-08 NOTE — PLAN OF CARE
MRI done this am.  Seen by Dr. Jude Browne. Pt to wear Aspen collar at all times except Winkler collar to shower. OK to dc home and follow up in 6 weeks. Ambulates with SBA. Seen by Dr. Mat Coffey this evening. Dc'd to home as per order.   Written and jareth

## 2019-06-10 ENCOUNTER — APPOINTMENT (OUTPATIENT)
Dept: ULTRASOUND IMAGING | Facility: HOSPITAL | Age: 84
DRG: 190 | End: 2019-06-10
Attending: FAMILY MEDICINE
Payer: MEDICARE

## 2019-06-10 ENCOUNTER — APPOINTMENT (OUTPATIENT)
Dept: GENERAL RADIOLOGY | Facility: HOSPITAL | Age: 84
DRG: 190 | End: 2019-06-10
Payer: MEDICARE

## 2019-06-10 ENCOUNTER — HOSPITAL ENCOUNTER (INPATIENT)
Facility: HOSPITAL | Age: 84
LOS: 9 days | Discharge: HOME HEALTH | DRG: 190 | End: 2019-01-01
Attending: EMERGENCY MEDICINE | Admitting: FAMILY MEDICINE
Payer: MEDICARE

## 2019-06-10 ENCOUNTER — APPOINTMENT (OUTPATIENT)
Dept: CT IMAGING | Facility: HOSPITAL | Age: 84
DRG: 190 | End: 2019-06-10
Attending: INTERNAL MEDICINE
Payer: MEDICARE

## 2019-06-10 DIAGNOSIS — S12.9XXD CLOSED FRACTURE OF CERVICAL VERTEBRA, UNSPECIFIED CERVICAL VERTEBRAL LEVEL, SUBSEQUENT ENCOUNTER: ICD-10-CM

## 2019-06-10 DIAGNOSIS — J44.1 COPD EXACERBATION (HCC): Primary | ICD-10-CM

## 2019-06-10 PROBLEM — R73.9 HYPERGLYCEMIA: Status: ACTIVE | Noted: 2019-06-10

## 2019-06-10 PROCEDURE — 80053 COMPREHEN METABOLIC PANEL: CPT | Performed by: EMERGENCY MEDICINE

## 2019-06-10 PROCEDURE — 82375 ASSAY CARBOXYHB QUANT: CPT | Performed by: INTERNAL MEDICINE

## 2019-06-10 PROCEDURE — 85018 HEMOGLOBIN: CPT | Performed by: INTERNAL MEDICINE

## 2019-06-10 PROCEDURE — 94640 AIRWAY INHALATION TREATMENT: CPT

## 2019-06-10 PROCEDURE — 87999 UNLISTED MICROBIOLOGY PX: CPT

## 2019-06-10 PROCEDURE — 83880 ASSAY OF NATRIURETIC PEPTIDE: CPT | Performed by: EMERGENCY MEDICINE

## 2019-06-10 PROCEDURE — 99285 EMERGENCY DEPT VISIT HI MDM: CPT

## 2019-06-10 PROCEDURE — 82803 BLOOD GASES ANY COMBINATION: CPT | Performed by: INTERNAL MEDICINE

## 2019-06-10 PROCEDURE — 83050 HGB METHEMOGLOBIN QUAN: CPT | Performed by: INTERNAL MEDICINE

## 2019-06-10 PROCEDURE — 85025 COMPLETE CBC W/AUTO DIFF WBC: CPT

## 2019-06-10 PROCEDURE — 84484 ASSAY OF TROPONIN QUANT: CPT | Performed by: EMERGENCY MEDICINE

## 2019-06-10 PROCEDURE — 80053 COMPREHEN METABOLIC PANEL: CPT

## 2019-06-10 PROCEDURE — 87633 RESP VIRUS 12-25 TARGETS: CPT | Performed by: INTERNAL MEDICINE

## 2019-06-10 PROCEDURE — 85378 FIBRIN DEGRADE SEMIQUANT: CPT | Performed by: INTERNAL MEDICINE

## 2019-06-10 PROCEDURE — 36600 WITHDRAWAL OF ARTERIAL BLOOD: CPT | Performed by: INTERNAL MEDICINE

## 2019-06-10 PROCEDURE — 94660 CPAP INITIATION&MGMT: CPT

## 2019-06-10 PROCEDURE — 71045 X-RAY EXAM CHEST 1 VIEW: CPT | Performed by: EMERGENCY MEDICINE

## 2019-06-10 PROCEDURE — 83605 ASSAY OF LACTIC ACID: CPT | Performed by: EMERGENCY MEDICINE

## 2019-06-10 PROCEDURE — 84145 PROCALCITONIN (PCT): CPT | Performed by: INTERNAL MEDICINE

## 2019-06-10 PROCEDURE — 71275 CT ANGIOGRAPHY CHEST: CPT | Performed by: INTERNAL MEDICINE

## 2019-06-10 PROCEDURE — 83605 ASSAY OF LACTIC ACID: CPT

## 2019-06-10 PROCEDURE — 93005 ELECTROCARDIOGRAM TRACING: CPT

## 2019-06-10 PROCEDURE — 94644 CONT INHLJ TX 1ST HOUR: CPT

## 2019-06-10 PROCEDURE — 96374 THER/PROPH/DIAG INJ IV PUSH: CPT

## 2019-06-10 PROCEDURE — 93010 ELECTROCARDIOGRAM REPORT: CPT

## 2019-06-10 PROCEDURE — 87798 DETECT AGENT NOS DNA AMP: CPT | Performed by: INTERNAL MEDICINE

## 2019-06-10 PROCEDURE — 5A09357 ASSISTANCE WITH RESPIRATORY VENTILATION, LESS THAN 24 CONSECUTIVE HOURS, CONTINUOUS POSITIVE AIRWAY PRESSURE: ICD-10-PCS | Performed by: FAMILY MEDICINE

## 2019-06-10 PROCEDURE — 87486 CHLMYD PNEUM DNA AMP PROBE: CPT | Performed by: INTERNAL MEDICINE

## 2019-06-10 PROCEDURE — 93970 EXTREMITY STUDY: CPT | Performed by: FAMILY MEDICINE

## 2019-06-10 PROCEDURE — 85025 COMPLETE CBC W/AUTO DIFF WBC: CPT | Performed by: EMERGENCY MEDICINE

## 2019-06-10 PROCEDURE — 87581 M.PNEUMON DNA AMP PROBE: CPT | Performed by: INTERNAL MEDICINE

## 2019-06-10 RX ORDER — ATORVASTATIN CALCIUM 10 MG/1
10 TABLET, FILM COATED ORAL NIGHTLY
Status: DISCONTINUED | OUTPATIENT
Start: 2019-06-10 | End: 2019-01-01

## 2019-06-10 RX ORDER — FLUTICASONE PROPIONATE 50 MCG
1 SPRAY, SUSPENSION (ML) NASAL DAILY
Status: DISCONTINUED | OUTPATIENT
Start: 2019-06-10 | End: 2019-01-01

## 2019-06-10 RX ORDER — IPRATROPIUM BROMIDE AND ALBUTEROL SULFATE 2.5; .5 MG/3ML; MG/3ML
3 SOLUTION RESPIRATORY (INHALATION) ONCE AS NEEDED
Status: COMPLETED | OUTPATIENT
Start: 2019-06-10 | End: 2019-06-10

## 2019-06-10 RX ORDER — IPRATROPIUM BROMIDE AND ALBUTEROL SULFATE 2.5; .5 MG/3ML; MG/3ML
3 SOLUTION RESPIRATORY (INHALATION)
Status: DISCONTINUED | OUTPATIENT
Start: 2019-06-10 | End: 2019-06-10 | Stop reason: DRUGHIGH

## 2019-06-10 RX ORDER — ENOXAPARIN SODIUM 100 MG/ML
40 INJECTION SUBCUTANEOUS DAILY
Status: DISCONTINUED | OUTPATIENT
Start: 2019-06-10 | End: 2019-01-01

## 2019-06-10 RX ORDER — PAROXETINE HYDROCHLORIDE 20 MG/1
40 TABLET, FILM COATED ORAL DAILY
Status: DISCONTINUED | OUTPATIENT
Start: 2019-06-10 | End: 2019-06-10

## 2019-06-10 RX ORDER — DILTIAZEM HYDROCHLORIDE 120 MG/1
120 CAPSULE, EXTENDED RELEASE ORAL DAILY
Status: DISCONTINUED | OUTPATIENT
Start: 2019-06-10 | End: 2019-01-01

## 2019-06-10 RX ORDER — PAROXETINE HYDROCHLORIDE 20 MG/1
20 TABLET, FILM COATED ORAL DAILY
Status: DISCONTINUED | OUTPATIENT
Start: 2019-06-11 | End: 2019-01-01

## 2019-06-10 RX ORDER — BUSPIRONE HYDROCHLORIDE 5 MG/1
5 TABLET ORAL 2 TIMES DAILY
Status: DISCONTINUED | OUTPATIENT
Start: 2019-06-10 | End: 2019-06-10

## 2019-06-10 RX ORDER — PAROXETINE 10 MG/1
10 TABLET, FILM COATED ORAL DAILY
Refills: 11 | Status: ON HOLD | COMMUNITY
Start: 2019-05-07 | End: 2019-06-18

## 2019-06-10 RX ORDER — LORAZEPAM 0.5 MG/1
0.5 TABLET ORAL EVERY 6 HOURS PRN
Status: DISCONTINUED | OUTPATIENT
Start: 2019-06-10 | End: 2019-01-01

## 2019-06-10 RX ORDER — BENZONATATE 200 MG/1
200 CAPSULE ORAL 3 TIMES DAILY PRN
COMMUNITY

## 2019-06-10 RX ORDER — TRIAMTERENE AND HYDROCHLOROTHIAZIDE 37.5; 25 MG/1; MG/1
1 TABLET ORAL DAILY
Status: ON HOLD | COMMUNITY
End: 2019-06-18

## 2019-06-10 RX ORDER — TEMAZEPAM 15 MG/1
15 CAPSULE ORAL NIGHTLY PRN
Status: DISCONTINUED | OUTPATIENT
Start: 2019-06-10 | End: 2019-06-13

## 2019-06-10 RX ORDER — BENZONATATE 200 MG/1
200 CAPSULE ORAL 3 TIMES DAILY PRN
Status: DISCONTINUED | OUTPATIENT
Start: 2019-06-10 | End: 2019-01-01

## 2019-06-10 RX ORDER — CYCLOBENZAPRINE HCL 5 MG
5 TABLET ORAL NIGHTLY
Refills: 1 | Status: ON HOLD | COMMUNITY
Start: 2019-06-03 | End: 2019-06-18

## 2019-06-10 RX ORDER — GUAIFENESIN 600 MG
600 TABLET, EXTENDED RELEASE 12 HR ORAL 2 TIMES DAILY
Status: DISCONTINUED | OUTPATIENT
Start: 2019-06-10 | End: 2019-06-11

## 2019-06-10 RX ORDER — IPRATROPIUM BROMIDE AND ALBUTEROL SULFATE 2.5; .5 MG/3ML; MG/3ML
3 SOLUTION RESPIRATORY (INHALATION) EVERY 4 HOURS
Status: DISCONTINUED | OUTPATIENT
Start: 2019-06-10 | End: 2019-06-14

## 2019-06-10 RX ORDER — HYDROCODONE BITARTRATE AND ACETAMINOPHEN 5; 325 MG/1; MG/1
1 TABLET ORAL EVERY 4 HOURS PRN
Status: DISCONTINUED | OUTPATIENT
Start: 2019-06-10 | End: 2019-01-01

## 2019-06-10 RX ORDER — METHYLPREDNISOLONE SODIUM SUCCINATE 40 MG/ML
40 INJECTION, POWDER, LYOPHILIZED, FOR SOLUTION INTRAMUSCULAR; INTRAVENOUS EVERY 8 HOURS
Status: DISPENSED | OUTPATIENT
Start: 2019-06-10 | End: 2019-06-12

## 2019-06-10 RX ORDER — METHYLPREDNISOLONE SODIUM SUCCINATE 125 MG/2ML
125 INJECTION, POWDER, LYOPHILIZED, FOR SOLUTION INTRAMUSCULAR; INTRAVENOUS ONCE
Status: COMPLETED | OUTPATIENT
Start: 2019-06-10 | End: 2019-06-10

## 2019-06-10 RX ORDER — FUROSEMIDE 20 MG/1
20 TABLET ORAL 2 TIMES DAILY
Status: DISCONTINUED | OUTPATIENT
Start: 2019-06-10 | End: 2019-06-11

## 2019-06-10 RX ORDER — IPRATROPIUM BROMIDE AND ALBUTEROL SULFATE 2.5; .5 MG/3ML; MG/3ML
3 SOLUTION RESPIRATORY (INHALATION)
Status: DISCONTINUED | OUTPATIENT
Start: 2019-06-10 | End: 2019-06-10

## 2019-06-10 NOTE — ED NOTES
Patient still wheezing/ coarse lung sounds however states she feels her breathing has much improved.

## 2019-06-10 NOTE — CONSULTS
[facility]  Report of Consultation    Chepe Part Patient Status:  Inpatient    1935 MRN [id number]   [facility] 5NW-A Attending Eugenio Garner MD   Hosp Day # 0 PCP Shawnee Cerna MD     Reason for Consultation:  Acute wor HISTORY:    · Severe COPD  · Essential hypertension  · Hyperlipidemia  · Severe degenerative arthritis (corroborated by her cervical spine imaging)    Past Surgical History:   Procedure Laterality Date   • BRONCHOSCOPY N/A 4/30/2019    Performed by Erasmo Guzman Rfl:  6/9/2019 at Unknown time   benzonatate 200 MG Oral Cap Take 200 mg by mouth 3 (three) times daily as needed for cough. Disp:  Rfl:  Past Week at Unknown time   furosemide 20 MG Oral Tab Take 1 tablet (20 mg total) by mouth 2 (two) times daily.  Disp: Rfl:  6/9/2019 at Unknown time   Cyanocobalamin (VITAMIN B 12 OR) Take 1 tablet by mouth daily. Disp:  Rfl:  6/9/2019 at Unknown time   simvastatin (ZOCOR) 20 MG Oral Tab Take 20 mg by mouth daily.    Disp:  Rfl: 5 6/9/2019 at Unknown time   Multiple Vit MCHC 34.8 33.2 33.3   RDW 13.4 13.2 13.2   NEPRELIM 5.40 3.85 3.43   WBC 9.1 7.3 7.0   .0 185.0 229.0     Recent Labs   Lab 06/06/19  1939 06/07/19  0450 06/07/19  1742 06/10/19  0648   * 102*  --  104*   BUN 11 13  --  10   CREATSERUM 0.90

## 2019-06-10 NOTE — ED PROVIDER NOTES
Patient Seen in: [facility] Emergency Department    History   Patient presents with:  Dyspnea BACILIO SOB (respiratory)    Stated Complaint:     HPI    Trouble breathing -21-year-old woman with a history of COPD presents to the emergency department with JOINT Right 2/9/2017    Performed by Selvin Montano MD at [facility] MAIN OR   • [address] Right 2/16/2017    Performed by Selvin Montano MD at [facility] MAIN OR   • REMOVAL GALLBLADDER     • [address] DIFFERENTIAL WITH PLATELET    Narrative: The following orders were created for panel order CBC WITH DIFFERENTIAL WITH PLATELET.   Procedure                               Abnormality         Status                     --------- to breathe. She comes in wheezing in all lung fields has a history of COPD has been to still smoking room does smell like cigarettes.   Patient is a C1 fracture it is really not safe that she is not wearing her collar but states that she is unable to breat

## 2019-06-10 NOTE — ED INITIAL ASSESSMENT (HPI)
Patient woke up this morning with increased shortness of breath. History of COPD. Coughing more since yesterday. Patient states productive cough with white phlegm. Denies fevers.

## 2019-06-10 NOTE — CM/SW NOTE
06/10/19 1600   CM/SW Screening   Referral Source    Information Source Chart review;Nursing rounds   Patient's Mental Status Alert;Oriented   Patient lives with Spouse   Patient Status Prior to Admission   Independent with ADLs and Mobility

## 2019-06-10 NOTE — PROGRESS NOTES
NURSING ADMISSION NOTE      Patient admitted via Cart  Oriented to room. Safety precautions initiated. Bed in low position. Call light in reach. Pt arrived to room at this time. Alert. Admission database completed as able.  Pt is a poor historian at

## 2019-06-10 NOTE — CM/SW NOTE
06/10/19 0700   Discharge disposition   Expected discharge disposition Home-Health   Name of Facillity/Home Care/Hospice Residential   Discharge transportation Private car   DC 6/7/19

## 2019-06-10 NOTE — PLAN OF CARE
DX: COPD  PLAN OF CARE: IV STEROIDS, TELE, WEAN O2 AS ABLE, NEBS  PT COMMUNICATES UNDERSTANDING, VENOUS DOPPLER ORDERED.  SINUS TACH ON TELE, CURRENTLY ON 1 LITERS NASAL CANNULA   Problem: Patient/Family Goals  Goal: Patient/Family Long Term Goal  Descripti

## 2019-06-11 PROCEDURE — 80053 COMPREHEN METABOLIC PANEL: CPT | Performed by: FAMILY MEDICINE

## 2019-06-11 PROCEDURE — 97116 GAIT TRAINING THERAPY: CPT

## 2019-06-11 PROCEDURE — 83735 ASSAY OF MAGNESIUM: CPT | Performed by: FAMILY MEDICINE

## 2019-06-11 PROCEDURE — 94640 AIRWAY INHALATION TREATMENT: CPT

## 2019-06-11 PROCEDURE — 94664 DEMO&/EVAL PT USE INHALER: CPT

## 2019-06-11 PROCEDURE — 97162 PT EVAL MOD COMPLEX 30 MIN: CPT

## 2019-06-11 PROCEDURE — 85025 COMPLETE CBC W/AUTO DIFF WBC: CPT | Performed by: FAMILY MEDICINE

## 2019-06-11 PROCEDURE — 97530 THERAPEUTIC ACTIVITIES: CPT

## 2019-06-11 PROCEDURE — 94668 MNPJ CHEST WALL SBSQ: CPT

## 2019-06-11 PROCEDURE — 94667 MNPJ CHEST WALL 1ST: CPT

## 2019-06-11 PROCEDURE — 84443 ASSAY THYROID STIM HORMONE: CPT | Performed by: FAMILY MEDICINE

## 2019-06-11 RX ORDER — FUROSEMIDE 20 MG/1
20 TABLET ORAL DAILY
Status: DISCONTINUED | OUTPATIENT
Start: 2019-06-12 | End: 2019-01-01

## 2019-06-11 RX ORDER — POTASSIUM CHLORIDE 20 MEQ/1
40 TABLET, EXTENDED RELEASE ORAL EVERY 4 HOURS
Status: DISCONTINUED | OUTPATIENT
Start: 2019-06-11 | End: 2019-06-11

## 2019-06-11 RX ORDER — GUAIFENESIN 600 MG
1200 TABLET, EXTENDED RELEASE 12 HR ORAL 2 TIMES DAILY
Status: DISCONTINUED | OUTPATIENT
Start: 2019-06-11 | End: 2019-01-01

## 2019-06-11 RX ORDER — TRAZODONE HYDROCHLORIDE 50 MG/1
50 TABLET ORAL NIGHTLY
Status: DISCONTINUED | OUTPATIENT
Start: 2019-06-11 | End: 2019-01-01

## 2019-06-11 NOTE — PHYSICAL THERAPY NOTE
PHYSICAL THERAPY EVALUATION - INPATIENT     Room Number: 524/524-A  Evaluation Date: 6/11/2019  Type of Evaluation: Initial  Physician Order: PT Eval and Treat    Presenting Problem: COPD exacerbation  Reason for Therapy: Mobility Dysfunction and Dis ERCP,REMVAL STONES     • HERNIA SURGERY  05/18/16   • HERNIA VENTRAL REPAIR N/A 5/18/2016    Performed by Renu Meeks MD at [address] [place] [address] - LEFT N/A 9/17/2014    Performed by Renu Meeks MD at [address] [place] [address] extremity ROM is within functional limits    Lower extremity strength is grossly 4-/5 bilaterally    BALANCE  Static Sitting: Good  Dynamic Sitting: Fair +  Static Standing: Fair  Dynamic Standing: Fair -    ADDITIONAL TESTS teach-back with minimal cues   Sit to supine: pt declines at this time; pt educated on log roll technique for bed mobility; pt able to teach-back with minimal cues   Sit to Stand: supervision, with rollator; max cues for safety and need for placement of ro provided; All patient questions and concerns addressed; Other (Comment)(pt sitting edge of bed)    ASSESSMENT   Patient is a 81year old female admitted on 6/10/2019 for COPD exacerbation; pt with recent C1 fracture with aspen collar intact.   Pertinent comor verbalize and comply with spine precautions during functional tasks without cues.     Goal #5    Goal #6    Goal Comments: Goals established on 6/11/2019

## 2019-06-11 NOTE — RESPIRATORY THERAPY NOTE
LARISSA Equipment Usage Summary :            Set Mode :AUTO CPAP W FLEX          Usage in UORFS:12;68          90% Pressure (EPAP) : 6           90% Insp Pressure (IPAP);           AHI : 40.8          Supplemental Oxygen :  2    LPM

## 2019-06-11 NOTE — PROGRESS NOTES
06/11/19 1447   Clinical Encounter Type   Visited With Patient   Patient's Supportive Strategies/Resources Father Jeri Levi provided prayer, Scripture, support and Sacrament of the Sick.     Sacramental Encounters   Sacrament of Sick-Anointing Anointed

## 2019-06-11 NOTE — PLAN OF CARE
Multidisciplinary Discharge Rounds held 6/11/2019. Treatment team members present today include , , Charge Nurse, Nurse, RT, PT and Pharmacy caring for [employer].      Other care providers present: [facility]

## 2019-06-11 NOTE — RESPIRATORY THERAPY NOTE
Received pt on 2L nasal cannula, tolerating well. Duoneb Q4 via MCPAP. Pt lasted only about 20 minutes on her CPAP last night before she refused. Breath sounds coarse and diminished with wheezing at times. Will continue to monitor.

## 2019-06-11 NOTE — H&P
[facility]  History & Physical    Abdelrahman Patel Patient Status:  Inpatient    1935 MRN [id number]   [facility] 5NW-A Attending Angel Garcia MD   Hosp Day # 1 PCP Louie Wooten MD     History of Present Illness:  Abdelrahman Zapatamaxx BRANCH Right 2/16/2017    Performed by Huma Scott MD at [facility] [place]   • REMOVAL GALLBLADDER     • SACROILIAC JOINT INJECTION BILATERAL Bilateral 6/29/2015    Performed by Huma Scott MD at [facility] [place]     Family History   Problem Relation Age of daily. Disp: 1 Bottle Rfl: 2 6/9/2019 at Unknown time   ipratropium-albuterol 0.5-2.5 (3) MG/3ML Inhalation Solution Take 3 mL by nebulization 4 (four) times daily.  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

## 2019-06-11 NOTE — PROGRESS NOTES
[facility]  Progress Note    Jarvis Starr Patient Status:  Inpatient    1935 MRN [id number]   [facility] 5NW-A Attending Augusto Horn MD   Hosp Day # 1 PCP Yuli Diaz MD     Subjective:  Jarvis Starr is a(n) 81year old RBC 4.39 4.82 4.31   HGB 13.0 14.3 13.0   HCT 39.2 43.0 37.8   MCV 89.3 89.2 87.7   MCH 29.6 29.7 30.2   MCHC 33.2 33.3 34.4   RDW 13.2 13.2 13.1   NEPRELIM 3.85 3.43 7.18   WBC 7.3 7.0 8.9   .0 229.0 196.0     Recent Labs   Lab 06/07/19  0450 06/

## 2019-06-11 NOTE — PLAN OF CARE
Problem: here for COPD  Data: pt is A&O X4, on 1L NC, SR-ST on tele, voids, up self  Action: cough, nebs   Education: pt aware of plan of care  Response: pt verbalizes understanding     Problem: Patient/Family Goals  Goal: Patient/Family Long Term Goal  Eva Lank

## 2019-06-11 NOTE — PLAN OF CARE
Problem: Patient/Family Goals  Goal: Patient/Family Long Term Goal  Description  Patient's Long Term Goal: Discharge back to home without needing oxygen    Interventions:  - Pulm consult  - Respiratory care  - See additional Care Plan goals for specific

## 2019-06-12 ENCOUNTER — APPOINTMENT (OUTPATIENT)
Dept: GENERAL RADIOLOGY | Facility: HOSPITAL | Age: 84
DRG: 190 | End: 2019-06-12
Attending: FAMILY MEDICINE
Payer: MEDICARE

## 2019-06-12 ENCOUNTER — APPOINTMENT (OUTPATIENT)
Dept: GENERAL RADIOLOGY | Facility: HOSPITAL | Age: 84
DRG: 190 | End: 2019-06-12
Attending: INTERNAL MEDICINE
Payer: MEDICARE

## 2019-06-12 PROCEDURE — 94668 MNPJ CHEST WALL SBSQ: CPT

## 2019-06-12 PROCEDURE — 84132 ASSAY OF SERUM POTASSIUM: CPT | Performed by: FAMILY MEDICINE

## 2019-06-12 PROCEDURE — 94664 DEMO&/EVAL PT USE INHALER: CPT

## 2019-06-12 PROCEDURE — 80053 COMPREHEN METABOLIC PANEL: CPT | Performed by: FAMILY MEDICINE

## 2019-06-12 PROCEDURE — 71046 X-RAY EXAM CHEST 2 VIEWS: CPT | Performed by: INTERNAL MEDICINE

## 2019-06-12 PROCEDURE — 94640 AIRWAY INHALATION TREATMENT: CPT

## 2019-06-12 PROCEDURE — 85025 COMPLETE CBC W/AUTO DIFF WBC: CPT | Performed by: FAMILY MEDICINE

## 2019-06-12 RX ORDER — ACETYLCYSTEINE 200 MG/ML
2 SOLUTION ORAL; RESPIRATORY (INHALATION)
Status: DISCONTINUED | OUTPATIENT
Start: 2019-06-12 | End: 2019-01-01

## 2019-06-12 NOTE — PROGRESS NOTES
[facility]  Progress Note    Terry Pacheco Patient Status:  Inpatient    1935 MRN [id number]   [facility] 5NW-A Attending Rahul Shah MD   Hosp Day # 2 PCP Ike Juan MD     Subjective:  Terry Pacheco is a(n) 81year old 06/12/19  0652 06/12/19  1027   * 142*  --  139*   BUN 10 13  --  14   CREATSERUM 0.75 0.70  --  0.86   GFRAA 86 93  --  73   GFRNAA 74 81  --  63   CA 9.2 8.7  --  8.9    139  --  139   K 3.6 3.5 4.1 4.2    103  --  103   CO2 29.0 28.0

## 2019-06-12 NOTE — RESPIRATORY THERAPY NOTE
Received pt on 2L NC. Mask CPAP nebs Q4. Breath sounds diminished/coarse with scattered wheezes. Cpap at night. Will continue to monitor.

## 2019-06-12 NOTE — PROGRESS NOTES
Pt is a 80 y/o female admitted with sob due to COPD exacerbation . alert oriented with mild anxious. tolerated CPAP last night. on 2l 02 via NC.02 sats 90-94%. denies SOB, fever,pian,N/V.up as tolerated. on nebs and po steroids and iv zithromax. cervical fractur

## 2019-06-12 NOTE — PROGRESS NOTES
[facility]  Progress Note    Teto Stokes Patient Status:  Inpatient    1935 MRN [id number]   [facility] [place] 5NW-A Attending Xiomara Monroe MD   Hosp Day # 2 PCP Donna Kelley MD       SUBJECTIVE:  No CP, SOB, or N/V.   Less sob Daily with breakfast   traZODone HCl (DESYREL) tab 50 mg 50 mg Oral Nightly   furosemide (LASIX) tab 20 mg 20 mg Oral Daily   albuterol Sulfate (VENTOLIN) (5 MG/ML) 0.5% nebulizer solution 10 mg 10 mg Nebulization Continuous   Fluticasone Furoate-Vilantero Closed displaced fracture of first cervical vertebra (HCC)     Closed displaced fracture of first cervical vertebra, unspecified fracture morphology, [facility]     Hyperglycemia     Closed fracture of cervical vertebra, unspecified cervical v

## 2019-06-12 NOTE — PLAN OF CARE
Patient alert and orientated. Oxygen WNL on 2L nasal cannula, . LARISSA with cpap. Pt with no complaints of pain. Medications given per MAR. IV Sl. Tolerating diet. Voiding. Up with assistance and walker to bathroom.  Pt instructed to call for help, call lig

## 2019-06-12 NOTE — PLAN OF CARE
Problem: Patient/Family Goals  Goal: Patient/Family Long Term Goal  Description  Patient's Long Term Goal:     Interventions:    - See additional Care Plan goals for specific interventions  Outcome: Progressing  Goal: Patient/Family Short Term Goal  Desc

## 2019-06-12 NOTE — RESPIRATORY THERAPY NOTE
LARISSA Equipment Usage Summary :            Set Mode :AUTO CPAP W FLEX          Usage in Hours:9;37          90% Pressure (EPAP) : 7.8           90% Insp Pressure (IPAP);           AHI : 2.8          Supplemental Oxygen :  2    LPM

## 2019-06-13 ENCOUNTER — APPOINTMENT (OUTPATIENT)
Dept: GENERAL RADIOLOGY | Facility: HOSPITAL | Age: 84
DRG: 190 | End: 2019-06-13
Attending: FAMILY MEDICINE
Payer: MEDICARE

## 2019-06-13 PROCEDURE — 94640 AIRWAY INHALATION TREATMENT: CPT

## 2019-06-13 PROCEDURE — 80053 COMPREHEN METABOLIC PANEL: CPT | Performed by: FAMILY MEDICINE

## 2019-06-13 PROCEDURE — 85025 COMPLETE CBC W/AUTO DIFF WBC: CPT | Performed by: FAMILY MEDICINE

## 2019-06-13 PROCEDURE — 93005 ELECTROCARDIOGRAM TRACING: CPT

## 2019-06-13 PROCEDURE — 93010 ELECTROCARDIOGRAM REPORT: CPT | Performed by: INTERNAL MEDICINE

## 2019-06-13 PROCEDURE — 84484 ASSAY OF TROPONIN QUANT: CPT | Performed by: INTERNAL MEDICINE

## 2019-06-13 PROCEDURE — 84132 ASSAY OF SERUM POTASSIUM: CPT | Performed by: FAMILY MEDICINE

## 2019-06-13 PROCEDURE — 71045 X-RAY EXAM CHEST 1 VIEW: CPT | Performed by: FAMILY MEDICINE

## 2019-06-13 PROCEDURE — S0028 INJECTION, FAMOTIDINE, 20 MG: HCPCS | Performed by: INTERNAL MEDICINE

## 2019-06-13 RX ORDER — MAGNESIUM HYDROXIDE/ALUMINUM HYDROXICE/SIMETHICONE 120; 1200; 1200 MG/30ML; MG/30ML; MG/30ML
30 SUSPENSION ORAL 4 TIMES DAILY PRN
Status: DISCONTINUED | OUTPATIENT
Start: 2019-06-13 | End: 2019-01-01

## 2019-06-13 RX ORDER — FAMOTIDINE 10 MG/ML
20 INJECTION, SOLUTION INTRAVENOUS ONCE
Status: COMPLETED | OUTPATIENT
Start: 2019-06-13 | End: 2019-06-13

## 2019-06-13 RX ORDER — FAMOTIDINE 20 MG/1
20 TABLET ORAL 2 TIMES DAILY
Status: DISCONTINUED | OUTPATIENT
Start: 2019-06-13 | End: 2019-06-14

## 2019-06-13 RX ORDER — MAGNESIUM HYDROXIDE/ALUMINUM HYDROXICE/SIMETHICONE 120; 1200; 1200 MG/30ML; MG/30ML; MG/30ML
30 SUSPENSION ORAL
Status: DISCONTINUED | OUTPATIENT
Start: 2019-06-13 | End: 2019-06-13

## 2019-06-13 RX ORDER — POTASSIUM CHLORIDE 1.5 G/1.77G
40 POWDER, FOR SOLUTION ORAL EVERY 4 HOURS
Status: COMPLETED | OUTPATIENT
Start: 2019-06-13 | End: 2019-06-13

## 2019-06-13 RX ORDER — FAMOTIDINE 20 MG/1
20 TABLET ORAL 2 TIMES DAILY
Status: DISCONTINUED | OUTPATIENT
Start: 2019-06-13 | End: 2019-06-13 | Stop reason: SDUPTHER

## 2019-06-13 RX ORDER — FAMOTIDINE 10 MG/ML
20 INJECTION, SOLUTION INTRAVENOUS ONCE
Status: DISCONTINUED | OUTPATIENT
Start: 2019-06-13 | End: 2019-06-13 | Stop reason: SDUPTHER

## 2019-06-13 NOTE — PLAN OF CARE
Problem: Patient/Family Goals  Goal: Patient/Family Long Term Goal  Description  Patient's Long Term Goal: To discharge home with adequate resources     Interventions:  - comply with POC   - See additional Care Plan goals for specific interventions   Out

## 2019-06-13 NOTE — PROGRESS NOTES
Pt is a 80 y/o female admitted with sob due to COPD exacerbation and pna .alert oriented with mild anxious. tolerated CPAP last night. on room air.02 sats 90-94%. denies SOB, fever,pian,N/V.up as tolerated. on nebs and po steroids and iv zithromax. cervical fra

## 2019-06-13 NOTE — PROGRESS NOTES
[facility]  Progress Note    Chica  Patient Status:  Inpatient    1935 MRN [id number]   [facility] 5NW-A Attending Lorna Callaway MD   Hosp Day # 3 PCP Kalli Dawn MD       SUBJECTIVE:  No CP, SOB, or N/V.   Improving predniSONE (DELTASONE) tab 60 mg 60 mg Oral Daily with breakfast   traZODone HCl (DESYREL) tab 50 mg 50 mg Oral Nightly   furosemide (LASIX) tab 20 mg 20 mg Oral Daily   albuterol Sulfate (VENTOLIN) (5 MG/ML) 0.5% nebulizer solution 10 mg 10 mg Nebulizat cervical vertebra (HCC)     Closed displaced fracture of first cervical vertebra, unspecified fracture morphology, initial encounter (HCC)     Hyperglycemia     Closed fracture of cervical vertebra, unspecified cervical vertebral level, subsequent encounte

## 2019-06-13 NOTE — PLAN OF CARE
PROBLEM:  COPD Exacerbation     ASSESSMENT: Pt is A&Ox4. VSS, afebrile, maintaining O2 sats >94% on 2l, LARISSA CPAP, , tele, NSR/SB. Voids, tolerating diet no c/o n/v/d this shift. IV Sl, continuing IV abx and steroids.  Safety and fall precautions in place functional ability and stability  - Promote increasing activity/tolerance for mobility and gait  - Educate and engage patient/family in tolerated activity level and precautions  - Recommend use of  rollator for transfers and ambulation; aspen collar; spine

## 2019-06-13 NOTE — PROGRESS NOTES
[facility]  Progress Note    Arvind Cowan Patient Status:  Inpatient    1935 MRN [id number]   [facility] 5NW-A Attending Annalisa Garcia MD   Hosp Day # 3 PCP Jerome Donovan MD     Subjective:  Arvind Cowan is a(n) 81year old 13.2   NEPRELIM 7.18 6.43 4.39   WBC 8.9 7.7 7.6   .0 231.0 213.0     Recent Labs   Lab 06/11/19  0608 06/12/19  0652 06/12/19  1027 06/13/19  0641   *  --  139* 77   BUN 13  --  14 16   CREATSERUM 0.70  --  0.86 0.75   GFRAA 93  --  73 86

## 2019-06-13 NOTE — RESPIRATORY THERAPY NOTE
LARISSA - Equipment Use Daily Summary:  · Set Mode AFLEX  · Usage in hours: 6:45  · 90% Pressure (EPAP) level: 7.2  · 90% Insp Pressure (IPAP):   · AHI: 2.5  · Supplemental Oxygen:   · Comments:

## 2019-06-14 ENCOUNTER — APPOINTMENT (OUTPATIENT)
Dept: GENERAL RADIOLOGY | Facility: HOSPITAL | Age: 84
DRG: 190 | End: 2019-06-14
Attending: INTERNAL MEDICINE
Payer: MEDICARE

## 2019-06-14 PROCEDURE — 94640 AIRWAY INHALATION TREATMENT: CPT

## 2019-06-14 PROCEDURE — 80053 COMPREHEN METABOLIC PANEL: CPT | Performed by: FAMILY MEDICINE

## 2019-06-14 PROCEDURE — 85025 COMPLETE CBC W/AUTO DIFF WBC: CPT | Performed by: FAMILY MEDICINE

## 2019-06-14 PROCEDURE — 94668 MNPJ CHEST WALL SBSQ: CPT

## 2019-06-14 PROCEDURE — 71045 X-RAY EXAM CHEST 1 VIEW: CPT | Performed by: INTERNAL MEDICINE

## 2019-06-14 RX ORDER — POTASSIUM CHLORIDE 20 MEQ/1
40 TABLET, EXTENDED RELEASE ORAL EVERY 4 HOURS
Status: COMPLETED | OUTPATIENT
Start: 2019-06-14 | End: 2019-06-14

## 2019-06-14 RX ORDER — IPRATROPIUM BROMIDE AND ALBUTEROL SULFATE 2.5; .5 MG/3ML; MG/3ML
3 SOLUTION RESPIRATORY (INHALATION) EVERY 4 HOURS
Status: DISCONTINUED | OUTPATIENT
Start: 2019-06-14 | End: 2019-01-01

## 2019-06-14 RX ORDER — FAMOTIDINE 20 MG/1
20 TABLET ORAL DAILY
Status: DISCONTINUED | OUTPATIENT
Start: 2019-06-15 | End: 2019-01-01

## 2019-06-14 NOTE — RESPIRATORY THERAPY NOTE
Echocardiogram with Rizwan completed.  Dr. Gagnon to interpret.   LARISSA Equipment Usage Summary :            Set Mode :AUTO CPAP W FLEX          Usage in Hours:4;50          90% Pressure (EPAP) : 9.2           90% Insp Pressure (IPAP);           AHI : 7.2          Supplemental Oxygen :  2    LPM

## 2019-06-14 NOTE — PLAN OF CARE
PROBLEM:  COPD Exacerbation      ASSESSMENT: Pt is A&Ox4. VSS, afebrile, maintaining O2 sats >94% on 2l, LARISSA CPAP, , tele, NSR/SB. PRN ativan given for anxiety/sleep and prn tessalon given for cough. Voids, tolerating diet no c/o n/v/d this shift.  IV Sl Progressing     Problem: Impaired Functional Mobility  Goal: Achieve highest/safest level of mobility/gait  Description  Interventions:  - Assess patient's functional ability and stability  - Promote increasing activity/tolerance for mobility and gait  - E

## 2019-06-14 NOTE — PROGRESS NOTES
[facility]  Progress Note    [employer] Patient Status:  Inpatient    1935 MRN [id number]   [facility] 5NW-A Attending Yudi Mata MD   Hosp Day # 4 PCP Rosa Martino MD       SUBJECTIVE:  No CP, SOB, or N/V.   Feels well Nebulization Q8H [address]   guaiFENesin ER (MUCINEX) 12 hr tab 1,200 mg 1,200 mg Oral BID   predniSONE (DELTASONE) tab 60 mg 60 mg Oral Daily with breakfast   traZODone HCl (DESYREL) tab 50 mg 50 mg Oral Nightly   furosemide (LASIX) tab 20 mg 20 mg Oral Daily displaced fracture of first cervical vertebra ([facility]     Closed displaced fracture of first cervical vertebra, unspecified fracture morphology, [facility]     Hyperglycemia     Closed fracture of cervical vertebra, unspecified cervical vertebra

## 2019-06-14 NOTE — PROGRESS NOTES
[facility]  Progress Note    Chepe Cardoza Patient Status:  Inpatient    1935 MRN [id number]   [facility] 5NW-A Attending Eugenio Garner MD   [facility] Day # 4 PCP Shawnee Cerna MD     Subjective:  Chepe Cardoza is a(n) 81year old 06/11/19  0608 06/12/19  1027 06/13/19  0641   RBC 4.31 4.39 4.09   HGB 13.0 12.9 12.1   HCT 37.8 39.7 37.2   MCV 87.7 90.4 91.0   MCH 30.2 29.4 29.6   MCHC 34.4 32.5 32.5   RDW 13.1 13.3 13.2   NEPRELIM 7.18 6.43 4.39   WBC 8.9 7.7 7.6   .0 231.0 2

## 2019-06-14 NOTE — PROGRESS NOTES
Problem:  COPD Exacerbation     Data: Alert and oriented. Room air. LARISSA with CPAP. Tele. DOROTHEA Peters Loveless. Up stand by. Saline locked. PO steroids. Patient is wearing a cervical collar due to recent neck fracture.      Action: Keep patient comfortable and continu

## 2019-06-14 NOTE — PROGRESS NOTES
TOOK OVER CARE FOR PT. REPORT RECEIVED FROM [facility] C RN. PT RECEIVED AT BEDSIDE, AWAKE AND ALERT, CERVICAL COLLAR ON  ON ROOM AIR, DENIES ANY SYMPTOMS OR PAIN. VS STABLE. PT UPDATED WITH PLAN OF CARE.

## 2019-06-15 ENCOUNTER — APPOINTMENT (OUTPATIENT)
Dept: CT IMAGING | Facility: HOSPITAL | Age: 84
DRG: 190 | End: 2019-06-15
Attending: INTERNAL MEDICINE
Payer: MEDICARE

## 2019-06-15 ENCOUNTER — APPOINTMENT (OUTPATIENT)
Dept: GENERAL RADIOLOGY | Facility: HOSPITAL | Age: 84
DRG: 190 | End: 2019-06-15
Attending: INTERNAL MEDICINE
Payer: MEDICARE

## 2019-06-15 PROCEDURE — 94640 AIRWAY INHALATION TREATMENT: CPT

## 2019-06-15 PROCEDURE — 71275 CT ANGIOGRAPHY CHEST: CPT | Performed by: INTERNAL MEDICINE

## 2019-06-15 PROCEDURE — 80053 COMPREHEN METABOLIC PANEL: CPT | Performed by: FAMILY MEDICINE

## 2019-06-15 PROCEDURE — 84132 ASSAY OF SERUM POTASSIUM: CPT | Performed by: FAMILY MEDICINE

## 2019-06-15 PROCEDURE — 71045 X-RAY EXAM CHEST 1 VIEW: CPT | Performed by: INTERNAL MEDICINE

## 2019-06-15 PROCEDURE — 85025 COMPLETE CBC W/AUTO DIFF WBC: CPT | Performed by: FAMILY MEDICINE

## 2019-06-15 RX ORDER — SODIUM CHLORIDE 30 MG/ML INHALATION SOLUTION 30 MG/ML
3 SOLUTION INHALANT
Status: DISCONTINUED | OUTPATIENT
Start: 2019-06-15 | End: 2019-01-01

## 2019-06-15 RX ORDER — PREDNISONE 50 MG/1
50 TABLET ORAL
Status: DISCONTINUED | OUTPATIENT
Start: 2019-06-16 | End: 2019-01-01

## 2019-06-15 NOTE — PLAN OF CARE
She is on RA sating 96%. She gets Q4 duo and Q8 muco with the metaneb. She tolerates treatments well. BS are clear diminished with some coarseness. Will continue treatments and monitoring.

## 2019-06-15 NOTE — RESPIRATORY THERAPY NOTE
LARISSA - Equipment Use Daily Summary:  · Set Mode AFLEX  · Usage in hours: 8:10  · 90% Pressure (EPAP) level: 9.5  · 90% Insp Pressure (IPAP):   · AHI: 5.3  · Supplemental Oxygen:   · Comments:

## 2019-06-15 NOTE — PLAN OF CARE
Patient A/Ox4, complaints of pain post walk, medicated and pain reassessed. CPAP. Tele; NSR. Stand by assist. Cervical collar in place. Vitals stable. Will continue to monitor.      Problem: Patient/Family Goals  Goal: Patient/Family Long Term Goal  Descrip

## 2019-06-15 NOTE — PROGRESS NOTES
[facility]  Progress Note    Maame Adler Patient Status:  Inpatient    1935 MRN [id number]   [facility] 5NW-A Attending Sherlynn Cheadle, MD   Hosp Day # 5 PCP Renaldo Litten, MD     Subjective:  Maame Adler is a(n) 81year old 06/15/19  0659   RBC 4.09 4.97 4.87   HGB 12.1 14.7 14.6   HCT 37.2 45.1 44.1   MCV 91.0 90.7 90.6   MCH 29.6 29.6 30.0   MCHC 32.5 32.6 33.1   RDW 13.2 13.2 12.9   NEPRELIM 4.39 7.78* 5.25   WBC 7.6 10.7 8.8   .0 220.0 238.0     Recent Labs   Lab 0

## 2019-06-15 NOTE — RESPIRATORY THERAPY NOTE
Received patient on 2L through cpap machine. Breath sounds diminished, SPO2 98% PAtient receiving neb treatments Q4 hours, and mucomyst Q8 hours. Tolerating treatments well. Will continue to monitor.

## 2019-06-15 NOTE — BH PROGRESS NOTE
[facility]  Progress Note    Pal Rough Patient Status:  Inpatient    1935 MRN [id number]   [facility] 5NW-A Attending Joy Armstrong MD   Hosp Day # 4 PCP Maykel Ruiz MD       SUBJECTIVE:  No CP, SOB, or N/V.   Stable QID PRN   acetylcysteine (MUCOMYST) 20 % solution 2 mL 2 mL Nebulization Q8H [facility]   guaiFENesin ER (MUCINEX) 12 hr tab 1,200 mg 1,200 mg Oral BID   predniSONE (DELTASONE) tab 60 mg 60 mg Oral Daily with breakfast   traZODone HCl (DESYREL) tab 50 mg 50 mg Or first cervical vertebra ([facility]     Closed displaced fracture of first cervical vertebra, unspecified fracture morphology, initial encounter ([facility]     Hyperglycemia     Closed fracture of cervical vertebra, unspecified cervical vertebral level, subsequent en

## 2019-06-15 NOTE — PROGRESS NOTES
[facility] Pharm[facility] Note:  Renal Dose Adjustment    Heriberto Romo has been prescribed famotidine (PEPCID) 20 mg orally every 12 hours. Estimated Creatinine Clearance: 46.5 mL/min (based on SCr of 0.84 mg/dL).     Her calculated creatinine clearance is < 50 ml/mi

## 2019-06-15 NOTE — PLAN OF CARE
Problem: Patient/Family Goals  Goal: Patient/Family Long Term Goal  Description  Patient's Long Term Goal: To discharge home with adequate resources     Interventions:  - comply with POC   - See additional Care Plan goals for specific interventions   Out Outcome: Progressing        Pt is A+Ox4. Cervical collar in place  d/t C1 fx. VSS on RA. LARISSA/CPAP. Nebs. Prednisone. NSR on tele. Lovenox. Voids. Norco x back/neck pain. Up with SBA and rolling walker. CTA chest pending. Pt and family updated on POC.  WCT

## 2019-06-16 ENCOUNTER — APPOINTMENT (OUTPATIENT)
Dept: GENERAL RADIOLOGY | Facility: HOSPITAL | Age: 84
DRG: 190 | End: 2019-06-16
Attending: INTERNAL MEDICINE
Payer: MEDICARE

## 2019-06-16 PROCEDURE — 94640 AIRWAY INHALATION TREATMENT: CPT

## 2019-06-16 PROCEDURE — 71045 X-RAY EXAM CHEST 1 VIEW: CPT | Performed by: INTERNAL MEDICINE

## 2019-06-16 PROCEDURE — 97530 THERAPEUTIC ACTIVITIES: CPT

## 2019-06-16 PROCEDURE — 80053 COMPREHEN METABOLIC PANEL: CPT | Performed by: FAMILY MEDICINE

## 2019-06-16 PROCEDURE — 97116 GAIT TRAINING THERAPY: CPT

## 2019-06-16 PROCEDURE — 85025 COMPLETE CBC W/AUTO DIFF WBC: CPT | Performed by: FAMILY MEDICINE

## 2019-06-16 RX ORDER — POTASSIUM CHLORIDE 20 MEQ/1
40 TABLET, EXTENDED RELEASE ORAL ONCE
Status: COMPLETED | OUTPATIENT
Start: 2019-06-16 | End: 2019-06-16

## 2019-06-16 NOTE — PLAN OF CARE
Assumed patient care at 0730. Vital signs stable. Patient alert and oriented x 4. Patient complaining of headache - PRN meds given per MAR. Still with expiratory wheeze. C collar in place.       Problem: Patient/Family Goals  Goal: Patient/Family Long stability  - Promote increasing activity/tolerance for mobility and gait  - Educate and engage patient/family in tolerated activity level and precautions  - Recommend use of  rollator for transfers and ambulation; aspen collar; spine precautions   Outcome:

## 2019-06-16 NOTE — RESPIRATORY THERAPY NOTE
Received pt on room air. Metaneb Q4 duo, Q8 Muco, and TID NaCl. BS are diminished. Pt has a strong non-productive cough. CPAP at night. Continue to monitor.

## 2019-06-16 NOTE — PHYSICAL THERAPY NOTE
PHYSICAL THERAPY TREATMENT NOTE - INPATIENT    Room Number: 524/524-A     Session: 1   Number of Visits to Meet Established Goals: 4    Presenting Problem: COPD exacerbation    History related to current admission: Pt is 81year old female admitted on 6/1 Magnolia hCatterjee MD at [address] [place] [address] - LEFT N/A 9/17/2014    Performed by Magnolia Chatterjee MD at [facility]   • LUMBAR FACET INJECTION Right 6/22/2015    Performed by Adenike Bragg MD at [address] (including a wheelchair)?: A Little   -   Need to walk in hospital room?: A Little   -   Climbing 3-5 steps with a railing?: A Lot       AM-PAC Score:  Raw Score: 19   Approx Degree of Impairment: 41.77%   Standardized Score (AM-PAC Scale): 45.44   CMS Mod activity  - If you reach beyond 7/10, it is important to rest; stop activity and if you need to sit down to recover. Patient End of Session: Up in chair;Needs met;Call light within reach;RN aware of session/findings; All patient questions and concerns a

## 2019-06-16 NOTE — PROGRESS NOTES
[facility]  Progress Note    Lakeisha Merida Patient Status:  Inpatient    1935 MRN [id number]   [facility] 5NW-A Attending Yudi Mata MD   [facility] Day # 6 PCP Rosa Martino MD     Subjective:  Lakeisha Merida is a(n) 81year old HGB 14.7 14.6 13.3   HCT 45.1 44.1 39.7   MCV 90.7 90.6 88.8   MCH 29.6 30.0 29.8   MCHC 32.6 33.1 33.5   RDW 13.2 12.9 12.8   NEPRELIM 7.78* 5.25 4.74   WBC 10.7 8.8 8.3   .0 238.0 263.0     Recent Labs   Lab 06/14/19  1313 06/15/19  0009 06/15/1

## 2019-06-16 NOTE — PLAN OF CARE
Patient A/Ox4, no complaints of pain. CPAP. Tele; NSR. Stand by assist. Cervical collar in place. Vitals stable. Will continue to monitor.        Problem: Patient/Family Goals  Goal: Patient/Family Long Term Goal  Description  Patient's Long Term Goal: To d

## 2019-06-16 NOTE — RESPIRATORY THERAPY NOTE
LARISSA - Equipment Use Daily Summary:  · Set Mode   · Usage in hours: 6.6  · 90% Pressure (EPAP) level:   · 90% Insp Pressure (IPAP): 6.5  · AHI:   · Supplemental Oxygen:  · Comments:

## 2019-06-16 NOTE — PROGRESS NOTES
[facility]  Progress Note    Guy Rodriguez Patient Status:  Inpatient    1935 MRN [id number]   [facility] 5NW-A Attending Chad Meyer MD   Hosp Day # 6 PCP Oleg Montilla MD       SUBJECTIVE:  Sob  Neck pain   Worse       OB Alum & Mag Hydroxide-Simeth (MAALOX) oral suspension 30 mL 30 mL Oral QID PRN   acetylcysteine (MUCOMYST) 20 % solution 2 mL 2 mL Nebulization Q8H [facility]   guaiFENesin ER (MUCINEX) 12 hr tab 1,200 mg 1,200 mg Oral BID   traZODone HCl (DESYREL) tab 50 mg 50 of first cervical vertebra ([facility]     Closed displaced fracture of first cervical vertebra, unspecified fracture morphology, [facility]     Hyperglycemia     Closed fracture of cervical vertebra, unspecified cervical vertebral level, subsequent

## 2019-06-17 ENCOUNTER — APPOINTMENT (OUTPATIENT)
Dept: GENERAL RADIOLOGY | Facility: HOSPITAL | Age: 84
DRG: 190 | End: 2019-06-17
Attending: INTERNAL MEDICINE
Payer: MEDICARE

## 2019-06-17 PROCEDURE — 94640 AIRWAY INHALATION TREATMENT: CPT

## 2019-06-17 PROCEDURE — 84132 ASSAY OF SERUM POTASSIUM: CPT | Performed by: FAMILY MEDICINE

## 2019-06-17 PROCEDURE — 71045 X-RAY EXAM CHEST 1 VIEW: CPT | Performed by: INTERNAL MEDICINE

## 2019-06-17 PROCEDURE — 0BC68ZZ EXTIRPATION OF MATTER FROM RIGHT LOWER LOBE BRONCHUS, VIA NATURAL OR ARTIFICIAL OPENING ENDOSCOPIC: ICD-10-PCS | Performed by: INTERNAL MEDICINE

## 2019-06-17 PROCEDURE — 99153 MOD SED SAME PHYS/QHP EA: CPT | Performed by: INTERNAL MEDICINE

## 2019-06-17 PROCEDURE — 94660 CPAP INITIATION&MGMT: CPT

## 2019-06-17 PROCEDURE — 0BC38ZZ EXTIRPATION OF MATTER FROM RIGHT MAIN BRONCHUS, VIA NATURAL OR ARTIFICIAL OPENING ENDOSCOPIC: ICD-10-PCS | Performed by: INTERNAL MEDICINE

## 2019-06-17 PROCEDURE — 0BC58ZZ EXTIRPATION OF MATTER FROM RIGHT MIDDLE LOBE BRONCHUS, VIA NATURAL OR ARTIFICIAL OPENING ENDOSCOPIC: ICD-10-PCS | Performed by: INTERNAL MEDICINE

## 2019-06-17 PROCEDURE — 99152 MOD SED SAME PHYS/QHP 5/>YRS: CPT | Performed by: INTERNAL MEDICINE

## 2019-06-17 RX ORDER — ONDANSETRON 2 MG/ML
4 INJECTION INTRAMUSCULAR; INTRAVENOUS ONCE AS NEEDED
Status: DISCONTINUED | OUTPATIENT
Start: 2019-06-17 | End: 2019-06-17 | Stop reason: HOSPADM

## 2019-06-17 RX ORDER — MIDAZOLAM HYDROCHLORIDE 1 MG/ML
INJECTION INTRAMUSCULAR; INTRAVENOUS
Status: DISCONTINUED | OUTPATIENT
Start: 2019-06-17 | End: 2019-06-17 | Stop reason: HOSPADM

## 2019-06-17 RX ORDER — LIDOCAINE HYDROCHLORIDE 20 MG/ML
1 INJECTION, SOLUTION INFILTRATION; PERINEURAL ONCE
Status: COMPLETED | OUTPATIENT
Start: 2019-06-17 | End: 2019-06-17

## 2019-06-17 RX ORDER — IPRATROPIUM BROMIDE AND ALBUTEROL SULFATE 2.5; .5 MG/3ML; MG/3ML
3 SOLUTION RESPIRATORY (INHALATION) AS NEEDED
Status: DISCONTINUED | OUTPATIENT
Start: 2019-06-17 | End: 2019-06-17 | Stop reason: HOSPADM

## 2019-06-17 NOTE — OPERATIVE REPORT
[address] [facility] Patient Status:  Inpatient    1935 MRN [id number]   [facility] ENDOSCOPY Attending Joy Armstrong MD   [facility] Day # 7 PCP Maykel Ruiz MD     OPERATIVE REPORT:     DATE OF OPERATION: 19 anesthesia administered, and topical lidocaine given for local anesthesia.      First, the conventional videobronchoscope was used and inserted orally through the bite block.  The upper airways appeared normal. The vocal cords were insufficiently evaluated feasible.     Goran Richey MD  [facility] [place] [facility] [place]/ [facility]

## 2019-06-17 NOTE — PROGRESS NOTES
[facility]  Progress Note    Durene Linear Patient Status:  Inpatient    1935 MRN [id number]   [facility] 5NW-A Attending Augusto Horn MD   Hosp Day # 7 PCP Yuli Diaz MD       SUBJECTIVE:  No CP,   Still sob  Neck paon 6:39.     INDICATIONS:  Bronchomalacia, mucous plugging, volume loss of the right lung     PATIENT STATED HISTORY: (As transcribed by Technologist)  Patient offered no additional history at this time.           Impression:     CONCLUSION:  Volume loss righ atelectasis or consolidation in the right lower lobe. Tilmon Michael has been interval improvement however in the airspace disease in the posterior aspect of the right upper lobe compared to the previous   CT. MEDIASTINUM: Tilmon Michael is a large hiatus hernia.   ROSAMARIA: (MUCOMYST) 20 % solution 2 mL 2 mL Nebulization Q8H [facility]   guaiFENesin ER (MUCINEX) 12 hr tab 1,200 mg 1,200 mg Oral BID   traZODone HCl (DESYREL) tab 50 mg 50 mg Oral Nightly   furosemide (LASIX) tab 20 mg 20 mg Oral Daily   albuterol Sulfate (VENTOLIN) (5 unspecified fracture morphology, initial encounter ([facility]     Hyperglycemia     Closed fracture of cervical vertebra, unspecified cervical vertebral level, subsequent encounter        Plan:   1  Copd exacerbation  2  Pneumonia  3  C1 fracture  4  Noncomplia

## 2019-06-17 NOTE — PLAN OF CARE
Problem: Patient/Family Goals  Goal: Patient/Family Long Term Goal  Description  Patient's Long Term Goal: To discharge home with adequate resources     Interventions:  - comply with POC   - See additional Care Plan goals for specific interventions   Out patient/family in tolerated activity level and precautions  - Recommend use of  rollator for transfers and ambulation; aspen collar; spine precautions   Outcome: Progressing         Pt aox4, maintaining O2 sats on RA. VSS. Denies pain. Tele-NSR.  Voids, up

## 2019-06-17 NOTE — PROGRESS NOTES
[facility]  Progress Note    Jeniffer Scanlon Patient Status:  Inpatient    1935 MRN [id number]   [facility] 5NW-A Attending Joshua MD Hunter   [facility] Day # 7 PCP Aminta Yeung MD     Subjective:  Jeniffer Scanlon is a(n) 81year old 14.7 14.6 13.3   HCT 45.1 44.1 39.7   MCV 90.7 90.6 88.8   MCH 29.6 30.0 29.8   MCHC 32.6 33.1 33.5   RDW 13.2 12.9 12.8   NEPRELIM 7.78* 5.25 4.74   WBC 10.7 8.8 8.3   .0 238.0 263.0     Recent Labs   Lab 06/14/19  1313  06/15/19  0659 06/16/19  07

## 2019-06-17 NOTE — PROGRESS NOTES
06/17/19 1454   Clinical Encounter Type   Visited With Patient   Patient's Supportive Strategies/Resources Father Marya Beckford provided prayer, Scripture, support and Sacrament of the Sick.     Sacramental Encounters   Sacrament of Sick-Anointing Anointed

## 2019-06-17 NOTE — RESPIRATORY THERAPY NOTE
Received pt on cpap. Nebs done in line per pt request. She wanted to keep sleeping for a bit. Q4 duo, Q8 muco and TID NaCl. BS expiratory wheezing. Continue to monitor.

## 2019-06-17 NOTE — PLAN OF CARE
Problem: Patient/Family Goals  Goal: Patient/Family Long Term Goal  Description  Patient's Long Term Goal: To discharge home with adequate resources     Interventions:  - comply with POC   - See additional Care Plan goals for specific interventions   6/1 Progressing     Problem: Impaired Functional Mobility  Goal: Achieve highest/safest level of mobility/gait  Description  Interventions:  - Assess patient's functional ability and stability  - Promote increasing activity/tolerance for mobility and gait  - E

## 2019-06-17 NOTE — RESPIRATORY THERAPY NOTE
LARISSA Equipment Usage Summary :            Set Mode :AUTO CPAP W FLEX          Usage in Hours:2;31          90% Pressure (EPAP) : 7           90% Insp Pressure (IPAP);           AHI : 5.6          Supplemental Oxygen : 2   LPM

## 2019-06-18 ENCOUNTER — APPOINTMENT (OUTPATIENT)
Dept: GENERAL RADIOLOGY | Facility: HOSPITAL | Age: 84
DRG: 190 | End: 2019-06-18
Attending: INTERNAL MEDICINE
Payer: MEDICARE

## 2019-06-18 PROCEDURE — 97116 GAIT TRAINING THERAPY: CPT

## 2019-06-18 PROCEDURE — 94640 AIRWAY INHALATION TREATMENT: CPT

## 2019-06-18 PROCEDURE — 80053 COMPREHEN METABOLIC PANEL: CPT | Performed by: INTERNAL MEDICINE

## 2019-06-18 PROCEDURE — 85025 COMPLETE CBC W/AUTO DIFF WBC: CPT | Performed by: INTERNAL MEDICINE

## 2019-06-18 PROCEDURE — 94668 MNPJ CHEST WALL SBSQ: CPT

## 2019-06-18 PROCEDURE — 94664 DEMO&/EVAL PT USE INHALER: CPT

## 2019-06-18 PROCEDURE — 71045 X-RAY EXAM CHEST 1 VIEW: CPT | Performed by: INTERNAL MEDICINE

## 2019-06-18 PROCEDURE — 97530 THERAPEUTIC ACTIVITIES: CPT

## 2019-06-18 RX ORDER — PAROXETINE HYDROCHLORIDE 20 MG/1
40 TABLET, FILM COATED ORAL DAILY
Qty: 30 TABLET | Refills: 5 | Status: SHIPPED | OUTPATIENT
Start: 2019-01-01 | End: 2020-01-01 | Stop reason: ALTCHOICE

## 2019-06-18 RX ORDER — SODIUM CHLORIDE 30 MG/ML INHALATION SOLUTION 30 MG/ML
SOLUTION INHALANT
Qty: 180 VIAL | Refills: 1 | Status: SHIPPED | OUTPATIENT
Start: 2019-06-18 | End: 2020-01-01

## 2019-06-18 RX ORDER — PREDNISONE 10 MG/1
TABLET ORAL
Qty: 30 TABLET | Refills: 0 | Status: SHIPPED | OUTPATIENT
Start: 2019-06-18 | End: 2019-01-01 | Stop reason: ALTCHOICE

## 2019-06-18 RX ORDER — TRAZODONE HYDROCHLORIDE 50 MG/1
50 TABLET ORAL NIGHTLY
Qty: 30 TABLET | Refills: 2 | Status: SHIPPED | OUTPATIENT
Start: 2019-06-18 | End: 2019-01-01 | Stop reason: ALTCHOICE

## 2019-06-18 NOTE — RESPIRATORY THERAPY NOTE
LARISSA Equipment Usage Summary :            Set Mode :AUTO CPAP W FLEX          Usage in Hours:5;35          90% Pressure (EPAP) : 7.9           90% Insp Pressure (IPAP);           AHI : 16          Supplemental Oxygen :   2   LPM

## 2019-06-18 NOTE — PLAN OF CARE
Problem: Patient/Family Goals  Goal: Patient/Family Long Term Goal  Description  Patient's Long Term Goal: To discharge home with adequate resources     Interventions:  - comply with POC   - See additional Care Plan goals for specific interventions   Out mobility and gait  - Educate and engage patient/family in tolerated activity level and precautions  - Recommend use of  rollator for transfers and ambulation; aspen collar; spine precautions   Outcome: Progressing     See flowsheets. Received pt AOx4.  Pt d

## 2019-06-18 NOTE — PLAN OF CARE
Pt AOx4. VSS on RA. Oral steroids. LARISSA/CPAP @ NOC. Tele, NSR. Lovenox. E.p. Voids. No c.o pain. Up SBA with walker. Saline locked. Cardiac diet. Pt updated on poc, wctm. Multidisciplinary Discharge Rounds held 6/18/2019.     Treatment team members pre RESPIRATORY - ADULT  Goal: Achieves optimal ventilation and oxygenation  Description  INTERVENTIONS:  - Assess for changes in respiratory status  - Assess for changes in mentation and behavior  - Position to facilitate oxygenation and minimize respiratory

## 2019-06-18 NOTE — PROGRESS NOTES
[facility]  Progress Note    Prieto Boo Patient Status:  Inpatient    1935 MRN [id number]   [facility] 5NW-A Attending Jacqueline Horton MD   Hosp Day # 8 PCP Antwon Harvey MD       SUBJECTIVE:  No CP, SOB, or N/V.   Plan for b Oral Daily   albuterol Sulfate (VENTOLIN) (5 MG/ML) 0.5% nebulizer solution 10 mg 10 mg Nebulization Continuous   Fluticasone Furoate-Vilanterol (BREO ELLIPTA) 100-25 MCG/INH inhaler 1 puff 1 puff Inhalation Daily   benzonatate (TESSALON) cap 200 mg 200 mg  Pneumonia  3  C1 fracture  4  Noncompliance with meds and brace  5  Anxiety  6  Bronchial atresia / cpap  7  htn / dyslipid  8  Mucous plugging     Right lung appears full collapse   Ct shows resolving infiltrate but colapse of right lower lobe lung  Neck

## 2019-06-18 NOTE — PROGRESS NOTES
[facility]  Progress Note    Kaiden Mccrary Patient Status:  Inpatient    1935 MRN [id number]   [facility] 5NW-A Attending Austin Shaffer MD   [facility] Day # 8 PCP Maci Castrejon MD     Subjective:  Kaiden Mccrary is a(n) 81year old 06/16/19  0700 06/18/19  0713   RBC 4.87 4.47 4.98   HGB 14.6 13.3 14.7   HCT 44.1 39.7 44.7   MCV 90.6 88.8 89.8   MCH 30.0 29.8 29.5   MCHC 33.1 33.5 32.9   RDW 12.9 12.8 13.2   NEPRELIM 5.25 4.74 6.14   WBC 8.8 8.3 9.9   .0 263.0 272.0     Recent PO regimen  Encouraged ambulation/ OOBTC as tolerated  May be discharged home from pulmonary standpoint - she will f/u Dr. Jordan Hernandez as outpatient    MD AARON Schaefer  06/18/19    Addendum  Discussed with Dr Iveth Paige , chart reviewed     CPT vest at

## 2019-06-18 NOTE — PHYSICAL THERAPY NOTE
PHYSICAL THERAPY TREATMENT NOTE - INPATIENT    Room Number: 524/524-A     Session: 2     Number of Visits to Meet Established Goals: 4    Presenting Problem: COPD exacerbation    History related to current admission: Pt is 81year old female admitted on 6 • HERNIA SURGERY  05/18/16   • HERNIA VENTRAL REPAIR N/A 5/18/2016    Performed by Karen Brewer MD at [address] [place] [address] - LEFT N/A 9/17/2014    Performed by Karen Brewer MD at [facility] MAIN OR   • LUMBAR FACET INJECTION Ri A Little   How much help from another person does the patient currently need. ..   -   Moving to and from a bed to a chair (including a wheelchair)?: A Little   -   Need to walk in hospital room?: A Little   -   Climbing 3-5 steps with a railing?: A Little from ongoing IP PT to maximize functional independence. Pt able to recall 2/3 spine precautions and reports compliance with activity recs.    The AM-PAC '6-Clicks' Inpatient Basic Mobility Short Form was completed and this patient is demonstrating a 35.83%

## 2019-06-18 NOTE — PROGRESS NOTES
[facility]  Progress Note    Zackary Merlin Patient Status:  Inpatient    1935 MRN [id number]   [facility] 5NW-A Attending Princess Darron MD   Hosp Day # 8 PCP Dexter Leal MD       SUBJECTIVE:  No CP, SOB, or N/V.   Feels im PREOPERATIVE DIAGNOSIS(ES): mucus plugging, right lower lobe atelectasis, bronchomalacia     POSTOPERATIVE DIAGNOSIS(ES): mucus plugging of the right main stem, bronchus intermedius and right lower lobe bronchi; bronchomalacia of the left main stem, rig First, the conventional videobronchoscope was used and inserted orally through the bite block. The upper airways appeared normal. The vocal cords were insufficiently evaluated due to the level of sedation but appeared normal and approximated well.  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

## 2019-06-18 NOTE — CM/SW NOTE
Dr Saleem Mcghee approached  regarding facilitation of CPT vest upon discharge. The following must be included in documentation for Medicare qualification/coverage of CPT vest at home.     Daily productive cough for at least 6 continuous months documented in a

## 2019-06-19 NOTE — PLAN OF CARE
Pt AOx4. VSS on RA. Oral steroids. LARISSA/CPAP @ NOC. Tele, NSR. Lovenox. E.p. Voids. C.o knee pain, norco given. Up SBA with walker. Saline locked. Cardiac diet. Pt updated on poc, wctm. Multidisciplinary Discharge Rounds held 6/19/2019.     Treatment team interventions               Outcome: Progressing     Problem: RESPIRATORY - ADULT  Goal: Achieves optimal ventilation and oxygenation  Description  INTERVENTIONS:  - Assess for changes in respiratory status  - Assess for changes in mentation and behavior

## 2019-06-19 NOTE — PROGRESS NOTES
[facility]  Progress Note    Anderson Buerger Patient Status:  Inpatient    1935 MRN [id number]   [facility] 5NW-A Attending China Irving MD   Hosp Day # 9 PCP Eboni George MD        Assessment and Plan:     1.  COPD exacer pain     Primary osteoarthritis of right knee     Acute right-sided low back pain without sciatica     Hyponatremia     Hypokalemia     COPD exacerbation (HCC)     Hypoxia     Bronchiectasis (HCC)     Lung abnormality     Closed displaced fracture of first 18 18   CREATSERUM 0.84  --  0.72 0.76  --  0.90 0.82   GFRAA 75  --  90 84  --  69 77   GFRNAA 65  --  78 73  --  60 67   CA 8.7  --  9.0 8.8  --  8.9 8.9   ALB 3.2*  --  3.0* 3.1*  --  3.4 3.0*     --  138 138  --  136 140   K 3.6   < > 4.2 3.7 3.9 mg Oral Daily   Fluticasone Propionate (FLONASE) 50 MCG/ACT nasal spray 1 spray 1 spray Each Nare Daily   HYDROcodone-acetaminophen (NORCO) 5-325 MG per tab 1 tablet 1 tablet Oral Q4H PRN   LORazepam (ATIVAN) tab 0.5 mg 0.5 mg Oral Q6H PRN   atorvastatin (

## 2019-06-19 NOTE — CM/SW NOTE
Signed and completed CPT vest order faxed to [address] [place] [address], Lan Appiah (N:[phone].)  Pt approved for CPT vest. Pt instructed to contact [address], contact number provided on discharge instructions, when she arrives home and respiratory staff will

## 2019-06-19 NOTE — CDS QUERY
Devyn Michaels   1935     DOS 06/10/19 to current     [facility] [id number]    [id number]  Uncertain Diagnosis  CLINICAL DOCUMENTATION CLARIFICATION FORM  Dear Doctor Benjamin Wiseman information (provided below) indicates an unknown status of a documented 7.6 06/12/19  7.7 06/11/19  8.9 06/10/19  7.0     06/10/19 cxr “…Lung volumes are large which may reflect COPD. No new consolidation or pleural effusion.   06/12/19 cxr “…CONCLUSION:   Worsening right lung consolidation the right lower lobe and right upper l

## 2019-06-19 NOTE — DIETARY NOTE
[address] LALY Del Angel     Admitting diagnosis:  COPD exacerbation ([facility] [J44.1]  Closed fracture of cervical vertebra, unspecified cervical vertebral level, subsequent encounter [S12. 9XXD]    Ht: 165.1 cm (5' 5\")  Wt: 77.3 k

## 2019-06-19 NOTE — PLAN OF CARE
Pt on Cpap Nasal Mask- 2lpm O2  Sat 96  Rr 24  BS= clr/dim  Neb duo+ Muco given  msg to pharmacy for 3% saline to send

## 2019-06-19 NOTE — PROGRESS NOTES
NURSING DISCHARGE NOTE    Discharged Home via Wheelchair. Accompanied by Family member  Belongings Taken by patient/family. Pt assessed and ready for dc. Iv dc'd. Instructions gone over with pt and dtr.  Pt instructed to call for cpt vest when she g

## 2019-06-19 NOTE — RESPIRATORY THERAPY NOTE
LRAISSA - Equipment Use Daily Summary:  · Set Mode   · Usage in hours: 8.8  · 90% Pressure (EPAP) level:   · 90% Insp Pressure (IPAP): 6.5  · AHI:   · Supplemental Oxygen:  · Comments:

## 2019-06-19 NOTE — PLAN OF CARE
Problem: Patient/Family Goals  Goal: Patient/Family Long Term Goal  Description  Patient's Long Term Goal: To discharge home with adequate resources     Interventions:  - comply with POC   - See additional Care Plan goals for specific interventions   Out activity/tolerance for mobility and gait  - Educate and engage patient/family in tolerated activity level and precautions  - Recommend use of  rollator for transfers and ambulation; aspen collar; spine precautions   Outcome: Progressing       Here for COPD

## 2019-06-19 NOTE — PLAN OF CARE
Problem: Patient/Family Goals  Goal: Patient/Family Long Term Goal  Description  Patient's Long Term Goal: To discharge home with adequate resources     Interventions:  - comply with POC   - See additional Care Plan goals for specific interventions   6/1 retention  6/19/2019 1441 by Linnea Banks RN  Outcome: Adequate for Discharge  6/19/2019 1103 by Linnea Banks RN  Outcome: Progressing     Problem: Impaired Functional Mobility  Goal: Achieve highest/safest level of mobility/gait  Description  Interventi

## 2019-06-20 NOTE — CM/SW NOTE
Patient discharged on 6/19/19 as previously planned.        06/20/19 0800   Discharge disposition   Expected discharge disposition Home-Health   Name of [facility] Proc[facility] Kamlesh Conway 1 services after discharge DME;Skilled home care   HME prov

## 2019-06-20 NOTE — PROGRESS NOTES
[facility]  Progress Note    Kamille Meadows Patient Status:  Inpatient    1935 MRN [id number]   [facility] 5NW-A Attending No att. providers found   [address] Day # 9 PCP Faviola Fang MD       SUBJECTIVE:  No CP, SOB, or N/V.   Stable effusion     Pericarditis     At risk for falling     Spondylosis of lumbar region without myelopathy or radiculopathy     Primary osteoarthritis of both knees     CAP (community acquired pneumonia)     COPD [facility]chronic obstructive [facility] disease) [facility]

## 2019-07-02 NOTE — DISCHARGE SUMMARY
[facility]  Discharge Summary    Valdo Hill Patient Status:  Inpatient    1935 MRN [id number]   [facility] 5NW-A Attending No att. providers found   [address] Day # 9 PCP Krissy Rodriguez MD     Date of Admission: 6/10/2019  6:40 Health Care Services    Discharge Condition: Good    Discharge Medications: Discharge Medication List as of 6/19/2019  2:48 PM    START taking these medications    predniSONE 10 MG Oral Tab  take 4 tablets daily for 3 days then 3 tablets daily for 3 days t every 6 (six) hours as needed for Anxiety., Normal, Disp-100 tablet, R-1    CARTIA  MG Oral Capsule SR 24 Hr  Take 120 mg by mouth daily.   , Historical, R-11, NAVJOT    HYDROcodone-acetaminophen 5-325 MG Oral Tab  Take 1 tablet by mouth every 4 (four) h

## 2019-07-05 NOTE — TELEPHONE ENCOUNTER
Pt saw physician in ED currently is wearing next brace. Pt states neck brace is hot and annoying. Would like to know can she remove brace?  Pt scheduled for f/u 7/22/19

## 2019-07-05 NOTE — TELEPHONE ENCOUNTER
Spoke to patient regarding brace. Instructed to continue wearing brace as directed until seen by Dr. Loy Winter for upcoming post - op hospital f/u. Patient very appreciative for phone call and agreed to continue wearing brace.

## 2019-07-22 NOTE — PROGRESS NOTES
Pt is here for hospital follow up for C1 Fracture. Imaging (Xray of the Cervical) Obtained. Brace: Daily/    Pt states that pain is better since LOV.

## 2019-07-22 NOTE — TELEPHONE ENCOUNTER
Xray reviewed with Dr. Lazara Arango. It looks stable. She is OK to get out of her collar and does not need to see us again unless she develops issues.

## 2019-07-22 NOTE — PROGRESS NOTES
Neurosurgery Clinic Visit  2019    Taisha Gastelum PCP:  Michael Levi MD    1935 MRN [id number]       CC:  S/P C1 fx    HPI:    Sandra Hancock is here for 6 week f/u after hospital visit for atraumatic C1 fx.   She had been having pain almost a month bef • BRONCHOSCOPY N/A 4/30/2019     Performed by Jacob Schaefer MD at [facility] [place] (ERCP) N/A 11/14/2014     Performed by Mari Alexander MD at [facility] ENDOSCOPY Motor: R D 4+, otherwise5/5 x 4                Sensation: intact to light touch bilaterally                Reflexes: BUE 2+, BLE 1+, no clonus, no almaraz's                Coordination: deferred                 Gait: deferred     Cervical ROM intact with

## 2019-07-22 NOTE — TELEPHONE ENCOUNTER
07/22/19 Xray cervical spine  CONCLUSION:    1. No significant change with flexion and extension maneuvers of known C1 anterior arch fracture and anterolisthesis at the C3-4 level.   2. Slight anterolisthesis at the C2-3 level on flexion view, correlate cli

## 2019-07-23 PROBLEM — Z01.419 WELL WOMAN EXAM WITH ROUTINE GYNECOLOGICAL EXAM: Status: ACTIVE | Noted: 2019-01-01

## 2019-07-23 NOTE — PROGRESS NOTES
She is not diabetic she has had vaginal itching for a month. She bleeds because of scratching. No recent antibiotics. She recently broke her neck. She thinks she is on medicine for osteoporosis.   General genitalia the whole vulvar is red there are exco

## 2019-07-25 NOTE — TELEPHONE ENCOUNTER
Patient instructed to try cotton underwear and continue using her medication as prescribed. Patient verbalizes understanding.

## 2019-07-25 NOTE — TELEPHONE ENCOUNTER
Patient states she was just in for an appt and she feels as though the medication isnt working as well as she had hoped. Also she would like a recommendation for a different pad to wear, the one she is currently using it uncomfortable.  Please advise

## 2019-07-26 NOTE — TELEPHONE ENCOUNTER
Patient states that she took ( Diflucan ) that doctor prescribed on Wednesday. She noticed some itching all over but as instructed by doctor she waited 72 hrs and then took another pill today. States the itching is all over her body and she has a rash.  Tracy Rios

## 2019-07-26 NOTE — TELEPHONE ENCOUNTER
----- Message from Chetan Min sent at 7/26/2019  3:04 PM CDT -----  Contact: self  Pt calling back from discussion with nurse yesterday and states medication isnt helping her problem

## 2019-08-01 NOTE — TELEPHONE ENCOUNTER
Patient informed that Dr Serenity Carroll would need to see her before prescribing more medication. Verbalized understanding. Appointment scheduled for 8/9/19.

## 2019-08-01 NOTE — TELEPHONE ENCOUNTER
Per pt she called to see if it would be possible to get another refill for the cream she got about a week ago from dr DAVIS.  She says that the tube is almost gone, that maybe she used more than normal because it is so hard to reach (I don't think she was cert

## 2019-08-09 NOTE — TELEPHONE ENCOUNTER
Patient would like to know what soaps are safe to use so she can prevent the problem she keeps on having.  Please advise

## 2019-08-09 NOTE — PROGRESS NOTES
She ran out of the steroid cream she is complaining of external itching. Vagina is atrophic without discharge. No dysuria no bleeding. Continue the steroid cream.  She also has evidence of yeast infection around the bellybutton this was discussed.   Need

## 2019-08-12 NOTE — TELEPHONE ENCOUNTER
Patient states that the medication she is using (Terazol and Clotrimazole) are not helping the external vaginal itching. Patient wants to know what else can she use. Will check with Dr. Serenity Carroll and call patient back.

## 2019-08-13 NOTE — TELEPHONE ENCOUNTER
Patient called back and informed  that a script for betamethasone ointment will be sent to her pharmacy. Verbalized understanding

## 2019-08-13 NOTE — TELEPHONE ENCOUNTER
Patient called and wanted to know what else she could use for the vaginal dryness and itching. The medications she has are not working. Will consult with Dr Erick Almanza and call back

## 2019-09-05 PROBLEM — S12.000A CLOSED DISPLACED FRACTURE OF FIRST CERVICAL VERTEBRA, UNSPECIFIED FRACTURE MORPHOLOGY, INITIAL ENCOUNTER (HCC): Status: RESOLVED | Noted: 2019-06-06 | Resolved: 2019-01-01

## 2019-09-05 PROBLEM — S12.9XXD CLOSED FRACTURE OF CERVICAL VERTEBRA, UNSPECIFIED CERVICAL VERTEBRAL LEVEL, SUBSEQUENT ENCOUNTER: Status: RESOLVED | Noted: 2019-06-10 | Resolved: 2019-01-01

## 2019-09-05 PROBLEM — R60.0 BILATERAL LEG EDEMA: Status: ACTIVE | Noted: 2019-01-01

## 2019-09-05 PROBLEM — E87.1 HYPONATREMIA: Status: RESOLVED | Noted: 2018-11-19 | Resolved: 2019-01-01

## 2019-09-05 PROBLEM — J44.1 COPD EXACERBATION (HCC): Status: RESOLVED | Noted: 2019-02-01 | Resolved: 2019-01-01

## 2019-09-05 PROBLEM — Z01.419 WELL WOMAN EXAM WITH ROUTINE GYNECOLOGICAL EXAM: Status: RESOLVED | Noted: 2019-01-01 | Resolved: 2019-01-01

## 2019-09-05 PROBLEM — J44.1 COPD EXACERBATION (HCC): Status: ACTIVE | Noted: 2019-01-01

## 2019-09-05 NOTE — PATIENT INSTRUCTIONS
Current plan is to use Celebrex 200 mg once a day to treat arthritis. For severe pain take Norco 5 mg 1 tablet every 4-6 hours as needed generally 1 or 2 a day. For milder pain use extra strength Tylenol 500 mg 2 tablets twice a day.   The swelling in you

## 2019-09-05 NOTE — ED INITIAL ASSESSMENT (HPI)
82 y/o female to ED with c/o of dyspnea. Patient has a hx of COPD, reports dyspnea and cough has worsened the last few days. Patient reports cough is bad where she has now started to have back pain.  Patient reports she has been producing a lot of clear phl

## 2019-09-05 NOTE — PROGRESS NOTES
[facility]  Dr. Santhosh Polanco Progress Note     Subjective:   Thewang Patel is a(n) 81year old female. Current complaints: Patient presents with:  Osteoarthritis: LOV 4/22/19 c/o right foot swelling for 1 1/2 months  C/o hair loss. Worrying all the time.

## 2019-09-06 NOTE — PROGRESS NOTES
NURSING ADMISSION NOTE      Patient admitted via Cart  Oriented to room. Safety precautions initiated. Bed in low position. Call light in reach. VSS. Pt.  and daughter at bedside.

## 2019-09-06 NOTE — PLAN OF CARE
Problem: Patient/Family Goals  Goal: Patient/Family Long Term Goal  Description  Patient's Long Term Goal: Discharge home with proper resources    Interventions:  - Follow plan of care  - See additional Care Plan goals for specific interventions   Outcom or complex needs related to functional status, cognitive ability or social support system  Outcome: Progressing     Problem: RESPIRATORY - ADULT  Goal: Achieves optimal ventilation and oxygenation  Description  INTERVENTIONS:  - Assess for changes in respi

## 2019-09-06 NOTE — ED PROVIDER NOTES
Patient Seen in: [facility] Emergency Department    History   Patient presents with:  Dyspnea EBCKI SOB (respiratory)    Stated Complaint: becki hx copd    HPI    25-year-old female presents for evaluation of difficulty breathing.   Patient has had diffi Drain for abdominal abscess   • RADIOFREQUENCY ABLATION OF SACROILIAC JOINT Right 2/9/2017    Performed by Stacia Gabriel MD at [address] [place] [address] Right 2/16/2017    Performed by Stacia Gabriel, limits   TROPONIN I - Normal   CBC WITH DIFFERENTIAL WITH PLATELET    Narrative: The following orders were created for panel order CBC WITH DIFFERENTIAL WITH PLATELET.   Procedure                               Abnormality         Status oxygen. Discussed with Dr Mat Coffey      Disposition and Plan     Clinical Impression:  COPD exacerbation ([facility] [address]  (primary encounter diagnosis)    Disposition:  Admit  9/5/2019  9:44 pm    Follow-up:  No follow-up provider specified.       Medications Prescr

## 2019-09-06 NOTE — CONSULTS
[facility]  Pulmonary/Critical Care Consult Note  [facility]  Report of Consultation    Pal Araya Patient Status:  Inpatient    1935 MRN [id number]   [facility] 5NW-A Attending Joy Armstrong Past Surgical History:   Procedure Laterality Date   • BRONCHOSCOPY N/A 6/17/2019    Performed by Delvin Raymond MD at [facility] ENDOSCOPY   • BRONCHOSCOPY N/A 4/30/2019    Performed by Delvin Raymond MD at [address] [place] [address] Oral Daily   • atorvastatin  20 mg Oral Nightly   • enoxaparin  40 mg Subcutaneous Daily   • ipratropium-albuterol  3 mL Nebulization 6 times per day   • docusate sodium  100 mg Oral BID   • MethylPREDNISolone Sodium Succ  60 mg Intravenous Q6H   • matt 09/05/19 2245 — — — 110 18 94 % — —   09/05/19 2115 103/76 — — 101 20 93 % — —   09/05/19 2000 127/80 — — 83 19 95 % — —   09/05/19 1847 127/80 97.6 °F (36.4 °C) Temporal 83 24 92 % 5' 5\" (1.651 m) 175 lb (79.4 kg)       Intake/Output:  No intake or out CHOB    Cultures:     No results found for this visit on 09/05/19.   Recent Labs   Lab 09/05/19 1919   COLORUR Yellow   CLARITY Clear   SPECGRAVITY 1.014   GLUUR Negative   BILUR Negative   KETUR Negative   BLOODURINE Negative   PHURINE 5.0   Chloe Escobar

## 2019-09-06 NOTE — PLAN OF CARE
Problem: Patient/Family Goals  Goal: Patient/Family Long Term Goal  Description  Patient's Long Term Goal: Discharge home with proper resources    Interventions:  - Follow plan of care  - See additional Care Plan goals for specific interventions   Outcom post-hospital services based on physician/LIP order or complex needs related to functional status, cognitive ability or social support system  Outcome: Progressing     Problem: RESPIRATORY - ADULT  Goal: Achieves optimal ventilation and oxygenation  Descri

## 2019-09-06 NOTE — DIETARY NOTE
[address]     Admitting diagnosis:  COPD exacerbation ([facility] [address] [J44.1]    Ht: 165.1 cm (5' 5\")  Wt: 80.5 kg (177 lb 8 oz). This is % of IBW  Body mass index is 29.54 kg/m².   IBW: 61.8 kg    Labs/Meds reviewed    Die

## 2019-09-06 NOTE — PROGRESS NOTES
09/06/19 0143   Provider Notification   Reason for Communication New consult   Provider Name Vee Rollins MD   Method of Communication Page   Response Waiting for response   Notification Time 0143   Notified consult

## 2019-09-06 NOTE — HOME CARE LIAISON
Received referral from [address] [place] [address] with patient at the bedside to discuss home health services. Patient is declining at this time. Informed Lorette Shone.

## 2019-09-06 NOTE — CM/SW NOTE
MSW paged [facility]  [phone] with referral per PT recommendation. They will meet with the patient at bedside to offer services, provide choice and financial disclosure.     Nicolle Ziegler LCSW

## 2019-09-06 NOTE — PROGRESS NOTES
09/06/19 1319   Clinical Encounter Type   Visited With Patient and family together   Sacramental Encounters   Sacrament of Sick-Anointing Anointed   The patient was seen by Gabriela Barreto.  Received prayer, Scripture, support and Sacrament of t

## 2019-09-07 NOTE — PLAN OF CARE
Problem: Patient/Family Goals  Goal: Patient/Family Long Term Goal  Description  Patient's Long Term Goal: Discharge home with proper resources    Interventions:  - Follow plan of care  - See additional Care Plan goals for specific interventions   Outcom patient needs post-hospital services based on physician/LIP order or complex needs related to functional status, cognitive ability or social support system  Outcome: Progressing     Problem: RESPIRATORY - ADULT  Goal: Achieves optimal ventilation and oxyge

## 2019-09-07 NOTE — H&P
[facility]  History & Physical    Vaibhav Figueroa Patient Status:  Inpatient    1935 MRN [id number]   [facility] 5NW-A Attending Romel Chavez MD   Hosp Day # 2 PCP Eric Dietrich MD     History of Present Illness:  Landydeny Figueroa Tristan Curtis MD at [address]   • LUMBAR FACET INJECTION Right 6/22/2015    Performed by Lebron Ko MD at [facility] MAIN OR   • LUMBAR FACET INJECTION Left 6/15/2015    Performed by Lebron Ko MD at [address]   • OOPHORECTOMY     • OTHER SURG benzonatate 200 MG Oral Cap Take 200 mg by mouth 3 (three) times daily as needed for cough. Disp:  Rfl:  9/4/2019 at Unknown time   BREO ELLIPTA 100-25 MCG/INH Inhalation Aerosol Powder, Breath Activated Inhale 1 puff into the lungs daily.  Disp:  Rfl:  9 now. May repeat in 72 hrs if symptoms persist. Please call office if not resolved after 2 doses.  Disp: 2 tablet Rfl: 0 More than a month at Unknown time   sodium chloride 3 % Inhalation Nebu Soln Take 3mL nebulized three times a day Disp: 180 vial Rfl: 1 M sounds active all four quadrants,     no masses, no organomegaly   Genitalia:    Normal female without lesion, discharge or tenderness   Rectal:    Normal tone, no masses or tenderness;    guaiac negative stool   Extremities:   Extremities normal, atraumat

## 2019-09-07 NOTE — SLP NOTE
ADULT SWALLOWING EVALUATION    ASSESSMENT    ASSESSMENT/OVERALL IMPRESSION:  Pt seen this afternoon for BSSE to r/o aspiration. Nurse approved this evaluation. Pt's 2 granddaughters were present.  Pt is an 82 y/o F who was admitted to [facility] on 9/5 Slow rate;Small bites and sips  Aspiration Precautions: Upright position; Slow rate;Small bites and sips  Medication Administration Recommendations: One pill at a time     Treatment Plan/Recommendations: Dysphagia therapy     Discharge Recommendations/Plan: Upright;Midline    Oral Phase of Swallow: Within Functional Limits    Pharyngeal Phase of Swallow: Within Functional Limits  (Please note: Silent aspiration cannot be evaluated clinically.  Videofluoroscopic Swallow Study is required to rule-out silent aspi

## 2019-09-07 NOTE — PHYSICAL THERAPY NOTE
PT order under ADL screening. Chart reviewed and spoke to nursing. Nursing confirmed that pt does not have needs at this time.  Rebecca is familiar to the staff (been here several times for the past year) and is within her baseline level of functional skill

## 2019-09-07 NOTE — PROGRESS NOTES
PATIENT A&OX3 - FORGETFUL,ANXIOUS. ATIVAN WAS GIVEN AS ORDERED. + SOB WITH ACTIVITY. + OCCASIONAL NON PRODUCTIVE COUGH. DENIES CHEST PAIN. NSR TO ST ON TELEMETRY.   + SWELLING TO BOTH LEGS - R > L.   ATTEMPTING TO WEAN O2.   SHE WAS ON 3L AT SHIFT

## 2019-09-07 NOTE — PLAN OF CARE
PROBLEM: COPD exacerbation     ASSESSMENT: Pt is A&Ox4. Forgetful, anxious - Ativan PRN. VSS, afebrile. Maintaining O2 sat WNL on RA. Requires 2L NC with exertion d/t anxiety and SOB with activity.  Strong, non-productive cough - pt feels like she cannot br

## 2019-09-07 NOTE — PROGRESS NOTES
[facility] Pulmonary[facility]Critical [facility] Progress Note     SUBJECTIVE/24H Events: All events, procedures, notes reviewed. No acute events, she still notes feeling dyspneic on exertion. Had bad coughing fit yesterday. No pain. BUN 10 10   CREATSERUM 0.79 0.76   GFRAA 80 84   GFRNAA 69 73   CA 9.1 9.0    140   K 4.1 4.0    106   CO2 29.0 27.0     Recent Labs   Lab 09/05/19 1919 09/06/19  0643   RBC 4.91 4.50   HGB 14.2 12.7   HCT 42.9 39.5   MCV 87.4 87.8   MCH 28. spray Each Nare Daily   • guaiFENesin ER  600 mg Oral BID   • losartan Potassium  50 mg Oral Daily   • PARoxetine HCl  40 mg Oral Daily   • atorvastatin  20 mg Oral Nightly   • enoxaparin  40 mg Subcutaneous Daily   • docusate sodium  100 mg Oral BID   • a

## 2019-09-08 NOTE — PROGRESS NOTES
PATIENT A&OX3 ANXIOUS. VERY FORGETFUL. SHE WAS WALKING IN THE HALLWAY WITH HER FAMILY DURING SHIFT CHANGE. SHE REQUIRES 2L OF SUPPLEMENTAL O2 TO KEEP HER SATS ABOVE 89%  WITH ACTIVITY.  SHE STAYS ON ROOM AIR WHEN SHE IS AT REST OR SLEEPING.  +STRON

## 2019-09-08 NOTE — PROGRESS NOTES
[facility] Pulmonary/Critical Care Progress Note     SUBJECTIVE/24H Events: All events, procedures, notes reviewed. No acute events overnight, patient notes having worsening cough today which remains nonproductive.   Sti 93 87   BUN 10 14 12   CREATSERUM 0.76 0.82 0.64   GFRAA 84 77 95   GFRNAA 73 66 83   CA 9.0 8.6 8.3*    141 141   K 4.0 4.0 3.6    104 107   CO2 27.0 32.0 30.0     Recent Labs   Lab 09/06/19  0643 09/07/19  1334 09/08/19  0648   RBC 4.50 4.56 probably increasing basilar consolidation and developing right pleural effusion. Assessment is limited because of the rotation, and suggest upright PA and lateral views follow-up. Probable stable cardiomegaly.   Grossly unchanged appearance of the left ch diagnosis of a C1 fracture for unknown reasons-no history of trauma-symptomatically improved with conservative treatment.     PLAN  Continue bronchodilators and airway clearance with metanebs  Will resume steroids for short course  Add mucomyst to neb dinah

## 2019-09-08 NOTE — PLAN OF CARE
PROBLEM: COPD exacerbation      ASSESSMENT: Pt is A&Ox4. Forgetful, anxious - Ativan PRN. VSS, afebrile. Maintaining O2 sat WNL on RA. Requires 2L NC with exertion d/t anxiety and SOB with activity. Strong,  productive cough of thick yellow sputum.  Robitus

## 2019-09-08 NOTE — PROGRESS NOTES
[facility]  Progress Note    Jeniffer Laceypenny Patient Status:  Inpatient    1935 MRN [id number]   [facility] 5NW-A Attending Joshua MD Hunter   Hosp Day # 3 PCP Aminta Yeung MD       SUBJECTIVE:  No CP, SOB, or N/V.   Improving Propionate (FLONASE) 50 MCG/ACT nasal spray 1 spray 1 spray Each Nare Daily   guaiFENesin ER (MUCINEX) 12 hr tab 600 mg 600 mg Oral BID   HYDROcodone-acetaminophen (NORCO) 5-325 MG per tab 1 tablet 1 tablet Oral Q4H PRN   LORazepam (ATIVAN) tab 0.5 mg 0.5 Chronic right shoulder pain     Primary osteoarthritis of right knee     Acute right-sided low back pain without sciatica     Hypokalemia     Hypoxia     Bronchiectasis (HCC)     Lung abnormality     Closed displaced fracture of first cervical vertebra (HC

## 2019-09-08 NOTE — PROGRESS NOTES
[facility]  Progress Note    Terry Pacheco Patient Status:  Inpatient    1935 MRN [id number]   [facility] 5NW-A Attending Rahul Shah MD   Hosp Day # 3 PCP Ike Juan MD       SUBJECTIVE:  No CP, SOB, or N/V.   Worsening (BREO ELLIPTA) 100-25 MCG/INH inhaler 1 puff 1 puff Inhalation Daily   diltiazem (CARDIZEM CD) 24 hr cap 120 mg 120 mg Oral Daily   Fluticasone Propionate (FLONASE) 50 MCG/ACT nasal spray 1 spray 1 spray Each Nare Daily   guaiFENesin ER (MUCINEX) 12 hr tab disease) ([facility] [address]     Post-herpetic polyneuropathy     Episode of recurrent major depressive disorder (HCC)     Degenerative disc disease, lumbar     Chronic right shoulder pain     Primary osteoarthritis of right knee     Acute right-sided low back pain with

## 2019-09-09 NOTE — PROGRESS NOTES
09/09/19 0352   BiPAP   $ RT Standby Charge (per 15 min) 1   $ LARISSA Follow up charge Yes   Device RemStar - CPAP   Mode Spontaneous   Interface Nasal mask   Mask Size Medium   Control Settings   Oxygen Percent 21 %   Max P 14   Min P 6   BiPAP/CPAP Monit

## 2019-09-09 NOTE — PLAN OF CARE
Problem: Patient/Family Goals  Goal: Patient/Family Long Term Goal  Description  Patient's Long Term Goal: Discharge home with proper resources    Interventions:  - Follow plan of care  - See additional Care Plan goals for specific interventions   Outcom appropriate  - Assess patient's ability to be responsible for managing their own health  - Refer to Case Management Department for coordinating discharge planning if the patient needs post-hospital services based on physician/LIP order or complex needs rel to monitor.

## 2019-09-09 NOTE — PROGRESS NOTES
Problem:   COPD     Data: Alert and oriented but anxious. Room air. 2L with exertion. OSAP with CPAP. Tele. Up with standby and a walker. Sputum +. IV abx. Saline locked. Voids. Steroids.      Action: Keep patient comfortable and continue to monitor every 2

## 2019-09-09 NOTE — PLAN OF CARE
Problem: Patient/Family Goals  Goal: Patient/Family Long Term Goal  Description  Patient's Long Term Goal: Discharge home with proper resources    Interventions:  - Follow plan of care  - See additional Care Plan goals for specific interventions   Outcom appropriate  - Assess patient's ability to be responsible for managing their own health  - Refer to Case Management Department for coordinating discharge planning if the patient needs post-hospital services based on physician/LIP order or complex needs rel

## 2019-09-09 NOTE — SLP NOTE
SPEECH DAILY NOTE - INPATIENT    ASSESSMENT & PLAN   ASSESSMENT  Pt seen for initial bedside swallow evaluation on 9/7/2019 with recommendations for regular diet with thin liquids.   Pt cleared for small single sips from straw due to immediate cough assesse strategies. Chest x-ray completed 9/9/2019 with improvements:  CONCLUSION:  There is improved aeration of the lungs compared with the prior exam.  Minimal basilar atelectasis is likely present. If there is persistent clinical concern then consider CT.

## 2019-09-09 NOTE — PROGRESS NOTES
[facility]  Progress Note    Bonifacio Bird Patient Status:  Inpatient    1935 MRN [id number]   [facility] 5NW-A Attending Jazmin Alexander MD   [facility] Day # 4 PCP Niesha Shepherd MD     Subjective:  Bonifacio Bird is a(n) 81year old 09/09/19  0649   GLU 93 87 100*   BUN 14 12 10   CREATSERUM 0.82 0.64 0.66   GFRAA 77 95 94   GFRNAA 66 83 82   CA 8.6 8.3* 8.6    141 138   K 4.0 3.6 3.4*    107 101   CO2 32.0 30.0 29.0     No results for input(s): ABGPHT, PGSNDC6G, GOIVV3T,

## 2019-09-09 NOTE — CM/SW NOTE
09/09/19 1213   Discharge disposition   Expected discharge disposition Home or Self   Name of [address]   Patient Refuses Rehab Services Yes     Patient declines home health services.     / to remain [employer]

## 2019-09-10 NOTE — PLAN OF CARE
Problem: Patient/Family Goals  Goal: Patient/Family Long Term Goal  Description  Patient's Long Term Goal: Discharge home with proper resources    Interventions:  - Follow plan of care  - See additional Care Plan goals for specific interventions   Outcom Spirometry  - Assess the need for suctioning and perform as needed  - Assess and instruct to report SOB or any respiratory difficulty  - Respiratory Therapy support as indicated  - Manage/alleviate anxiety  - Monitor for signs/symptoms of CO2 retention  Cici Manning

## 2019-09-10 NOTE — PROGRESS NOTES
[facility]  Progress Note    Taisha Gastelum Patient Status:  Inpatient    1935 MRN [id number]   [facility] 5NW-A Attending Michelle Garcia MD   Hosp Day # 5 PCP Michael Levi MD     Subjective:  Taisha Gastelum is a(n) 81year old 100*  --  86   BUN 12 10  --  11   CREATSERUM 0.64 0.66  --  0.71   GFRAA 95 94  --  91   GFRNAA 83 82  --  79   CA 8.3* 8.6  --  8.6    138  --  139   K 3.6 3.4* 4.5 4.0    101  --  105   CO2 30.0 29.0  --  31.0     No results for input(s): AB

## 2019-09-10 NOTE — RESPIRATORY THERAPY NOTE
LARISSA - Equipment Use Daily Summary:  · Set Mode   · Usage in hours: 7.2  · 90% Pressure (EPAP) level:   · 90% Insp Pressure (IPAP): 10  · AHI: 6.8  · Supplemental Oxygen:  · Comments:

## 2019-09-10 NOTE — PROGRESS NOTES
NURSING DISCHARGE NOTE    Discharged Home via Wheelchair. Accompanied by Family member  Belongings Taken by patient/family DISCHARGE INSRUCTIONS GIVEN AND REVIEWED ALL QUESTIONS ANSWERED. Bouchra Obrien

## 2019-09-10 NOTE — PLAN OF CARE
Problem: Patient/Family Goals  Goal: Patient/Family Long Term Goal  Description  Patient's Long Term Goal: Discharge home with proper resources    Interventions:  - Follow plan of care  - See additional Care Plan goals for specific interventions   Outcom partner  - Complete POLST form as appropriate  - Assess patient's ability to be responsible for managing their own health  - Refer to Case Management Department for coordinating discharge planning if the patient needs post-hospital services based on physic

## 2019-09-10 NOTE — PROGRESS NOTES
[facility]  Progress Note    Heriberto Romo Patient Status:  Inpatient    1935 MRN [id number]   [facility] 5NW-A Attending Herson Seay MD   Hosp Day # 5 PCP Evaristo Chopra MD       SUBJECTIVE:  No CP, or N/V.   Improved wheezi Furoate-Vilanterol (BREO ELLIPTA) 100-25 MCG/INH inhaler 1 puff 1 puff Inhalation Daily   diltiazem (CARDIZEM CD) 24 hr cap 120 mg 120 mg Oral Daily   Fluticasone Propionate (FLONASE) 50 MCG/ACT nasal spray 1 spray 1 spray Each Nare Daily   guaiFENesin ER obstructive pulmonary disease) (HCC)     Post-herpetic polyneuropathy     Episode of recurrent major depressive disorder (HCC)     Degenerative disc disease, lumbar     Chronic right shoulder pain     Primary osteoarthritis of right knee     Acute right-si

## 2019-09-10 NOTE — PROGRESS NOTES
09/09/19 2135   BiPAP   $ LARISSA Follow up charge Yes   Device RemStar - CPAP   BiPAP / CPAP CE# D10   Mode Spontaneous   Interface Nasal mask   Mask Size Medium   Control Settings   Max P 14   Min P 6   O2 Flow Rate 2 lpm   SIPAP   Resp 20   BiPAP/CPAP Mo

## 2019-09-11 NOTE — PROGRESS NOTES
[facility]  Progress Note    Chica  Patient Status:  Inpatient    1935 MRN [id number]   [facility] 5NW-A Attending No att. providers found   [facility] Day # 5 PCP Kalli Dawn MD       SUBJECTIVE:  No CP, SOB, or N/V.   Improve ([facility] [address]     Lung abnormality     Closed displaced fracture of first cervical vertebra (HCC)     Hyperglycemia     Bilateral leg edema     COPD exacerbation (HCC)        Plan:   1  Copd exacerbation  2  Respiratory distress  3  Cough  4  Bronchial atresia  5

## 2019-09-18 NOTE — TELEPHONE ENCOUNTER
Pt just called to say that she received a bill for services she never received by dr Rosalinda Walker. She said she came for a vaginal problem that didn't have to do anything with the breast. Please advise and call pt.  Thanks

## 2019-09-20 PROBLEM — N76.0 VAGINITIS AND VULVOVAGINITIS: Status: ACTIVE | Noted: 2019-01-01

## 2019-10-01 NOTE — DISCHARGE SUMMARY
[facility]  Discharge Summary    Andrew Soriano Patient Status:  Inpatient    1935 MRN [id number]   [facility] 5NW-A Attending No att. providers found   [facility] Day # 5 PCP Jasmina Quinn MD     Date of Admission: 2019  6:51 P Disp-30 tablet, R-0    Umeclidinium Bromide 62.5 MCG/INH Inhalation Aerosol Powder, Breath Activated  Inhale 1 puff into the lungs daily. , Normal, Disp-1 each, R-11      CONTINUE these medications which have CHANGED    fluconazole 200 MG Oral Tab  Take 1 t 37.5-25 MG Oral Tab  Take 1 tablet by mouth every other day., Historical, R-1    Terconazole 0.4 % Vaginal Cream  APPLY EXTERNALLY TO THE VULVA TWICE DAILY, Normal, Disp-45 g, R-0    sodium chloride 3 % Inhalation Nebu Soln  Take 3mL nebulized three times

## 2019-12-05 NOTE — ED INITIAL ASSESSMENT (HPI)
Patient here SOB x's week with wheezing. Patient saw her pulmonologist yesterday and started prednisone. Today feeling worse with congested cough. Last neb this morning at 1100.

## 2019-12-05 NOTE — ED PROVIDER NOTES
Patient Seen in: [facility] Emergency Department      History   Patient presents with:  Dyspnea BECKI SOB    Stated Complaint: sob x1 week. started on prednisone. copd exacerbation.   pt reports becki w/ any*    HPI    41-year-old woman with a history [facility] 2/16/2017    Performed by Adenike Bragg MD at [facility] MAIN OR   • REMOVAL GALLBLADDER     • SACROILIAC JOINT INJECTION BILATERAL Bilateral within normal limits   ABG PANEL W ELECT AND LACTATE - Abnormal; Notable for the following components:    ABG pH 7.46 (*)     ABG pCO2 34 (*)     ABG pO2 59 (*)     All other components within normal limits   CBC W/ DIFFERENTIAL - Abnormal; Notable for the

## 2019-12-06 NOTE — PROGRESS NOTES
[facility]Critical Care Progress Note     SUBJECTIVE/Interval history: All events, procedures, notes reviewed. Worsening dyspnea this morning, improved now post neb treatment.  She notes continued thick phlegm edema   Skin:No rashes or lesions.   Lymph nodes:Not examined          Lab Data Review:   Recent Labs   Lab 12/05/19  1217 12/06/19  0540   * 159*   BUN 13 12   CREATSERUM 0.85 0.73   GFRAA 73 88   GFRNAA 64 76   CA 8.8 8.4*    135*   K 4.0 4.3 times per day   • dilTIAZem HCl ER Coated Beads  120 mg Oral Daily   • Fluticasone Propionate  1 spray Each Nare Daily   • guaiFENesin ER  600 mg Oral BID   • PARoxetine HCl  40 mg Oral Daily   • Umeclidinium Bromide  1 puff Inhalation Daily   • enoxaparin

## 2019-12-06 NOTE — H&P
[facility]  History & Physical    Beckychau Galiciamary beth Patient Status:  Inpatient    1935 MRN [id number]   [facility] 5NW-A Attending Jerry Powers MD   Hosp Day # 1 PCP Audrey Donovan MD     History of Present Illness:  Becky Siddiqui ABLATION OF SACROILIAC JOINT Right 2/9/2017    Performed by Narda Underwood MD at [facility] MAIN OR   • [address] Right 2/16/2017    Performed by Narda Underwood MD at [facility] MAIN OR   • REMOVAL GALLBLADDER     • Take 120 mg by mouth daily. , Disp: , Rfl: 11, 12/5/2019 at Unknown time  GuaiFENesin  MG Oral Tablet 12 Hr, Take 1 tablet (600 mg total) by mouth 2 (two) times daily. , Disp: 30 tablet, Rfl: 0, Past Month at Unknown time  Cholecalciferol (VITAMIN D tenderness or deformity    Heart:    Regular rate and rhythm, S1 and S2 normal, no murmur, rub   or gallop   Breast Exam:    No tenderness, masses, or nipple abnormality   Abdomen:     Soft, non-tender, bowel sounds active all four quadrants,     no masses without sciatica     Hypokalemia     Hypoxia     Bronchiectasis (HCC)     Lung abnormality     Closed displaced fracture of first cervical vertebra (HCC)     Hyperglycemia     Bilateral leg edema     COPD exacerbation (HCC)     Vaginitis and vulvovaginitis

## 2019-12-06 NOTE — PLAN OF CARE
Problem: Patient/Family Goals  Goal: Patient/Family Long Term Goal  Description  Patient's Long Term Goal: \" go home\"     Interventions:  - Comply with plan of care   - See additional Care Plan goals for specific interventions   Outcome: Progressing  G or other facility with appropriate resources  Description  INTERVENTIONS:  - Identify barriers to discharge w/pt and caregiver  - Include patient/family/discharge partner in discharge planning  - Arrange for needed discharge resources and transportation as results as appropriate  - Fluid restriction as ordered  - Instruct patient on fluid and nutrition restrictions as appropriate  Outcome: Progressing     Situation: COPD exacerbation     Background: HX: COPD, anxiety, HTN    Assessment: AOx4, room air (weari

## 2019-12-06 NOTE — PROGRESS NOTES
NURSING ADMISSION NOTE      Patient admitted via Ambulatory  Oriented to room. Safety precautions initiated. Bed in low position. Call light in reach. Pt A&Ox4 . 4L O2 via nasal cannula. . RA baseline. Voids.  Up with standby assist. Pt and hus

## 2019-12-06 NOTE — BH PROGRESS NOTE
[facility] Resource Referral Counselor Note    Darlyn Polk Patient Status:  Inpatient    1935 MRN [id number]   [facility] 5NW-A Attending Joaquin Chino MD   Hosp Day # 1 PCP Terese Dietrich MD       S(subjective) The pt

## 2019-12-06 NOTE — CM/SW NOTE
ADAM met with the patient and her  at bedside to assess for [address] needs. Patient lives with her spouse who helps with ADL's, she is using a walker to ambulate. Patient does not think that she needs [address] at this time.     LORENZA Caballero

## 2019-12-06 NOTE — PLAN OF CARE
Problem: Patient/Family Goals  Goal: Patient/Family Long Term Goal  Description  Patient's Long Term Goal: \" go home\"     Interventions:  - Comply with plan of care   - See additional Care Plan goals for specific interventions   Outcome: Progressing  G appropriate resources  Description  INTERVENTIONS:  - Identify barriers to discharge w/pt and caregiver  - Include patient/family/discharge partner in discharge planning  - Arrange for needed discharge resources and transportation as appropriate  - Identif Fluid restriction as ordered  - Instruct patient on fluid and nutrition restrictions as appropriate  Outcome: Progressing

## 2019-12-06 NOTE — PROGRESS NOTES
Problem: COPD exacerbation      Data: Pt A&Ox4. Can be anxious at times. 3L O2 via nasal cannula. . RA baseline. Wean O2 as able. BLE edema Voids. Up with standby assist. Pt updated on plan of care and verbalized understanding.  Safety precautions in pl

## 2019-12-06 NOTE — CONSULTS
[facility]  Report of Consultation    Roxana Batres Patient Status:  Inpatient    1935 MRN [id number]   [facility] 5NW-A Attending Lurdes Tucker MD   Hosp Day # 0 PCP Jabier Garcia MD     Reason for Consultation:  Dyspnea h She continues to smoke 1 or 2/day but her  has resumed smoking next to her in the house so she is getting secondhand smoke as well    Allergies:    Sulfa Antibiotics       HIVES  Latex                   RASH    Medications:  predniSONE 20 MG Oral Ta 12/5/2019 at Unknown time  simvastatin (ZOCOR) 20 MG Oral Tab, Take 20 mg by mouth daily. , Disp: , Rfl: 5, 12/5/2019 at Unknown time  Multiple Vitamins-Minerals (CENTRUM SILVER ULTRA WOMENS) Oral Tab, Take 1 tablet by mouth daily. , Disp: , Rfl: , 12/5/20 and rhythm no murmurs appreciated   Abdomen: soft, non-distended, no masses, no guarding, no     Rebound.   Protuberant slightly distended nontender no obvious hepatosplenomegaly or ascites   Extremity: Trace edema right greater than left nontender   Skin: (chronic obstructive pulmonary disease) (HCC)     Post-herpetic polyneuropathy     Episode of recurrent major depressive disorder (HCC)     Degenerative disc disease, lumbar     Chronic right shoulder pain     Primary osteoarthritis of right knee     Acute

## 2019-12-06 NOTE — PLAN OF CARE
Problem: Patient/Family Goals  Goal: Patient/Family Long Term Goal  Description  Patient's Long Term Goal: \" go home\"     Interventions:  - Comply with plan of care   - See additional Care Plan goals for specific interventions   Outcome: Progressing  G DISCHARGE PLANNING  Goal: Discharge to home or other facility with appropriate resources  Description  INTERVENTIONS:  - Identify barriers to discharge w/pt and caregiver  - Include patient/family/discharge partner in discharge planning  - Arrange for need replacements, including rhythm and repeat lab results as appropriate  - Fluid restriction as ordered  - Instruct patient on fluid and nutrition restrictions as appropriate  Outcome: Progressing

## 2019-12-07 NOTE — PHYSICAL THERAPY NOTE
PT eval orders received, chart reviewed. Pt just finished respiratory treatment. Reports she is still feeling some SOB and declines PT eval at this time. Pt requests PT return 12/8/19. Will check back tomorrow to complete PT eval as appropriate.  RN aware a 67 y/o F presents ot ED c/o headache and periorbital pain and tenderness s/p fall.  Pt states she was walking outdoors and fell. She does not recall tripping over anything but does recall attempting to brace her fall with her R hand.  Pt struck her face on the ground hitting her glasses on her face.  The glasses did not break.  She denies LOC.  Pt has an ecchymosis beneath the L eye.  She denies anticoagulants, neck or back pain, chest pain, SOB, abd pain, n/v/d, weakness, numbness, visual changes.

## 2019-12-07 NOTE — PLAN OF CARE
Pt a&ox4, anxious. VSS. , requiring between 2-3 L. Ra is baseline. Will have intense coughing fits. Relieved with nebs. SOB with exertion. Solumedrol, weaned today. CPAP with naps/sleep. Tele, NSR/ST with exertion. Lovenox. Voids.  IV abx. K+-2.9 replace Anticipate increased pain with activity and pre-medicate as appropriate  12/7/2019 1222 by Romero Ibrahim RN  Outcome: Progressing  12/7/2019 1222 by Romero Ibrahim RN  Outcome: Progressing     Problem: SAFETY ADULT - FALL  Goal: Free from fall injury  D ADULT  Goal: Achieves optimal ventilation and oxygenation  Description  INTERVENTIONS:  - Assess for changes in respiratory status  - Assess for changes in mentation and behavior  - Position to facilitate oxygenation and minimize respiratory effort  - Oxyg

## 2019-12-07 NOTE — PROGRESS NOTES
[facility]  Progress Note    Chepe Part Patient Status:  Inpatient    1935 MRN [id number]   [facility] 5NW-A Attending Eugenio Garner MD   Hosp Day # 2 PCP Shawnee Cerna MD       SUBJECTIVE:  No CP, SOB, or N/V.   Not improv nebulizer solution 2.5 mg, 2.5 mg, Nebulization, Q2H PRN  benzonatate (TESSALON) cap 200 mg, 200 mg, Oral, TID PRN  diltiazem (CARDIZEM CD) 24 hr cap 120 mg, 120 mg, Oral, Daily  Fluticasone Propionate (FLONASE) 50 MCG/ACT nasal spray 1 spray, 1 spray, Eac osteoarthritis of both knees     COPD (chronic obstructive pulmonary disease) (HCC)     Post-herpetic polyneuropathy     Episode of recurrent major depressive disorder (HCC)     Degenerative disc disease, lumbar     Chronic right shoulder pain     Primary

## 2019-12-07 NOTE — PLAN OF CARE
Problem: Patient/Family Goals  Goal: Patient/Family Long Term Goal  Description  Patient's Long Term Goal: \" go home\"     Interventions:  - Comply with plan of care   - See additional Care Plan goals for specific interventions   Outcome: Progressing  G assist with strengthening/mobility  - Encourage toileting schedule  Outcome: Progressing     Problem: DISCHARGE PLANNING  Goal: Discharge to home or other facility with appropriate resources  Description  INTERVENTIONS:  - Identify barriers to discharge w/ imbalances  - Administer electrolyte replacement as ordered  - Monitor response to electrolyte replacements, including rhythm and repeat lab results as appropriate  - Fluid restriction as ordered  - Instruct patient on fluid and nutrition restrictions as a

## 2019-12-07 NOTE — PLAN OF CARE
PROBLEM: COPD Exacerbation    ASSESSMENT: PT is A&OX4, anxious, prn ativan given per MAR.  VSS, afebrile. Maintaining O2 sats >94% on 2L, RA is baseline. LARISSA CPAP. .  Very dyspneic on exertion, pt also c/o sore throat, prn throat lozenge provided per [facility] Problem: SAFETY ADULT - FALL  Goal: Free from fall injury  Description  INTERVENTIONS:  - Assess pt frequently for physical needs  - Identify cognitive and physical deficits and behaviors that affect risk of falls.   - [place] fall precautions as indica hygiene including cough, deep breathe, Incentive Spirometry  - Assess the need for suctioning and perform as needed  - Assess and instruct to report SOB or any respiratory difficulty  - Respiratory Therapy support as indicated  - Manage/alleviate anxiety

## 2019-12-07 NOTE — PROGRESS NOTES
[facility] Pulmonary/Critical Care Progress Note     SUBJECTIVE/Interval history: All events, procedures, notes reviewed. Had slight worsening dyspnea this morning and impoved with nebs.   Used CPAP overnight without iss guarding. No rebound. Extremities:trace edema   Skin:No rashes or lesions.   Lymph nodes:Not examined          Lab Data Review:   Recent Labs   Lab 12/05/19  1217 12/06/19  0540 12/07/19  0632   * 159* 156*   BUN 13 12 11   CREATSERUM 0.85 0.73 0.8 examination.     Dictated by: Malachi Holstein, MD on 12/06/2019 at 6:58     Approved by: Malachi Holstein, MD on 12/06/2019 at 6:59          Xr Chest Ap Portable  (cpt=71045)    Result Date: 12/5/2019  CONCLUSION:  No change since prior exam.    Dictated by: scoliosis with progressive height loss  · Ongoing smoker     Plan:  · Continue inhaled corticosteroids and bronchodilators  · Continue systemic steroids -wean dose  · Continue bronchopulmonary toileting including VEST treatment  · Continue nocturnal NIPPV

## 2019-12-08 NOTE — PLAN OF CARE
Pt a&ox4, anxious. Ativan PRN. VSS. , requiring between 2-3 L. Ra is baseline. Will have intense coughing fits. Relieved with nebs. Tessalon given. SOB with exertion. Solumedrol. CPAP with naps/sleep. CT of chest completed today, see results.  CXR ordere measures as appropriate and evaluate response  - Consider cultural and social influences on pain and pain management  - Manage/alleviate anxiety  - Utilize distraction and/or relaxation techniques  - Monitor for opioid side effects  - Notify MD/LIP if inte RESPIRATORY - ADULT  Goal: Achieves optimal ventilation and oxygenation  Description  INTERVENTIONS:  - Assess for changes in respiratory status  - Assess for changes in mentation and behavior  - Position to facilitate oxygenation and minimize respiratory

## 2019-12-08 NOTE — PLAN OF CARE
PROBLEM: COPD Exacerbation     ASSESSMENT: Pt is A&OX4, anxious, prn restoril given per [facility] for sleep. VSS, afebrile. Maintaining O2 sats >94% on 2L, RA is baseline. LARISSA CPAP. . Pt refusing midnight vitals tonight.  Very dyspneic on exertion, pt also c/ increased pain with activity and pre-medicate as appropriate  Outcome: Progressing     Problem: SAFETY ADULT - FALL  Goal: Free from fall injury  Description  INTERVENTIONS:  - Assess pt frequently for physical needs  - Identify cognitive and physical defi Provide Smoking Cessation handout, if applicable  - Encourage broncho-pulmonary hygiene including cough, deep breathe, Incentive Spirometry  - Assess the need for suctioning and perform as needed  - Assess and instruct to report SOB or any respiratory diff

## 2019-12-08 NOTE — PROGRESS NOTES
[facility]  Progress Note    Chan Rivera Patient Status:  Inpatient    1935 MRN [id number]   [facility] 5NW-A Attending Garry Cain MD   Hosp Day # 2 PCP Prudence Olson MD       SUBJECTIVE:  No CP, SOB, or N/V.    OBJECTIV mg, 200 mg, Oral, TID PRN  diltiazem (CARDIZEM CD) 24 hr cap 120 mg, 120 mg, Oral, Daily  Fluticasone Propionate (FLONASE) 50 MCG/ACT nasal spray 1 spray, 1 spray, Each Nare, Daily  guaiFENesin ER (MUCINEX) 12 hr tab 600 mg, 600 mg, Oral, BID  LORazepam (A Degenerative disc disease, lumbar     Chronic right shoulder pain     Primary osteoarthritis of right knee     Acute right-sided low back pain without sciatica     Hypokalemia     Hypoxia     Bronchiectasis (HCC)     Lung abnormality     Closed displaced f

## 2019-12-08 NOTE — PROGRESS NOTES
[facility]  Progress Note    Pal Araya Patient Status:  Inpatient    1935 MRN [id number]   [facility] 5NW-A Attending Joy Armstrong MD   Hosp Day # 3 PCP Maykel Ruiz MD     Subjective:  Pla Araya is a(n) 81year old ABGBE 3.3*   TEMP 97.6   REN Positive   SITE Left Radial   DEV Nasal cannula   THGB 13.0       Invalid input(s): CKTOTAL, TROPONINI, TROPONINT, CKMBINDEX    Cultures:  n/a    Radiology:  Chest CT 12/8:   There are new patchy ground-glass infiltrates in Oral, Q4H PRN  morphINE sulfate (PF) 4 MG/ML injection 1 mg, 1 mg, Intravenous, Q2H PRN    Or  morphINE sulfate (PF) 4 MG/ML injection 2 mg, 2 mg, Intravenous, Q2H PRN    Or  morphINE sulfate (PF) 4 MG/ML injection 4 mg, 4 mg, Intravenous, Q2H PRN  temazep

## 2019-12-08 NOTE — PROGRESS NOTES
12/08/19 1617   Clinical Encounter Type   Visited With Patient and family together   Continue Visiting No  ( remains available at pager #[phone] as needed/requested.)   Amish Encounters   Spiritual Requests During Visit / Hospitalization Cathol

## 2019-12-08 NOTE — PHYSICAL THERAPY NOTE
Two attempts to see pt for PT eval this am. Initial attempt at 9:15, pt refusing reporting she is not feeling well enough, requests return later. Scheduled time with pt to return at 10:30.  Returned at 10:30 and pt sleeping, aroused easily, but refuses repo

## 2019-12-09 NOTE — PROGRESS NOTES
[facility]  Progress Note    Kaiden Mccrary Patient Status:  Inpatient    1935 MRN [id number]   [facility] 5NW-A Attending Austin Shaffer MD   Hosp Day # 3 PCP Maci Castrejon MD       SUBJECTIVE:  No CP,, or N/V.   Worse today  I nebulizer solution 2.5 mg, 2.5 mg, Nebulization, Q2H PRN  benzonatate (TESSALON) cap 200 mg, 200 mg, Oral, TID PRN  diltiazem (CARDIZEM CD) 24 hr cap 120 mg, 120 mg, Oral, Daily  Fluticasone Propionate (FLONASE) 50 MCG/ACT nasal spray 1 spray, 1 spray, Eac Post-herpetic polyneuropathy     Episode of recurrent major depressive disorder (HCC)     Degenerative disc disease, lumbar     Chronic right shoulder pain     Primary osteoarthritis of right knee     Acute right-sided low back pain without sciatica     Hy

## 2019-12-09 NOTE — PLAN OF CARE
PROBLEM: COPD Exacerbation     ASSESSMENT: Pt is A&OX4, anxious, prn restoril given per [facility] for sleep.  VSS, afebrile. Maintaining O2 sats >94% on RA. LARISSA CPAP. . Pt refusing midnight vitals tonight.  Very dyspneic on exertion, pt also c/o sore throat, p unsuccessful or patient reports new pain  - Anticipate increased pain with activity and pre-medicate as appropriate  Outcome: Progressing     Problem: SAFETY ADULT - FALL  Goal: Free from fall injury  Description  INTERVENTIONS:  - Assess pt frequently for supplementation based on oxygen saturation or ABGs  - Provide Smoking Cessation handout, if applicable  - Encourage broncho-pulmonary hygiene including cough, deep breathe, Incentive Spirometry  - Assess the need for suctioning and perform as needed  - Ass

## 2019-12-09 NOTE — PLAN OF CARE
Problem: Patient/Family Goals  Goal: Patient/Family Long Term Goal  Description  Patient's Long Term Goal: \" go home\"     Interventions:  - Comply with plan of care   - See additional Care Plan goals for specific interventions   12/9/2019 1642 by Lynn Ferreira needs  - Identify cognitive and physical deficits and behaviors that affect risk of falls.   - [place] fall precautions as indicated by assessment.  - Educate pt/family on patient safety including physical limitations  - Instruct pt to call for assistance Cessation handout, if applicable  - Encourage broncho-pulmonary hygiene including cough, deep breathe, Incentive Spirometry  - Assess the need for suctioning and perform as needed  - Assess and instruct to report SOB or any respiratory difficulty  - Respir PT and Pharmacy caring for Guy Rodriguez.      Other care providers present:    Mobility Goal:    Readmission Risk:     [] Low     [] Medium     [x] High    Active issue(s) requiring resolution prior to discharge: transition to PO steroids    Anticipated dis

## 2019-12-09 NOTE — PROGRESS NOTES
[facility]  Progress Note    Margaret Link Patient Status:  Inpatient    1935 MRN [id number]   [facility] 5NW-A Attending Sophie Keating MD   [facility] Day # 4 PCP Shade Shaw MD     Subjective:  Margaret Link is a(n) 81year old 13   CREATSERUM 0.73 0.84  --  0.80   GFRAA 88 74  --  79   GFRNAA 76 64  --  68   CA 8.4* 8.6  --  8.2*   * 135*  --  136   K 4.3 3.1* 4.0 3.9    100  --  100   CO2 29.0 27.0  --  31.0     Recent Labs   Lab 12/08/19  1618   ABGPHT 7.45   ABGPC

## 2019-12-09 NOTE — RESPIRATORY THERAPY NOTE
LARISSA - Equipment Use Daily Summary:                  . Set Mode: AUTO CPAP WITH C-FLEX                . Usage in hours: 10:15                . 90% Pressure (EPAP) level: 6.0                . 90% Insp. Pressure (IPAP): Evelin Spar  AHI: 4.0

## 2019-12-09 NOTE — PLAN OF CARE
Problem: Patient/Family Goals  Goal: Patient/Family Long Term Goal  Description  Patient's Long Term Goal: \" go home\"     Interventions:  - Comply with plan of care   - See additional Care Plan goals for specific interventions   Outcome: Progressing  G assessment  - Modify environment to reduce risk of injury  - Provide assistive devices as appropriate  - Consider OT/PT consult to assist with strengthening/mobility  - Encourage toileting schedule  Outcome: Progressing     Problem: 98 Kisha Brown within normal limits  Description  INTERVENTIONS:  - Monitor labs and rhythm and assess patient for signs and symptoms of electrolyte imbalances  - Administer electrolyte replacement as ordered  - Monitor response to electrolyte replacements, including rhy

## 2019-12-09 NOTE — PHYSICAL THERAPY NOTE
PHYSICAL THERAPY EVALUATION - INPATIENT     Room Number: 763/810-M  Evaluation Date: 12/9/2019  Type of Evaluation: Initial  Physician Order: PT Eval and Treat    Presenting Problem: COPD exacerbation  Reason for Therapy: Mobility Dysfunction and Dis CHOLANGIOPANCREATOGRAPHY (ERCP) N/A 11/14/2014    Performed by Livia Luciano MD at [facility]   [facility]     • HERNIA SURGERY  05/18/16   • HERNIA VENTRAL REPAIR N/A 5/18/2016    Performed by Wayna Curling, MD at [facility] MAIN OR   • Kristian Yu follows all commands and directions without difficulty  · Safety Judgement:  good awareness of safety precautions    RANGE OF MOTION AND STRENGTH ASSESSMENT  Upper extremity ROM and strength are within functional limits, except R shld limited 0-3/4 AROM; M rollator. Pt then able to amb 200' using rollator c supervision, shuffled pattern and intermittent WB through forearms. Pt then returned to her room and sat in [facility] c mod indep. RN made aware. D/w pt incr compliance c t/f OOB to the [facility] and amb with staff. Potential : Fair  Frequency (Obs): 3x/week  Number of Visits to Meet Established Goals:  Other (Comment)(2-3 visits)      CURRENT GOALS    Goal #1 Patient is able to demonstrate supine - sit EOB @ level: supervision     Goal #2 Patient is able to demonstrat

## 2019-12-10 NOTE — PROGRESS NOTES
Endorsed care to [address]. Patient in stable condition. See flowsheets. Axo x 4, forgetful and anxious at times. RA. IV solumedrol. Nebs. . LARISSA with CPAP. Tele- NS. Voids. Lovenox for VTE, refused SCDs and FELTON. Denies pain. Standby with walker.

## 2019-12-10 NOTE — PROGRESS NOTES
[facility]  Progress Note    Margaret Link Patient Status:  Inpatient    1935 MRN [id number]   [facility] 5NW-A Attending Sophie Keating MD   Hosp Day # 5 PCP Shade Shaw MD     Subjective:  Margaret Link is a(n) 81year old 12/10/19  0610   NA  --  136 134*   K 4.0 3.9 3.9   CL  --  100 99   CO2  --  31.0 29.0   BUN  --  13 15   CREATSERUM  --  0.80 0.76     No results for input(s): PTP, INR, PTT in the last 168 hours.     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

## 2019-12-10 NOTE — PLAN OF CARE
Problem: Patient/Family Goals  Goal: Patient/Family Long Term Goal  Description  Patient's Long Term Goal: \" go home\"     Interventions:  - Comply with plan of care   - See additional Care Plan goals for specific interventions   Outcome: Progressing  G with activity based on assessment  - Modify environment to reduce risk of injury  - Provide assistive devices as appropriate  - Consider OT/PT consult to assist with strengthening/mobility  - Encourage toileting schedule  Outcome: Progressing     Problem: Electrolytes maintained within normal limits  Description  INTERVENTIONS:  - Monitor labs and rhythm and assess patient for signs and symptoms of electrolyte imbalances  - Administer electrolyte replacement as ordered  - Monitor response to electrolyte rep

## 2019-12-10 NOTE — RESPIRATORY THERAPY NOTE
LARISSA Equipment Usage Summary :            Set Mode :AUTO CPAP W FLEX          Usage in Hours:5;18          90% Pressure (EPAP) : 4.5           90% Insp Pressure (IPAP);           AHI : 7          Supplemental Oxygen :      LPM

## 2019-12-10 NOTE — PROGRESS NOTES
[facility]  Progress Note    Prieto Boo Patient Status:  Inpatient    1935 MRN [id number]   [facility] 5NW-A Attending Jacqueline Horton MD   Hosp Day # 4 PCP Antwon Harvey MD       SUBJECTIVE:  AMBULATING [address] Nebulization, Daily  hydrocodone-homatropine (HYDROMET) 5-1.5 MG/5ML syrup 5 mL, 5 mL, Oral, Nightly PRN  dextromethorphan-guaiFENesin (ROBITUSSIN-DM)  MG/5ML liquid 5 mL, 5 mL, Oral, Q6H PRN  benzocaine-menthol (CEPACOL (SUGAR-FREE)) 1 lozenge, 1 lo Nightly  Triamterene-HCTZ (MAXZIDE) 75-50 MG per tab 0.5 tablet, 0.5 tablet, Oral, QOD          Assessment  Patient Active Problem List:     HTN (hypertension)     Anxiety     Tobacco abuse, episodic     Pericolonic abscess due to diverticulitis     Divert

## 2019-12-10 NOTE — PROGRESS NOTES
Received patient alert and orientated. Oxygen WNL on room air, . LARISSA with cpap . NR per tele. Voiding. Up with assistance and walker. Instructed to call for help, call light within reach. Pt updated on POC.  WCTM

## 2019-12-10 NOTE — RESPIRATORY THERAPY NOTE
Patient is refusing cpap and nebulizer treatments tonight. Told to inform RT if breathing becomes difficult. Will monitor throughout the night    Patient decided to wear cpap and continue with treatments early this morning.  BS are coarse, on cpap with 2L b

## 2019-12-11 NOTE — RESPIRATORY THERAPY NOTE
LARISSA Equipment Usage Summary :            Set Mode :AUTO CPAP W FLEX          Usage in Hours:8;54          90% Pressure (EPAP) : 8           90% Insp Pressure (IPAP);           AHI : 5.7          Supplemental Oxygen :  4    LPM

## 2019-12-11 NOTE — PLAN OF CARE
Patient saturating 95% on room air. Improved aeration after nebulizer. Will continue to assess and monitor.

## 2019-12-11 NOTE — PROGRESS NOTES
[facility]  Progress Note    Paras Bernal Patient Status:  Inpatient    1935 MRN [id number]   [facility] 5NW-A Attending Anel Hawk MD   [facility] Day # 6 PCP Hari Nichole MD     Subjective:  Paras Bernal is a(n) 81year old Labs   Lab 12/09/19  0634 12/10/19  0610 12/11/19  0623   * 111* 137*   BUN 13 15 18   CREATSERUM 0.80 0.76 0.96   GFRAA 79 84 63   GFRNAA 68 73 55*   CA 8.2* 8.4* 8.1*    134* 133*   K 3.9 3.9 3.5    99 98   CO2 31.0 29.0 31.0     Recen

## 2019-12-11 NOTE — PLAN OF CARE
Patient saturating 95% on room air. Improved aeration after Mask CPAP. Will continue to assess and monitor.

## 2019-12-11 NOTE — PLAN OF CARE
Problem: Patient/Family Goals  Goal: Patient/Family Long Term Goal  Description  Patient's Long Term Goal: \" go home\"     Interventions:  - Comply with plan of care   - See additional Care Plan goals for specific interventions   Outcome: Progressing  G patient safety including physical limitations  - Instruct pt to call for assistance with activity based on assessment  - Modify environment to reduce risk of injury  - Provide assistive devices as appropriate  - Consider OT/PT consult to assist with streng retention  Outcome: Progressing     Problem: METABOLIC/FLUID AND ELECTROLYTES - ADULT  Goal: Electrolytes maintained within normal limits  Description  INTERVENTIONS:  - Monitor labs and rhythm and assess patient for signs and symptoms of electrolyte imbal

## 2019-12-11 NOTE — PROGRESS NOTES
Multidisciplinary Discharge Rounds held 12/11/2019. Treatment team members present today include , , Charge Nurse, Nurse, RT, PT and Pharmacy caring for [employer].      Other care providers present:    Mobility Goal: Up in tanisha

## 2019-12-11 NOTE — PLAN OF CARE
Problem: Patient/Family Goals  Goal: Patient/Family Long Term Goal  Description  Patient's Long Term Goal: \" go home\"     Interventions:  - Comply with plan of care   - See additional Care Plan goals for specific interventions   Outcome: Progressing  G physical limitations  - Instruct pt to call for assistance with activity based on assessment  - Modify environment to reduce risk of injury  - Provide assistive devices as appropriate  - Consider OT/PT consult to assist with strengthening/mobility  - Encou Problem: METABOLIC/FLUID AND ELECTROLYTES - ADULT  Goal: Electrolytes maintained within normal limits  Description  INTERVENTIONS:  - Monitor labs and rhythm and assess patient for signs and symptoms of electrolyte imbalances  - Administer electrolyte re

## 2019-12-11 NOTE — PROGRESS NOTES
[facility]  Progress Note    Roxana Batres Patient Status:  Inpatient    1935 MRN [id number]   [facility] 5NW-A Attending Lurdes Tucker MD   Hosp Day # 5 PCP Jabier Garcia MD       SUBJECTIVE:  No CP, SOB, or N/V.   Rash under (DIFLUCAN) tab 100 mg, 100 mg, Oral, Daily  budesonide (PULMICORT) 0.5 MG/2ML nebulizer solution 0.5 mg, 0.5 mg, Nebulization, Daily  hydrocodone-homatropine (HYDROMET) 5-1.5 MG/5ML syrup 5 mL, 5 mL, Oral, Nightly PRN  dextromethorphan-guaiFENesin (ROBITUS 75-50 MG per tab 0.5 tablet, 0.5 tablet, Oral, QOD          Assessment  Patient Active Problem List:     HTN (hypertension)     Anxiety     Tobacco abuse, episodic     Pericolonic abscess due to diverticulitis     Diverticulosis     Fistula     Pericardial

## 2019-12-12 PROBLEM — G47.33 OSA ON CPAP: Status: ACTIVE | Noted: 2019-01-01

## 2019-12-12 PROBLEM — Z99.89 OSA ON CPAP: Status: ACTIVE | Noted: 2019-01-01

## 2019-12-12 NOTE — PROGRESS NOTES
[facility]  Progress Note    Zackary Merlin Patient Status:  Inpatient    1935 MRN [id number]   [facility] 5NW-A Attending Princess Darron MD   Hosp Day # 6 PCP Dexter Leal MD       SUBJECTIVE:  No CP, SOB, or N/V.   Feels nett Daily  budesonide (PULMICORT) 0.5 MG/2ML nebulizer solution 0.5 mg, 0.5 mg, Nebulization, Daily  hydrocodone-homatropine (HYDROMET) 5-1.5 MG/5ML syrup 5 mL, 5 mL, Oral, Nightly PRN  dextromethorphan-guaiFENesin (ROBITUSSIN-DM)  MG/5ML liquid 5 mL, 5 tablet, Oral, QOD          Assessment  Patient Active Problem List:     HTN (hypertension)     Anxiety     Tobacco abuse, episodic     Pericolonic abscess due to diverticulitis     Diverticulosis     Fistula     Pericardial effusion     Pericarditis     At

## 2019-12-12 NOTE — RESPIRATORY THERAPY NOTE
LARISSA : EQUIPMENT USE: DAILY SUMMARY                                            SET MODE: AUTO CPAP WITH CFLEX                                          USAGE IN HOURS:6:54                                          90%

## 2019-12-12 NOTE — PLAN OF CARE
Problem: Patient/Family Goals  Goal: Patient/Family Long Term Goal  Description  Patient's Long Term Goal: \" go home\"     Interventions:  - Comply with plan of care   - See additional Care Plan goals for specific interventions   Outcome: Progressing  G Educate pt/family on patient safety including physical limitations  - Instruct pt to call for assistance with activity based on assessment  - Modify environment to reduce risk of injury  - Provide assistive devices as appropriate  - Consider OT/PT consult of CO2 retention  Outcome: Progressing     Problem: METABOLIC/FLUID AND ELECTROLYTES - ADULT  Goal: Electrolytes maintained within normal limits  Description  INTERVENTIONS:  - Monitor labs and rhythm and assess patient for signs and symptoms of electrolyt

## 2019-12-12 NOTE — PROGRESS NOTES
[facility]  Progress Note    Arvind Cowan Patient Status:  Inpatient    1935 MRN [id number]   [facility] 5NW-A Attending Annalisa Garcia MD   [address] Day # 7 PCP Jerome Donovan MD     Subjective:  Arvind Cowan is a(n) 81year old pulmonary disease. 2. Large hiatus hernia. 3. Mild bilateral linear atelectasis or scarring. 4. Chronic increased interstitial markings.      Dictated by: Paul Eddy MD on 12/12/2019 at 15:59     Approved by: Paul Eddy MD on 12/12/2019 at 1 therapy.   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

## 2019-12-12 NOTE — DIETARY NOTE
[address] [place], [place] [address]maco     Admitting diagnosis:  COPD exacerbation ([facility] [address] [J44.1]    Ht: 165.1 cm (5' 5\")  Wt: 80.4 kg (177 lb 4.8 oz). This is 140 % of IBW  Body mass index is 29.5 kg/m².   IBW: 57 kg    Labs/Meds reviewed

## 2019-12-13 NOTE — PROGRESS NOTES
NURSING DISCHARGE NOTE    Discharged Home via Wheelchair. Accompanied by Family member and Support staff  Belongings Taken by patient/family. Pt and  received discharge instructions and education. Pt very anxious to leave. PIV removed.  [facility]

## 2019-12-13 NOTE — PLAN OF CARE
Problem: Patient/Family Goals  Goal: Patient/Family Long Term Goal  Description  Patient's Long Term Goal: \" go home\"     Interventions:  - Comply with plan of care   - See additional Care Plan goals for specific interventions   Outcome: Adequate for D falls.  - [place] fall precautions as indicated by assessment.  - Educate pt/family on patient safety including physical limitations  - Instruct pt to call for assistance with activity based on assessment  - Modify environment to reduce risk of injury  - Respiratory Therapy support as indicated  - Manage/alleviate anxiety  - Monitor for signs/symptoms of CO2 retention  Outcome: Adequate for Discharge     Problem: METABOLIC/FLUID AND ELECTROLYTES - ADULT  Goal: Electrolytes maintained within normal limits

## 2019-12-13 NOTE — RESPIRATORY THERAPY NOTE
LARISSA : EQUIPMENT USE: DAILY SUMMARY                                            SET MODE: AUTO CPAP WITH CFLEX                                          USAGE IN HOURS:6:18                                          90%

## 2019-12-13 NOTE — PROGRESS NOTES
[facility]  Progress Note    Jeniffer Laceypenny Patient Status:  Inpatient    1935 MRN [id number]   [facility] 5NW-A Attending Joshua MD Hunter   Hosp Day # 8 PCP Aminta Yeung MD       SUBJECTIVE:  No CP, SOB, or N/V.   IMPROVING nebulizer solution 0.5 mg, 0.5 mg, Nebulization, Daily  hydrocodone-homatropine (HYDROMET) 5-1.5 MG/5ML syrup 5 mL, 5 mL, Oral, Nightly PRN  dextromethorphan-guaiFENesin (ROBITUSSIN-DM)  MG/5ML liquid 5 mL, 5 mL, Oral, Q6H PRN  benzocaine-menthol (CE Active Problem List:     HTN (hypertension)     Anxiety     Tobacco abuse, episodic     Pericolonic abscess due to diverticulitis     Diverticulosis     Fistula     Pericardial effusion     Pericarditis     At risk for falling     Spondylosis of lumbar reg

## 2019-12-13 NOTE — PROGRESS NOTES
[facility]  Progress Note    Anderson Buerger Patient Status:  Inpatient    1935 MRN [id number]   [facility] 5NW-A Attending China Irving MD   [facility] Day # 8 PCP Eboni George MD     Subjective:  Anderson Buerger is a(n) 81year old 5. 16 5.50*   HGB 14.7 15.1 16.0   HCT 42.5 44.2 47.7   MCV 84.0 85.7 86.7   MCH 29.1 29.3 29.1   MCHC 34.6 34.2 33.5   RDW 14.5 14.8 14.7   NEPRELIM 6.63 6.74 6.78   WBC 7.4 7.6 8.8   .0 246.0 238.0     Recent Labs   Lab 12/11/19  0623 12/11/19  172

## 2019-12-13 NOTE — PLAN OF CARE
PROBLEM: COPD Exacerbation    ASSESSMENT: Pt A&OX4, prn restoril given per [facility] for sleep. VSS, afebrile. Maintaining O2 sats >94% on RA. . LARISSA CPAP. Scheduled nebs, PO steroids. IS and flutter encouraged. Tele, NSR. Refusing midnight vitals this shift. Utilize distraction and/or relaxation techniques  - Monitor for opioid side effects  - Notify MD/LIP if interventions unsuccessful or patient reports new pain  - Anticipate increased pain with activity and pre-medicate as appropriate  Outcome: Progressing for changes in mentation and behavior  - Position to facilitate oxygenation and minimize respiratory effort  - Oxygen supplementation based on oxygen saturation or ABGs  - Provide Smoking Cessation handout, if applicable  - Encourage broncho-pulmonary hygi

## 2019-12-14 NOTE — PROGRESS NOTES
[facility]  Progress Note    Valdo Hill Patient Status:  Inpatient    1935 MRN [id number]   [facility] 5NW-A Attending No att. providers found   [facility] Day # 8 PCP Krissy Rodriguez MD       SUBJECTIVE:  No CP, SOB, or N/V.   Looks a risk for falling     Spondylosis of lumbar region without myelopathy or radiculopathy     Primary osteoarthritis of both knees     COPD (chronic obstructive pulmonary disease) (HCC)     Post-herpetic polyneuropathy     Episode of recurrent major depressive

## 2019-12-19 NOTE — TELEPHONE ENCOUNTER
Patient called need related refill on Norco.  Therefore given Norco 5 mg to be taken 1 every 4-6 hours #150.   Dr. Clover Ricci

## 2020-01-01 ENCOUNTER — APPOINTMENT (OUTPATIENT)
Dept: GENERAL RADIOLOGY | Facility: HOSPITAL | Age: 85
DRG: 166 | End: 2020-01-01
Attending: INTERNAL MEDICINE
Payer: MEDICARE

## 2020-01-01 ENCOUNTER — APPOINTMENT (OUTPATIENT)
Dept: GENERAL RADIOLOGY | Facility: HOSPITAL | Age: 85
DRG: 463 | End: 2020-01-01
Attending: INTERNAL MEDICINE
Payer: MEDICARE

## 2020-01-01 ENCOUNTER — APPOINTMENT (OUTPATIENT)
Dept: GENERAL RADIOLOGY | Facility: HOSPITAL | Age: 85
DRG: 463 | End: 2020-01-01
Attending: EMERGENCY MEDICINE
Payer: MEDICARE

## 2020-01-01 ENCOUNTER — APPOINTMENT (OUTPATIENT)
Dept: CT IMAGING | Facility: HOSPITAL | Age: 85
End: 2020-01-01
Attending: EMERGENCY MEDICINE
Payer: MEDICARE

## 2020-01-01 ENCOUNTER — ANESTHESIA (OUTPATIENT)
Dept: SURGERY | Facility: HOSPITAL | Age: 85
DRG: 463 | End: 2020-01-01
Payer: MEDICARE

## 2020-01-01 ENCOUNTER — APPOINTMENT (OUTPATIENT)
Dept: ULTRASOUND IMAGING | Facility: HOSPITAL | Age: 85
DRG: 463 | End: 2020-01-01
Attending: HOSPITALIST
Payer: MEDICARE

## 2020-01-01 ENCOUNTER — APPOINTMENT (OUTPATIENT)
Dept: GENERAL RADIOLOGY | Facility: HOSPITAL | Age: 85
DRG: 463 | End: 2020-01-01
Attending: FAMILY MEDICINE
Payer: MEDICARE

## 2020-01-01 ENCOUNTER — APPOINTMENT (OUTPATIENT)
Dept: GENERAL RADIOLOGY | Facility: HOSPITAL | Age: 85
DRG: 166 | End: 2020-01-01
Attending: FAMILY MEDICINE
Payer: MEDICARE

## 2020-01-01 ENCOUNTER — ANESTHESIA EVENT (OUTPATIENT)
Dept: EMERGENCY DEPT | Facility: HOSPITAL | Age: 85
DRG: 463 | End: 2020-01-01
Payer: MEDICARE

## 2020-01-01 ENCOUNTER — APPOINTMENT (OUTPATIENT)
Dept: CT IMAGING | Facility: HOSPITAL | Age: 85
DRG: 463 | End: 2020-01-01
Attending: INTERNAL MEDICINE
Payer: MEDICARE

## 2020-01-01 ENCOUNTER — HOSPITAL ENCOUNTER (EMERGENCY)
Facility: HOSPITAL | Age: 85
Discharge: HOME | End: 2020-01-01
Attending: EMERGENCY MEDICINE
Payer: MEDICARE

## 2020-01-01 ENCOUNTER — APPOINTMENT (OUTPATIENT)
Dept: GENERAL RADIOLOGY | Facility: HOSPITAL | Age: 85
DRG: 463 | End: 2020-01-01
Attending: RADIOLOGY
Payer: MEDICARE

## 2020-01-01 ENCOUNTER — HOSPITAL ENCOUNTER (INPATIENT)
Facility: HOSPITAL | Age: 85
LOS: 7 days | Discharge: HOME | DRG: 166 | End: 2020-01-01
Attending: EMERGENCY MEDICINE | Admitting: FAMILY MEDICINE
Payer: MEDICARE

## 2020-01-01 ENCOUNTER — HOSPITAL ENCOUNTER (INPATIENT)
Facility: HOSPITAL | Age: 85
LOS: 22 days | DRG: 463 | End: 2020-01-01
Attending: EMERGENCY MEDICINE | Admitting: FAMILY MEDICINE
Payer: MEDICARE

## 2020-01-01 ENCOUNTER — OFFICE VISIT (OUTPATIENT)
Dept: RHEUMATOLOGY | Facility: CLINIC | Age: 85
End: 2020-01-01
Payer: MEDICARE

## 2020-01-01 ENCOUNTER — APPOINTMENT (OUTPATIENT)
Dept: INTERVENTIONAL RADIOLOGY/VASCULAR | Facility: HOSPITAL | Age: 85
DRG: 463 | End: 2020-01-01
Attending: INTERNAL MEDICINE
Payer: MEDICARE

## 2020-01-01 ENCOUNTER — ANESTHESIA (OUTPATIENT)
Dept: MEDSURG UNIT | Facility: HOSPITAL | Age: 85
DRG: 463 | End: 2020-01-01
Payer: MEDICARE

## 2020-01-01 ENCOUNTER — ANESTHESIA (OUTPATIENT)
Dept: EMERGENCY DEPT | Facility: HOSPITAL | Age: 85
DRG: 463 | End: 2020-01-01
Payer: MEDICARE

## 2020-01-01 ENCOUNTER — APPOINTMENT (OUTPATIENT)
Dept: CT IMAGING | Facility: HOSPITAL | Age: 85
DRG: 463 | End: 2020-01-01
Attending: HOSPITALIST
Payer: MEDICARE

## 2020-01-01 ENCOUNTER — TELEPHONE (OUTPATIENT)
Dept: ORTHOPEDICS CLINIC | Facility: CLINIC | Age: 85
End: 2020-01-01

## 2020-01-01 ENCOUNTER — ANESTHESIA EVENT (OUTPATIENT)
Dept: SURGERY | Facility: HOSPITAL | Age: 85
DRG: 463 | End: 2020-01-01
Payer: MEDICARE

## 2020-01-01 ENCOUNTER — APPOINTMENT (OUTPATIENT)
Dept: CT IMAGING | Facility: HOSPITAL | Age: 85
DRG: 166 | End: 2020-01-01
Attending: INTERNAL MEDICINE
Payer: MEDICARE

## 2020-01-01 ENCOUNTER — ANESTHESIA EVENT (OUTPATIENT)
Dept: MEDSURG UNIT | Facility: HOSPITAL | Age: 85
DRG: 463 | End: 2020-01-01
Payer: MEDICARE

## 2020-01-01 ENCOUNTER — HOSPITAL ENCOUNTER (OUTPATIENT)
Dept: GENERAL RADIOLOGY | Facility: HOSPITAL | Age: 85
Discharge: HOME | End: 2020-01-01
Attending: INTERNAL MEDICINE
Payer: MEDICARE

## 2020-01-01 ENCOUNTER — APPOINTMENT (OUTPATIENT)
Dept: GENERAL RADIOLOGY | Facility: HOSPITAL | Age: 85
DRG: 166 | End: 2020-01-01
Attending: EMERGENCY MEDICINE
Payer: MEDICARE

## 2020-01-01 ENCOUNTER — APPOINTMENT (OUTPATIENT)
Dept: GENERAL RADIOLOGY | Facility: HOSPITAL | Age: 85
End: 2020-01-01
Attending: EMERGENCY MEDICINE
Payer: MEDICARE

## 2020-01-01 ENCOUNTER — APPOINTMENT (OUTPATIENT)
Dept: CV DIAGNOSTICS | Facility: HOSPITAL | Age: 85
DRG: 463 | End: 2020-01-01
Attending: INTERNAL MEDICINE
Payer: MEDICARE

## 2020-01-01 ENCOUNTER — APPOINTMENT (OUTPATIENT)
Dept: CV DIAGNOSTICS | Facility: HOSPITAL | Age: 85
DRG: 463 | End: 2020-01-01
Attending: HOSPITALIST
Payer: MEDICARE

## 2020-01-01 ENCOUNTER — APPOINTMENT (OUTPATIENT)
Dept: GENERAL RADIOLOGY | Facility: HOSPITAL | Age: 85
DRG: 463 | End: 2020-01-01
Attending: Other
Payer: MEDICARE

## 2020-01-01 ENCOUNTER — APPOINTMENT (OUTPATIENT)
Dept: GENERAL RADIOLOGY | Facility: HOSPITAL | Age: 85
DRG: 463 | End: 2020-01-01
Attending: ORTHOPAEDIC SURGERY
Payer: MEDICARE

## 2020-01-01 ENCOUNTER — APPOINTMENT (OUTPATIENT)
Dept: CT IMAGING | Facility: HOSPITAL | Age: 85
DRG: 463 | End: 2020-01-01
Attending: FAMILY MEDICINE
Payer: MEDICARE

## 2020-01-01 VITALS
HEART RATE: 77 BPM | TEMPERATURE: 99 F | BODY MASS INDEX: 28.66 KG/M2 | OXYGEN SATURATION: 96 % | DIASTOLIC BLOOD PRESSURE: 72 MMHG | WEIGHT: 172 LBS | HEIGHT: 65 IN | RESPIRATION RATE: 18 BRPM | SYSTOLIC BLOOD PRESSURE: 116 MMHG

## 2020-01-01 VITALS
OXYGEN SATURATION: 99 % | WEIGHT: 175.69 LBS | DIASTOLIC BLOOD PRESSURE: 71 MMHG | BODY MASS INDEX: 29.27 KG/M2 | HEIGHT: 65 IN | SYSTOLIC BLOOD PRESSURE: 91 MMHG | TEMPERATURE: 99 F

## 2020-01-01 VITALS
BODY MASS INDEX: 29.41 KG/M2 | HEART RATE: 64 BPM | SYSTOLIC BLOOD PRESSURE: 105 MMHG | OXYGEN SATURATION: 95 % | DIASTOLIC BLOOD PRESSURE: 56 MMHG | HEIGHT: 65 IN | TEMPERATURE: 98 F | WEIGHT: 176.5 LBS | RESPIRATION RATE: 23 BRPM

## 2020-01-01 VITALS
HEIGHT: 65 IN | RESPIRATION RATE: 20 BRPM | SYSTOLIC BLOOD PRESSURE: 118 MMHG | HEART RATE: 80 BPM | BODY MASS INDEX: 28.66 KG/M2 | WEIGHT: 172 LBS | DIASTOLIC BLOOD PRESSURE: 70 MMHG

## 2020-01-01 DIAGNOSIS — E87.6 HYPOKALEMIA: ICD-10-CM

## 2020-01-01 DIAGNOSIS — F33.0 MILD EPISODE OF RECURRENT MAJOR DEPRESSIVE DISORDER (HCC): ICD-10-CM

## 2020-01-01 DIAGNOSIS — S20.211A CHEST WALL CONTUSION, RIGHT, INITIAL ENCOUNTER: Primary | ICD-10-CM

## 2020-01-01 DIAGNOSIS — E87.1 HYPONATREMIA: ICD-10-CM

## 2020-01-01 DIAGNOSIS — M17.11 PRIMARY OSTEOARTHRITIS OF RIGHT KNEE: Primary | ICD-10-CM

## 2020-01-01 DIAGNOSIS — J44.9 COPD (CHRONIC OBSTRUCTIVE PULMONARY DISEASE) (HCC): ICD-10-CM

## 2020-01-01 DIAGNOSIS — J44.1 COPD EXACERBATION (HCC): Primary | ICD-10-CM

## 2020-01-01 DIAGNOSIS — F41.9 ANXIETY: ICD-10-CM
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
## 2020-01-01 DIAGNOSIS — J44.1 COPD EXACERBATION (HCC): ICD-10-CM

## 2020-01-01 DIAGNOSIS — S72.002A CLOSED FRACTURE OF LEFT HIP, INITIAL ENCOUNTER (HCC): Primary | ICD-10-CM

## 2020-01-01 DIAGNOSIS — M51.36 DEGENERATIVE DISC DISEASE, LUMBAR: ICD-10-CM
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
## 2020-01-01 LAB
ALBUMIN SERPL-MCNC: 3 G/DL (ref 3.4–5)
ALBUMIN/GLOB SERPL: 0.8 {RATIO} (ref 1–2)
ALP LIVER SERPL-CCNC: 57 U/L (ref 55–142)
ALT SERPL-CCNC: 19 U/L (ref 13–56)
ANION GAP SERPL CALC-SCNC: 4 MMOL/L (ref 0–18)
APTT PPP: 27.2 SECONDS (ref 25.4–36.1)
AST SERPL-CCNC: 15 U/L (ref 15–37)
ATRIAL RATE: 74 BPM
BASOPHILS # BLD AUTO: 0.03 X10(3) UL (ref 0–0.2)
BASOPHILS NFR BLD AUTO: 0.4 %
BILIRUB SERPL-MCNC: 0.6 MG/DL (ref 0.1–2)
BUN BLD-MCNC: 11 MG/DL (ref 7–18)
BUN/CREAT SERPL: 14.5 (ref 10–20)
CALCIUM BLD-MCNC: 8.5 MG/DL (ref 8.5–10.1)
CHLORIDE SERPL-SCNC: 106 MMOL/L (ref 98–112)
CO2 SERPL-SCNC: 28 MMOL/L (ref 21–32)
CREAT BLD-MCNC: 0.76 MG/DL (ref 0.55–1.02)
DEPRECATED RDW RBC AUTO: 50.4 FL (ref 35.1–46.3)
EOSINOPHIL # BLD AUTO: 0.04 X10(3) UL (ref 0–0.7)
EOSINOPHIL NFR BLD AUTO: 0.6 %
ERYTHROCYTE [DISTWIDTH] IN BLOOD BY AUTOMATED COUNT: 14.9 % (ref 11–15)
GLOBULIN PLAS-MCNC: 3.7 G/DL (ref 2.8–4.4)
GLUCOSE BLD-MCNC: 100 MG/DL (ref 70–99)
HCT VFR BLD AUTO: 30.5 % (ref 35–48)
HGB BLD-MCNC: 9.9 G/DL (ref 12–16)
IMM GRANULOCYTES # BLD AUTO: 0.06 X10(3) UL (ref 0–1)
IMM GRANULOCYTES NFR BLD: 0.8 %
INR BLD: 1.16 (ref 0.9–1.1)
LYMPHOCYTES # BLD AUTO: 1.34 X10(3) UL (ref 1–4)
LYMPHOCYTES NFR BLD AUTO: 18.7 %
M PROTEIN MFR SERPL ELPH: 6.7 G/DL (ref 6.4–8.2)
MCH RBC QN AUTO: 30.1 PG (ref 26–34)
MCHC RBC AUTO-ENTMCNC: 32.5 G/DL (ref 31–37)
MCV RBC AUTO: 92.7 FL (ref 80–100)
MONOCYTES # BLD AUTO: 0.64 X10(3) UL (ref 0.1–1)
MONOCYTES NFR BLD AUTO: 8.9 %
NEUTROPHILS # BLD AUTO: 5.07 X10 (3) UL (ref 1.5–7.7)
NEUTROPHILS # BLD AUTO: 5.07 X10(3) UL (ref 1.5–7.7)
NEUTROPHILS NFR BLD AUTO: 70.6 %
OSMOLALITY SERPL CALC.SUM OF ELEC: 285 MOSM/KG (ref 275–295)
P AXIS: 22 DEGREES
P-R INTERVAL: 192 MS
PLATELET # BLD AUTO: 112 10(3)UL (ref 150–450)
POTASSIUM SERPL-SCNC: 4.2 MMOL/L (ref 3.5–5.1)
PSA SERPL DL<=0.01 NG/ML-MCNC: 15.3 SECONDS (ref 12.5–14.7)
Q-T INTERVAL: 398 MS
QRS DURATION: 82 MS
QTC CALCULATION (BEZET): 441 MS
R AXIS: 52 DEGREES
RBC # BLD AUTO: 3.29 X10(6)UL (ref 3.8–5.3)
SODIUM SERPL-SCNC: 138 MMOL/L (ref 136–145)
T AXIS: 53 DEGREES
VENTRICULAR RATE: 74 BPM
WBC # BLD AUTO: 7.2 X10(3) UL (ref 4–11)

## 2020-01-01 PROCEDURE — 71045 X-RAY EXAM CHEST 1 VIEW: CPT | Performed by: INTERNAL MEDICINE

## 2020-01-01 PROCEDURE — 85025 COMPLETE CBC W/AUTO DIFF WBC: CPT | Performed by: FAMILY MEDICINE

## 2020-01-01 PROCEDURE — 87071 CULTURE AEROBIC QUANT OTHER: CPT | Performed by: INTERNAL MEDICINE

## 2020-01-01 PROCEDURE — 87205 SMEAR GRAM STAIN: CPT | Performed by: INTERNAL MEDICINE

## 2020-01-01 PROCEDURE — 71045 X-RAY EXAM CHEST 1 VIEW: CPT | Performed by: FAMILY MEDICINE

## 2020-01-01 PROCEDURE — 3E0T3BZ INTRODUCTION OF ANESTHETIC AGENT INTO PERIPHERAL NERVES AND PLEXI, PERCUTANEOUS APPROACH: ICD-10-PCS | Performed by: ANESTHESIOLOGY

## 2020-01-01 PROCEDURE — B519YZZ FLUOROSCOPY OF INFERIOR VENA CAVA USING OTHER CONTRAST: ICD-10-PCS | Performed by: RADIOLOGY

## 2020-01-01 PROCEDURE — 99285 EMERGENCY DEPT VISIT HI MDM: CPT

## 2020-01-01 PROCEDURE — 73501 X-RAY EXAM HIP UNI 1 VIEW: CPT | Performed by: EMERGENCY MEDICINE

## 2020-01-01 PROCEDURE — 70450 CT HEAD/BRAIN W/O DYE: CPT | Performed by: EMERGENCY MEDICINE

## 2020-01-01 PROCEDURE — 99024 POSTOP FOLLOW-UP VISIT: CPT | Performed by: ORTHOPAEDIC SURGERY

## 2020-01-01 PROCEDURE — 94640 AIRWAY INHALATION TREATMENT: CPT

## 2020-01-01 PROCEDURE — 27236 TREAT THIGH FRACTURE: CPT | Performed by: ORTHOPAEDIC SURGERY

## 2020-01-01 PROCEDURE — 94669 MECHANICAL CHEST WALL OSCILL: CPT

## 2020-01-01 PROCEDURE — 94668 MNPJ CHEST WALL SBSQ: CPT

## 2020-01-01 PROCEDURE — 80053 COMPREHEN METABOLIC PANEL: CPT | Performed by: FAMILY MEDICINE

## 2020-01-01 PROCEDURE — 0B9C8ZX DRAINAGE OF RIGHT UPPER LUNG LOBE, VIA NATURAL OR ARTIFICIAL OPENING ENDOSCOPIC, DIAGNOSTIC: ICD-10-PCS | Performed by: INTERNAL MEDICINE

## 2020-01-01 PROCEDURE — 73501 X-RAY EXAM HIP UNI 1 VIEW: CPT | Performed by: ORTHOPAEDIC SURGERY

## 2020-01-01 PROCEDURE — 5A09457 ASSISTANCE WITH RESPIRATORY VENTILATION, 24-96 CONSECUTIVE HOURS, CONTINUOUS POSITIVE AIRWAY PRESSURE: ICD-10-PCS | Performed by: FAMILY MEDICINE

## 2020-01-01 PROCEDURE — 71046 X-RAY EXAM CHEST 2 VIEWS: CPT | Performed by: INTERNAL MEDICINE

## 2020-01-01 PROCEDURE — 94644 CONT INHLJ TX 1ST HOUR: CPT

## 2020-01-01 PROCEDURE — 93970 EXTREMITY STUDY: CPT | Performed by: HOSPITALIST

## 2020-01-01 PROCEDURE — 99153 MOD SED SAME PHYS/QHP EA: CPT | Performed by: INTERNAL MEDICINE

## 2020-01-01 PROCEDURE — 5A09357 ASSISTANCE WITH RESPIRATORY VENTILATION, LESS THAN 24 CONSECUTIVE HOURS, CONTINUOUS POSITIVE AIRWAY PRESSURE: ICD-10-PCS | Performed by: FAMILY MEDICINE

## 2020-01-01 PROCEDURE — 71250 CT THORAX DX C-: CPT | Performed by: INTERNAL MEDICINE

## 2020-01-01 PROCEDURE — 94667 MNPJ CHEST WALL 1ST: CPT

## 2020-01-01 PROCEDURE — 93306 TTE W/DOPPLER COMPLETE: CPT | Performed by: INTERNAL MEDICINE

## 2020-01-01 PROCEDURE — 81001 URINALYSIS AUTO W/SCOPE: CPT | Performed by: FAMILY MEDICINE

## 2020-01-01 PROCEDURE — B548ZZA ULTRASONOGRAPHY OF SUPERIOR VENA CAVA, GUIDANCE: ICD-10-PCS | Performed by: FAMILY MEDICINE

## 2020-01-01 PROCEDURE — 82803 BLOOD GASES ANY COMBINATION: CPT | Performed by: EMERGENCY MEDICINE

## 2020-01-01 PROCEDURE — 02HV33Z INSERTION OF INFUSION DEVICE INTO SUPERIOR VENA CAVA, PERCUTANEOUS APPROACH: ICD-10-PCS | Performed by: FAMILY MEDICINE

## 2020-01-01 PROCEDURE — 74177 CT ABD & PELVIS W/CONTRAST: CPT | Performed by: INTERNAL MEDICINE

## 2020-01-01 PROCEDURE — 85025 COMPLETE CBC W/AUTO DIFF WBC: CPT | Performed by: EMERGENCY MEDICINE

## 2020-01-01 PROCEDURE — 74018 RADEX ABDOMEN 1 VIEW: CPT | Performed by: RADIOLOGY

## 2020-01-01 PROCEDURE — 20610 DRAIN/INJ JOINT/BURSA W/O US: CPT | Performed by: INTERNAL MEDICINE

## 2020-01-01 PROCEDURE — 90792 PSYCH DIAG EVAL W/MED SRVCS: CPT | Performed by: OTHER

## 2020-01-01 PROCEDURE — 99233 SBSQ HOSP IP/OBS HIGH 50: CPT | Performed by: HOSPITALIST

## 2020-01-01 PROCEDURE — 71275 CT ANGIOGRAPHY CHEST: CPT | Performed by: INTERNAL MEDICINE

## 2020-01-01 PROCEDURE — 83605 ASSAY OF LACTIC ACID: CPT | Performed by: EMERGENCY MEDICINE

## 2020-01-01 PROCEDURE — 30233N1 TRANSFUSION OF NONAUTOLOGOUS RED BLOOD CELLS INTO PERIPHERAL VEIN, PERCUTANEOUS APPROACH: ICD-10-PCS | Performed by: FAMILY MEDICINE

## 2020-01-01 PROCEDURE — 99232 SBSQ HOSP IP/OBS MODERATE 35: CPT | Performed by: OTHER

## 2020-01-01 PROCEDURE — 93010 ELECTROCARDIOGRAM REPORT: CPT

## 2020-01-01 PROCEDURE — 99152 MOD SED SAME PHYS/QHP 5/>YRS: CPT | Performed by: INTERNAL MEDICINE

## 2020-01-01 PROCEDURE — 87186 SC STD MICRODIL/AGAR DIL: CPT | Performed by: INTERNAL MEDICINE

## 2020-01-01 PROCEDURE — 71275 CT ANGIOGRAPHY CHEST: CPT | Performed by: HOSPITALIST

## 2020-01-01 PROCEDURE — 71250 CT THORAX DX C-: CPT | Performed by: FAMILY MEDICINE

## 2020-01-01 PROCEDURE — 0SRS0J9 REPLACEMENT OF LEFT HIP JOINT, FEMORAL SURFACE WITH SYNTHETIC SUBSTITUTE, CEMENTED, OPEN APPROACH: ICD-10-PCS | Performed by: ORTHOPAEDIC SURGERY

## 2020-01-01 PROCEDURE — 05HB33Z INSERTION OF INFUSION DEVICE INTO RIGHT BASILIC VEIN, PERCUTANEOUS APPROACH: ICD-10-PCS | Performed by: FAMILY MEDICINE

## 2020-01-01 PROCEDURE — 96374 THER/PROPH/DIAG INJ IV PUSH: CPT

## 2020-01-01 PROCEDURE — 3E0F7SD INTRODUCTION OF NITRIC OXIDE GAS INTO RESPIRATORY TRACT, VIA NATURAL OR ARTIFICIAL OPENING: ICD-10-PCS | Performed by: FAMILY MEDICINE

## 2020-01-01 PROCEDURE — 89050 BODY FLUID CELL COUNT: CPT | Performed by: INTERNAL MEDICINE

## 2020-01-01 PROCEDURE — 87077 CULTURE AEROBIC IDENTIFY: CPT | Performed by: INTERNAL MEDICINE

## 2020-01-01 PROCEDURE — 88312 SPECIAL STAINS GROUP 1: CPT | Performed by: INTERNAL MEDICINE

## 2020-01-01 PROCEDURE — 5A1945Z RESPIRATORY VENTILATION, 24-96 CONSECUTIVE HOURS: ICD-10-PCS | Performed by: ANESTHESIOLOGY

## 2020-01-01 PROCEDURE — 76000 FLUOROSCOPY <1 HR PHYS/QHP: CPT | Performed by: INTERNAL MEDICINE

## 2020-01-01 PROCEDURE — 71101 X-RAY EXAM UNILAT RIBS/CHEST: CPT | Performed by: EMERGENCY MEDICINE

## 2020-01-01 PROCEDURE — 84100 ASSAY OF PHOSPHORUS: CPT | Performed by: FAMILY MEDICINE

## 2020-01-01 PROCEDURE — 82330 ASSAY OF CALCIUM: CPT | Performed by: EMERGENCY MEDICINE

## 2020-01-01 PROCEDURE — 85018 HEMOGLOBIN: CPT | Performed by: EMERGENCY MEDICINE

## 2020-01-01 PROCEDURE — 93306 TTE W/DOPPLER COMPLETE: CPT | Performed by: HOSPITALIST

## 2020-01-01 PROCEDURE — 36600 WITHDRAWAL OF ARTERIAL BLOOD: CPT | Performed by: EMERGENCY MEDICINE

## 2020-01-01 PROCEDURE — 89051 BODY FLUID CELL COUNT: CPT | Performed by: INTERNAL MEDICINE

## 2020-01-01 PROCEDURE — 83880 ASSAY OF NATRIURETIC PEPTIDE: CPT | Performed by: EMERGENCY MEDICINE

## 2020-01-01 PROCEDURE — 06H03DZ INSERTION OF INTRALUMINAL DEVICE INTO INFERIOR VENA CAVA, PERCUTANEOUS APPROACH: ICD-10-PCS | Performed by: RADIOLOGY

## 2020-01-01 PROCEDURE — 97161 PT EVAL LOW COMPLEX 20 MIN: CPT

## 2020-01-01 PROCEDURE — 73552 X-RAY EXAM OF FEMUR 2/>: CPT | Performed by: EMERGENCY MEDICINE

## 2020-01-01 PROCEDURE — 0KCP0ZZ EXTIRPATION OF MATTER FROM LEFT HIP MUSCLE, OPEN APPROACH: ICD-10-PCS | Performed by: ORTHOPAEDIC SURGERY

## 2020-01-01 PROCEDURE — 0WCQ8ZZ EXTIRPATION OF MATTER FROM RESPIRATORY TRACT, VIA NATURAL OR ARTIFICIAL OPENING ENDOSCOPIC: ICD-10-PCS | Performed by: INTERNAL MEDICINE

## 2020-01-01 PROCEDURE — 74176 CT ABD & PELVIS W/O CONTRAST: CPT | Performed by: INTERNAL MEDICINE

## 2020-01-01 PROCEDURE — B54MZZA ULTRASONOGRAPHY OF RIGHT UPPER EXTREMITY VEINS, GUIDANCE: ICD-10-PCS | Performed by: FAMILY MEDICINE

## 2020-01-01 PROCEDURE — 84132 ASSAY OF SERUM POTASSIUM: CPT | Performed by: EMERGENCY MEDICINE

## 2020-01-01 PROCEDURE — 80053 COMPREHEN METABOLIC PANEL: CPT | Performed by: EMERGENCY MEDICINE

## 2020-01-01 PROCEDURE — 93005 ELECTROCARDIOGRAM TRACING: CPT

## 2020-01-01 PROCEDURE — 0JBM0ZZ EXCISION OF LEFT UPPER LEG SUBCUTANEOUS TISSUE AND FASCIA, OPEN APPROACH: ICD-10-PCS | Performed by: ORTHOPAEDIC SURGERY

## 2020-01-01 PROCEDURE — 99221 1ST HOSP IP/OBS SF/LOW 40: CPT | Performed by: ORTHOPAEDIC SURGERY

## 2020-01-01 PROCEDURE — 71045 X-RAY EXAM CHEST 1 VIEW: CPT | Performed by: OTHER

## 2020-01-01 PROCEDURE — 97116 GAIT TRAINING THERAPY: CPT

## 2020-01-01 PROCEDURE — 94660 CPAP INITIATION&MGMT: CPT

## 2020-01-01 PROCEDURE — 0BH18EZ INSERTION OF ENDOTRACHEAL AIRWAY INTO TRACHEA, VIA NATURAL OR ARTIFICIAL OPENING ENDOSCOPIC: ICD-10-PCS | Performed by: ANESTHESIOLOGY

## 2020-01-01 PROCEDURE — 85610 PROTHROMBIN TIME: CPT | Performed by: INTERNAL MEDICINE

## 2020-01-01 PROCEDURE — 88104 CYTOPATH FL NONGYN SMEARS: CPT | Performed by: INTERNAL MEDICINE

## 2020-01-01 PROCEDURE — 71045 X-RAY EXAM CHEST 1 VIEW: CPT | Performed by: EMERGENCY MEDICINE

## 2020-01-01 PROCEDURE — 83050 HGB METHEMOGLOBIN QUAN: CPT | Performed by: EMERGENCY MEDICINE

## 2020-01-01 PROCEDURE — 82375 ASSAY CARBOXYHB QUANT: CPT | Performed by: EMERGENCY MEDICINE

## 2020-01-01 PROCEDURE — 72125 CT NECK SPINE W/O DYE: CPT | Performed by: EMERGENCY MEDICINE

## 2020-01-01 PROCEDURE — 84132 ASSAY OF SERUM POTASSIUM: CPT | Performed by: FAMILY MEDICINE

## 2020-01-01 PROCEDURE — 27236 TREAT THIGH FRACTURE: CPT | Performed by: NURSE PRACTITIONER

## 2020-01-01 PROCEDURE — 88305 TISSUE EXAM BY PATHOLOGIST: CPT | Performed by: INTERNAL MEDICINE

## 2020-01-01 PROCEDURE — 5A1955Z RESPIRATORY VENTILATION, GREATER THAN 96 CONSECUTIVE HOURS: ICD-10-PCS | Performed by: FAMILY MEDICINE

## 2020-01-01 PROCEDURE — 99282 EMERGENCY DEPT VISIT SF MDM: CPT | Performed by: NEUROLOGICAL SURGERY

## 2020-01-01 PROCEDURE — 84295 ASSAY OF SERUM SODIUM: CPT | Performed by: EMERGENCY MEDICINE

## 2020-01-01 DEVICE — VERSYS DISTAL CENTRALIZER 12MM: Type: IMPLANTABLE DEVICE | Site: HIP | Status: FUNCTIONAL

## 2020-01-01 DEVICE — KIT FEM BONE CEMENT RESTRICTOR: Type: IMPLANTABLE DEVICE | Site: HIP | Status: FUNCTIONAL

## 2020-01-01 DEVICE — IMPLANTABLE DEVICE: Type: IMPLANTABLE DEVICE | Site: HIP | Status: FUNCTIONAL

## 2020-01-01 DEVICE — CEMENT BONE RADIOPAQ HOWMEDICA: Type: IMPLANTABLE DEVICE | Site: HIP | Status: FUNCTIONAL

## 2020-01-01 RX ORDER — HYDROMORPHONE HYDROCHLORIDE 1 MG/ML
0.4 INJECTION, SOLUTION INTRAMUSCULAR; INTRAVENOUS; SUBCUTANEOUS ONCE
Status: COMPLETED | OUTPATIENT
Start: 2020-01-01 | End: 2020-01-01

## 2020-01-01 RX ORDER — ONDANSETRON 2 MG/ML
4 INJECTION INTRAMUSCULAR; INTRAVENOUS AS NEEDED
Status: DISCONTINUED | OUTPATIENT
Start: 2020-01-01 | End: 2020-01-01 | Stop reason: HOSPADM

## 2020-01-01 RX ORDER — ASPIRIN 81 MG/1
TABLET, CHEWABLE ORAL DAILY
COMMUNITY

## 2020-01-01 RX ORDER — DILTIAZEM HYDROCHLORIDE 120 MG/1
120 CAPSULE, EXTENDED RELEASE ORAL DAILY
Status: DISCONTINUED | OUTPATIENT
Start: 2020-01-01 | End: 2020-01-01

## 2020-01-01 RX ORDER — HYDROMORPHONE HYDROCHLORIDE 1 MG/ML
0.4 INJECTION, SOLUTION INTRAMUSCULAR; INTRAVENOUS; SUBCUTANEOUS EVERY 2 HOUR PRN
Status: DISCONTINUED | OUTPATIENT
Start: 2020-01-01 | End: 2020-01-01

## 2020-01-01 RX ORDER — METHYLPREDNISOLONE SODIUM SUCCINATE 40 MG/ML
40 INJECTION, POWDER, LYOPHILIZED, FOR SOLUTION INTRAMUSCULAR; INTRAVENOUS EVERY 12 HOURS
Status: DISCONTINUED | OUTPATIENT
Start: 2020-01-01 | End: 2020-01-01

## 2020-01-01 RX ORDER — ONDANSETRON 2 MG/ML
4 INJECTION INTRAMUSCULAR; INTRAVENOUS EVERY 6 HOURS PRN
Status: DISCONTINUED | OUTPATIENT
Start: 2020-01-01 | End: 2020-01-01

## 2020-01-01 RX ORDER — ACETYLCYSTEINE 200 MG/ML
2 SOLUTION ORAL; RESPIRATORY (INHALATION) EVERY 8 HOURS
Status: DISCONTINUED | OUTPATIENT
Start: 2020-01-01 | End: 2020-01-01

## 2020-01-01 RX ORDER — LORAZEPAM 1 MG/1
1 TABLET ORAL EVERY 6 HOURS PRN
Status: DISCONTINUED | OUTPATIENT
Start: 2020-01-01 | End: 2020-01-01

## 2020-01-01 RX ORDER — HYDROMORPHONE HYDROCHLORIDE 1 MG/ML
0.5 INJECTION, SOLUTION INTRAMUSCULAR; INTRAVENOUS; SUBCUTANEOUS EVERY 30 MIN PRN
Status: CANCELLED | OUTPATIENT
Start: 2020-01-01 | End: 2020-01-01

## 2020-01-01 RX ORDER — ACETAMINOPHEN 500 MG
1000 TABLET ORAL NIGHTLY
Status: DISCONTINUED | OUTPATIENT
Start: 2020-01-01 | End: 2020-01-01

## 2020-01-01 RX ORDER — FUROSEMIDE 10 MG/ML
40 INJECTION INTRAMUSCULAR; INTRAVENOUS EVERY 12 HOURS
Status: COMPLETED | OUTPATIENT
Start: 2020-01-01 | End: 2020-01-01

## 2020-01-01 RX ORDER — POTASSIUM CHLORIDE 29.8 MG/ML
40 INJECTION INTRAVENOUS ONCE
Status: COMPLETED | OUTPATIENT
Start: 2020-01-01 | End: 2020-01-01

## 2020-01-01 RX ORDER — FUROSEMIDE 10 MG/ML
INJECTION INTRAMUSCULAR; INTRAVENOUS
Status: COMPLETED
Start: 2020-01-01 | End: 2020-01-01

## 2020-01-01 RX ORDER — ACETAMINOPHEN 325 MG/1
TABLET ORAL
Status: COMPLETED
Start: 2020-01-01 | End: 2020-01-01

## 2020-01-01 RX ORDER — POLYETHYLENE GLYCOL 3350 17 G/17G
17 POWDER, FOR SOLUTION ORAL DAILY PRN
Status: DISCONTINUED | OUTPATIENT
Start: 2020-01-01 | End: 2020-01-01

## 2020-01-01 RX ORDER — FLUOXETINE HYDROCHLORIDE 20 MG/1
20 CAPSULE ORAL DAILY
Status: DISCONTINUED | OUTPATIENT
Start: 2020-01-01 | End: 2020-01-01

## 2020-01-01 RX ORDER — PANTOPRAZOLE SODIUM 40 MG/1
40 TABLET, DELAYED RELEASE ORAL
COMMUNITY

## 2020-01-01 RX ORDER — IPRATROPIUM BROMIDE AND ALBUTEROL SULFATE 2.5; .5 MG/3ML; MG/3ML
3 SOLUTION RESPIRATORY (INHALATION)
Status: DISCONTINUED | OUTPATIENT
Start: 2020-01-01 | End: 2020-01-01

## 2020-01-01 RX ORDER — MAGNESIUM OXIDE 400 MG (241.3 MG MAGNESIUM) TABLET
400 TABLET ONCE
Status: COMPLETED | OUTPATIENT
Start: 2020-01-01 | End: 2020-01-01

## 2020-01-01 RX ORDER — SODIUM CHLORIDE 30 MG/ML INHALATION SOLUTION 30 MG/ML
3 SOLUTION INHALANT 3 TIMES DAILY
Qty: 180 VIAL | Refills: 3 | Status: SHIPPED | OUTPATIENT
Start: 2020-01-01

## 2020-01-01 RX ORDER — SODIUM PHOSPHATE, DIBASIC AND SODIUM PHOSPHATE, MONOBASIC 7; 19 G/133ML; G/133ML
1 ENEMA RECTAL ONCE AS NEEDED
Status: DISCONTINUED | OUTPATIENT
Start: 2020-01-01 | End: 2020-01-01

## 2020-01-01 RX ORDER — HYDROMORPHONE HYDROCHLORIDE 1 MG/ML
0.2 INJECTION, SOLUTION INTRAMUSCULAR; INTRAVENOUS; SUBCUTANEOUS EVERY 2 HOUR PRN
Status: DISCONTINUED | OUTPATIENT
Start: 2020-01-01 | End: 2020-01-01

## 2020-01-01 RX ORDER — GUAIFENESIN 600 MG
1200 TABLET, EXTENDED RELEASE 12 HR ORAL 2 TIMES DAILY
Status: DISCONTINUED | OUTPATIENT
Start: 2020-01-01 | End: 2020-01-01

## 2020-01-01 RX ORDER — HEPARIN SODIUM AND DEXTROSE 10000; 5 [USP'U]/100ML; G/100ML
18 INJECTION INTRAVENOUS ONCE
Status: COMPLETED | OUTPATIENT
Start: 2020-01-01 | End: 2020-01-01

## 2020-01-01 RX ORDER — ACETAMINOPHEN 160 MG/5ML
650 SOLUTION ORAL EVERY 6 HOURS PRN
Status: DISCONTINUED | OUTPATIENT
Start: 2020-01-01 | End: 2020-01-01

## 2020-01-01 RX ORDER — HALOPERIDOL 5 MG/ML
INJECTION INTRAMUSCULAR
Status: COMPLETED
Start: 2020-01-01 | End: 2020-01-01

## 2020-01-01 RX ORDER — LORAZEPAM 0.5 MG/1
0.5 TABLET ORAL EVERY 6 HOURS PRN
Status: DISCONTINUED | OUTPATIENT
Start: 2020-01-01 | End: 2020-01-01

## 2020-01-01 RX ORDER — HEPARIN SODIUM 5000 [USP'U]/ML
INJECTION, SOLUTION INTRAVENOUS; SUBCUTANEOUS
Status: COMPLETED
Start: 2020-01-01 | End: 2020-01-01

## 2020-01-01 RX ORDER — ATORVASTATIN CALCIUM 10 MG/1
10 TABLET, FILM COATED ORAL DAILY
Status: DISCONTINUED | OUTPATIENT
Start: 2020-01-01 | End: 2020-01-01

## 2020-01-01 RX ORDER — ACETAMINOPHEN 325 MG/1
650 TABLET ORAL 3 TIMES DAILY
Status: DISCONTINUED | OUTPATIENT
Start: 2020-01-01 | End: 2020-01-01

## 2020-01-01 RX ORDER — SODIUM CHLORIDE 9 MG/ML
INJECTION, SOLUTION INTRAVENOUS CONTINUOUS
Status: DISCONTINUED | OUTPATIENT
Start: 2020-01-01 | End: 2020-01-01

## 2020-01-01 RX ORDER — MIDAZOLAM HYDROCHLORIDE 1 MG/ML
2 INJECTION INTRAMUSCULAR; INTRAVENOUS EVERY 5 MIN PRN
Status: DISCONTINUED | OUTPATIENT
Start: 2020-01-01 | End: 2020-01-01

## 2020-01-01 RX ORDER — FUROSEMIDE 10 MG/ML
20 INJECTION INTRAMUSCULAR; INTRAVENOUS ONCE
Status: COMPLETED | OUTPATIENT
Start: 2020-01-01 | End: 2020-01-01

## 2020-01-01 RX ORDER — MORPHINE SULFATE 4 MG/ML
4 INJECTION, SOLUTION INTRAMUSCULAR; INTRAVENOUS
Status: DISCONTINUED | OUTPATIENT
Start: 2020-01-01 | End: 2020-01-01

## 2020-01-01 RX ORDER — ONDANSETRON 2 MG/ML
4 INJECTION INTRAMUSCULAR; INTRAVENOUS ONCE AS NEEDED
Status: DISCONTINUED | OUTPATIENT
Start: 2020-01-01 | End: 2020-01-01 | Stop reason: HOSPADM

## 2020-01-01 RX ORDER — PANTOPRAZOLE SODIUM 40 MG/1
40 TABLET, DELAYED RELEASE ORAL
Status: DISCONTINUED | OUTPATIENT
Start: 2020-01-01 | End: 2020-01-01

## 2020-01-01 RX ORDER — LORAZEPAM 2 MG/ML
0.5 INJECTION INTRAMUSCULAR EVERY 30 MIN PRN
Status: DISCONTINUED | OUTPATIENT
Start: 2020-01-01 | End: 2020-01-01

## 2020-01-01 RX ORDER — HYDROCODONE BITARTRATE AND ACETAMINOPHEN 5; 325 MG/1; MG/1
1 TABLET ORAL EVERY 4 HOURS PRN
Qty: 150 TABLET | Refills: 0 | Status: SHIPPED | OUTPATIENT
Start: 2020-01-01 | End: 2020-01-01

## 2020-01-01 RX ORDER — HEPARIN SODIUM 5000 [USP'U]/ML
80 INJECTION INTRAVENOUS; SUBCUTANEOUS ONCE
Status: COMPLETED | OUTPATIENT
Start: 2020-01-01 | End: 2020-01-01

## 2020-01-01 RX ORDER — METHYLPREDNISOLONE SODIUM SUCCINATE 125 MG/2ML
60 INJECTION, POWDER, LYOPHILIZED, FOR SOLUTION INTRAMUSCULAR; INTRAVENOUS EVERY 8 HOURS
Status: DISCONTINUED | OUTPATIENT
Start: 2020-01-01 | End: 2020-01-01

## 2020-01-01 RX ORDER — LORAZEPAM 2 MG/ML
2 INJECTION INTRAMUSCULAR EVERY 30 MIN PRN
Status: DISCONTINUED | OUTPATIENT
Start: 2020-01-01 | End: 2020-01-01

## 2020-01-01 RX ORDER — CEFAZOLIN SODIUM/WATER 2 G/20 ML
2 SYRINGE (ML) INTRAVENOUS EVERY 8 HOURS
Status: COMPLETED | OUTPATIENT
Start: 2020-01-01 | End: 2020-01-01

## 2020-01-01 RX ORDER — FUROSEMIDE 10 MG/ML
40 INJECTION INTRAMUSCULAR; INTRAVENOUS
Status: COMPLETED | OUTPATIENT
Start: 2020-01-01 | End: 2020-01-01

## 2020-01-01 RX ORDER — HYDROMORPHONE HYDROCHLORIDE 4 MG/1
2 TABLET ORAL EVERY 2 HOUR PRN
Status: DISCONTINUED | OUTPATIENT
Start: 2020-01-01 | End: 2020-01-01

## 2020-01-01 RX ORDER — ENOXAPARIN SODIUM 100 MG/ML
30 INJECTION SUBCUTANEOUS DAILY
Status: DISCONTINUED | OUTPATIENT
Start: 2020-01-01 | End: 2020-01-01

## 2020-01-01 RX ORDER — OXYCODONE HYDROCHLORIDE 5 MG/1
2.5 TABLET ORAL EVERY 4 HOURS PRN
Status: DISCONTINUED | OUTPATIENT
Start: 2020-01-01 | End: 2020-01-01

## 2020-01-01 RX ORDER — TRAMADOL HYDROCHLORIDE 50 MG/1
50 TABLET ORAL EVERY 8 HOURS
Status: DISCONTINUED | OUTPATIENT
Start: 2020-01-01 | End: 2020-01-01

## 2020-01-01 RX ORDER — IPRATROPIUM BROMIDE AND ALBUTEROL SULFATE 2.5; .5 MG/3ML; MG/3ML
3 SOLUTION RESPIRATORY (INHALATION) EVERY 4 HOURS PRN
Status: DISCONTINUED | OUTPATIENT
Start: 2020-01-01 | End: 2020-01-01

## 2020-01-01 RX ORDER — PROCHLORPERAZINE EDISYLATE 5 MG/ML
10 INJECTION INTRAMUSCULAR; INTRAVENOUS EVERY 6 HOURS PRN
Status: DISCONTINUED | OUTPATIENT
Start: 2020-01-01 | End: 2020-01-01

## 2020-01-01 RX ORDER — TRANEXAMIC ACID 10 MG/ML
1000 INJECTION, SOLUTION INTRAVENOUS
Status: DISCONTINUED | OUTPATIENT
Start: 2020-01-01 | End: 2020-01-01 | Stop reason: HOSPADM

## 2020-01-01 RX ORDER — SODIUM CHLORIDE 9 MG/ML
INJECTION, SOLUTION INTRAVENOUS ONCE
Status: COMPLETED | OUTPATIENT
Start: 2020-01-01 | End: 2020-01-01

## 2020-01-01 RX ORDER — BUDESONIDE 0.5 MG/2ML
0.5 INHALANT ORAL 2 TIMES DAILY
Qty: 180 AMPULE | Refills: 3 | Status: SHIPPED | OUTPATIENT
Start: 2020-01-01

## 2020-01-01 RX ORDER — PREDNISONE 10 MG/1
TABLET ORAL
Qty: 40 TABLET | Refills: 0 | Status: SHIPPED | OUTPATIENT
Start: 2020-01-01

## 2020-01-01 RX ORDER — FUROSEMIDE 10 MG/ML
40 INJECTION INTRAMUSCULAR; INTRAVENOUS EVERY 12 HOURS
Status: DISCONTINUED | OUTPATIENT
Start: 2020-01-01 | End: 2020-01-01

## 2020-01-01 RX ORDER — CHOLECALCIFEROL (VITAMIN D3) 125 MCG
500 CAPSULE ORAL DAILY
Status: DISCONTINUED | OUTPATIENT
Start: 2020-01-01 | End: 2020-01-01

## 2020-01-01 RX ORDER — FUROSEMIDE 20 MG/1
20 TABLET ORAL DAILY
Status: DISCONTINUED | OUTPATIENT
Start: 2020-01-01 | End: 2020-01-01

## 2020-01-01 RX ORDER — ENOXAPARIN SODIUM 100 MG/ML
40 INJECTION SUBCUTANEOUS DAILY
Status: DISCONTINUED | OUTPATIENT
Start: 2020-01-01 | End: 2020-01-01

## 2020-01-01 RX ORDER — POTASSIUM CHLORIDE 20 MEQ/1
40 TABLET, EXTENDED RELEASE ORAL EVERY 4 HOURS
Status: COMPLETED | OUTPATIENT
Start: 2020-01-01 | End: 2020-01-01

## 2020-01-01 RX ORDER — SODIUM CHLORIDE 9 MG/ML
720 INJECTION, SOLUTION INTRAVENOUS ONCE
Status: DISCONTINUED | OUTPATIENT
Start: 2020-01-01 | End: 2020-01-01

## 2020-01-01 RX ORDER — TRAZODONE HYDROCHLORIDE 50 MG/1
50 TABLET ORAL NIGHTLY
COMMUNITY

## 2020-01-01 RX ORDER — MULTIVITAMIN WITH FOLIC ACID 400 MCG
1 TABLET ORAL DAILY
COMMUNITY

## 2020-01-01 RX ORDER — LORAZEPAM 0.5 MG/1
TABLET ORAL
Qty: 100 TABLET | Refills: 0 | Status: SHIPPED | OUTPATIENT
Start: 2020-01-01

## 2020-01-01 RX ORDER — ONDANSETRON 2 MG/ML
INJECTION INTRAMUSCULAR; INTRAVENOUS
Status: DISPENSED
Start: 2020-01-01 | End: 2020-01-01

## 2020-01-01 RX ORDER — ONDANSETRON 2 MG/ML
4 INJECTION INTRAMUSCULAR; INTRAVENOUS ONCE
Status: COMPLETED | OUTPATIENT
Start: 2020-01-01 | End: 2020-01-01

## 2020-01-01 RX ORDER — ONDANSETRON 2 MG/ML
4 INJECTION INTRAMUSCULAR; INTRAVENOUS EVERY 4 HOURS PRN
Status: CANCELLED | OUTPATIENT
Start: 2020-01-01

## 2020-01-01 RX ORDER — ETOMIDATE 2 MG/ML
INJECTION INTRAVENOUS
Status: DISCONTINUED
Start: 2020-01-01 | End: 2020-01-01 | Stop reason: WASHOUT

## 2020-01-01 RX ORDER — ACETAMINOPHEN 650 MG/1
650 SUPPOSITORY RECTAL EVERY 6 HOURS PRN
Status: DISCONTINUED | OUTPATIENT
Start: 2020-01-01 | End: 2020-01-01

## 2020-01-01 RX ORDER — ROCURONIUM BROMIDE 10 MG/ML
0.6 INJECTION, SOLUTION INTRAVENOUS ONCE
Status: COMPLETED | OUTPATIENT
Start: 2020-01-01 | End: 2020-01-01

## 2020-01-01 RX ORDER — METHYLPREDNISOLONE SODIUM SUCCINATE 125 MG/2ML
125 INJECTION, POWDER, LYOPHILIZED, FOR SOLUTION INTRAMUSCULAR; INTRAVENOUS ONCE
Status: COMPLETED | OUTPATIENT
Start: 2020-01-01 | End: 2020-01-01

## 2020-01-01 RX ORDER — POTASSIUM CHLORIDE 14.9 MG/ML
20 INJECTION INTRAVENOUS ONCE
Status: COMPLETED | OUTPATIENT
Start: 2020-01-01 | End: 2020-01-01

## 2020-01-01 RX ORDER — ALBUMIN (HUMAN) 12.5 G/50ML
25 SOLUTION INTRAVENOUS EVERY 6 HOURS
Status: COMPLETED | OUTPATIENT
Start: 2020-01-01 | End: 2020-01-01

## 2020-01-01 RX ORDER — ONDANSETRON 2 MG/ML
4 INJECTION INTRAMUSCULAR; INTRAVENOUS EVERY 4 HOURS PRN
Status: ACTIVE | OUTPATIENT
Start: 2020-01-01 | End: 2020-01-01

## 2020-01-01 RX ORDER — LORAZEPAM 1 MG/1
1 TABLET ORAL EVERY 4 HOURS PRN
Status: DISCONTINUED | OUTPATIENT
Start: 2020-01-01 | End: 2020-01-01

## 2020-01-01 RX ORDER — POTASSIUM CHLORIDE 20 MEQ/1
40 TABLET, EXTENDED RELEASE ORAL EVERY 4 HOURS
Status: DISCONTINUED | OUTPATIENT
Start: 2020-01-01 | End: 2020-01-01 | Stop reason: SDUPTHER

## 2020-01-01 RX ORDER — BISACODYL 10 MG
10 SUPPOSITORY, RECTAL RECTAL
Status: DISCONTINUED | OUTPATIENT
Start: 2020-01-01 | End: 2020-01-01

## 2020-01-01 RX ORDER — ACETAMINOPHEN 325 MG/1
650 TABLET ORAL EVERY 4 HOURS PRN
Status: DISCONTINUED | OUTPATIENT
Start: 2020-01-01 | End: 2020-01-01

## 2020-01-01 RX ORDER — CEFAZOLIN SODIUM 1 G/3ML
INJECTION, POWDER, FOR SOLUTION INTRAMUSCULAR; INTRAVENOUS AS NEEDED
Status: DISCONTINUED | OUTPATIENT
Start: 2020-01-01 | End: 2020-01-01 | Stop reason: SURG

## 2020-01-01 RX ORDER — ONDANSETRON 2 MG/ML
4 INJECTION INTRAMUSCULAR; INTRAVENOUS EVERY 4 HOURS PRN
Status: DISCONTINUED | OUTPATIENT
Start: 2020-01-01 | End: 2020-01-01

## 2020-01-01 RX ORDER — SODIUM CHLORIDE, SODIUM LACTATE, POTASSIUM CHLORIDE, CALCIUM CHLORIDE 600; 310; 30; 20 MG/100ML; MG/100ML; MG/100ML; MG/100ML
INJECTION, SOLUTION INTRAVENOUS CONTINUOUS
Status: DISCONTINUED | OUTPATIENT
Start: 2020-01-01 | End: 2020-01-01 | Stop reason: HOSPADM

## 2020-01-01 RX ORDER — ACETAMINOPHEN 500 MG
1000 TABLET ORAL EVERY 8 HOURS
Status: DISCONTINUED | OUTPATIENT
Start: 2020-01-01 | End: 2020-01-01

## 2020-01-01 RX ORDER — PREDNISONE 20 MG/1
40 TABLET ORAL
Status: DISCONTINUED | OUTPATIENT
Start: 2020-01-01 | End: 2020-01-01

## 2020-01-01 RX ORDER — MONTELUKAST SODIUM 5 MG/1
10 TABLET, CHEWABLE ORAL NIGHTLY
Status: DISCONTINUED | OUTPATIENT
Start: 2020-01-01 | End: 2020-01-01

## 2020-01-01 RX ORDER — HALOPERIDOL 5 MG/ML
1 INJECTION INTRAMUSCULAR ONCE
Status: COMPLETED | OUTPATIENT
Start: 2020-01-01 | End: 2020-01-01

## 2020-01-01 RX ORDER — FLUOXETINE HYDROCHLORIDE 20 MG/1
40 CAPSULE ORAL DAILY
Status: DISCONTINUED | OUTPATIENT
Start: 2020-01-01 | End: 2020-01-01

## 2020-01-01 RX ORDER — METHYLPREDNISOLONE ACETATE 40 MG/ML
40 INJECTION, SUSPENSION INTRA-ARTICULAR; INTRALESIONAL; INTRAMUSCULAR; SOFT TISSUE ONCE
Status: COMPLETED | OUTPATIENT
Start: 2020-01-01 | End: 2020-01-01

## 2020-01-01 RX ORDER — DOCUSATE SODIUM 100 MG/1
100 CAPSULE, LIQUID FILLED ORAL 2 TIMES DAILY
Status: DISCONTINUED | OUTPATIENT
Start: 2020-01-01 | End: 2020-01-01

## 2020-01-01 RX ORDER — MORPHINE SULFATE 4 MG/ML
2 INJECTION, SOLUTION INTRAMUSCULAR; INTRAVENOUS
Status: DISCONTINUED | OUTPATIENT
Start: 2020-01-01 | End: 2020-01-01

## 2020-01-01 RX ORDER — FUROSEMIDE 10 MG/ML
40 INJECTION INTRAMUSCULAR; INTRAVENOUS ONCE
Status: COMPLETED | OUTPATIENT
Start: 2020-01-01 | End: 2020-01-01

## 2020-01-01 RX ORDER — FLUOXETINE HYDROCHLORIDE 20 MG/1
20 CAPSULE ORAL DAILY
Qty: 30 CAPSULE | Refills: 5 | Status: ON HOLD | OUTPATIENT
Start: 2020-01-01 | End: 2020-01-01

## 2020-01-01 RX ORDER — IPRATROPIUM BROMIDE AND ALBUTEROL SULFATE 2.5; .5 MG/3ML; MG/3ML
3 SOLUTION RESPIRATORY (INHALATION) 3 TIMES DAILY
Qty: 180 VIAL | Refills: 1 | Status: SHIPPED | OUTPATIENT
Start: 2020-01-01

## 2020-01-01 RX ORDER — BUPIVACAINE HYDROCHLORIDE 7.5 MG/ML
INJECTION, SOLUTION INTRASPINAL AS NEEDED
Status: DISCONTINUED | OUTPATIENT
Start: 2020-01-01 | End: 2020-01-01 | Stop reason: SURG

## 2020-01-01 RX ORDER — TRIAMTERENE AND HYDROCHLOROTHIAZIDE 37.5; 25 MG/1; MG/1
1 CAPSULE ORAL DAILY
Status: DISCONTINUED | OUTPATIENT
Start: 2020-01-01 | End: 2020-01-01

## 2020-01-01 RX ORDER — 0.9 % SODIUM CHLORIDE 0.9 %
10 VIAL (ML) INJECTION AS NEEDED
Status: DISCONTINUED | OUTPATIENT
Start: 2020-01-01 | End: 2020-01-01

## 2020-01-01 RX ORDER — CHLORHEXIDINE GLUCONATE 0.12 MG/ML
15 RINSE ORAL
Status: DISCONTINUED | OUTPATIENT
Start: 2020-01-01 | End: 2020-01-01

## 2020-01-01 RX ORDER — ONDANSETRON 4 MG/1
4 TABLET, ORALLY DISINTEGRATING ORAL EVERY 6 HOURS PRN
Status: DISCONTINUED | OUTPATIENT
Start: 2020-01-01 | End: 2020-01-01

## 2020-01-01 RX ORDER — ALBUMIN (HUMAN) 12.5 G/50ML
25 SOLUTION INTRAVENOUS EVERY 12 HOURS
Status: COMPLETED | OUTPATIENT
Start: 2020-01-01 | End: 2020-01-01

## 2020-01-01 RX ORDER — MIDAZOLAM HYDROCHLORIDE 1 MG/ML
INJECTION INTRAMUSCULAR; INTRAVENOUS
Status: DISCONTINUED
Start: 2020-01-01 | End: 2020-01-01 | Stop reason: WASHOUT

## 2020-01-01 RX ORDER — LIDOCAINE HYDROCHLORIDE 10 MG/ML
2 INJECTION, SOLUTION EPIDURAL; INFILTRATION; INTRACAUDAL; PERINEURAL AS NEEDED
Status: DISCONTINUED | OUTPATIENT
Start: 2020-01-01 | End: 2020-01-01

## 2020-01-01 RX ORDER — MAGNESIUM SULFATE HEPTAHYDRATE 40 MG/ML
2 INJECTION, SOLUTION INTRAVENOUS ONCE
Status: COMPLETED | OUTPATIENT
Start: 2020-01-01 | End: 2020-01-01

## 2020-01-01 RX ORDER — DEXMEDETOMIDINE HYDROCHLORIDE 4 UG/ML
INJECTION, SOLUTION INTRAVENOUS CONTINUOUS
Status: DISCONTINUED | OUTPATIENT
Start: 2020-01-01 | End: 2020-01-01

## 2020-01-01 RX ORDER — ACETAMINOPHEN 160 MG/5ML
650 SOLUTION ORAL EVERY 4 HOURS PRN
Status: DISCONTINUED | OUTPATIENT
Start: 2020-01-01 | End: 2020-01-01

## 2020-01-01 RX ORDER — TRAZODONE HYDROCHLORIDE 50 MG/1
50 TABLET ORAL NIGHTLY
Status: DISCONTINUED | OUTPATIENT
Start: 2020-01-01 | End: 2020-01-01

## 2020-01-01 RX ORDER — LORAZEPAM 2 MG/ML
1 INJECTION INTRAMUSCULAR EVERY 6 HOURS PRN
Status: DISCONTINUED | OUTPATIENT
Start: 2020-01-01 | End: 2020-01-01

## 2020-01-01 RX ORDER — LORAZEPAM 0.5 MG/1
0.5 TABLET ORAL EVERY 4 HOURS PRN
Status: DISCONTINUED | OUTPATIENT
Start: 2020-01-01 | End: 2020-01-01

## 2020-01-01 RX ORDER — SODIUM CHLORIDE 30 MG/ML INHALATION SOLUTION 30 MG/ML
3 SOLUTION INHALANT EVERY 8 HOURS
Status: DISCONTINUED | OUTPATIENT
Start: 2020-01-01 | End: 2020-01-01

## 2020-01-01 RX ORDER — ASPIRIN 325 MG
325 TABLET ORAL 2 TIMES DAILY
Status: DISCONTINUED | OUTPATIENT
Start: 2020-01-01 | End: 2020-01-01

## 2020-01-01 RX ORDER — ALBUTEROL SULFATE 90 UG/1
4 AEROSOL, METERED RESPIRATORY (INHALATION) 4 TIMES DAILY
Status: DISCONTINUED | OUTPATIENT
Start: 2020-01-01 | End: 2020-01-01

## 2020-01-01 RX ORDER — HYDROCODONE BITARTRATE AND ACETAMINOPHEN 5; 325 MG/1; MG/1
1-2 TABLET ORAL EVERY 6 HOURS PRN
Qty: 20 TABLET | Refills: 0 | Status: SHIPPED | OUTPATIENT
Start: 2020-01-01 | End: 2020-01-01

## 2020-01-01 RX ORDER — ACETAMINOPHEN 325 MG/1
650 TABLET ORAL EVERY 6 HOURS PRN
Status: DISCONTINUED | OUTPATIENT
Start: 2020-01-01 | End: 2020-01-01

## 2020-01-01 RX ORDER — TRAMADOL HYDROCHLORIDE 50 MG/1
50 TABLET ORAL EVERY 6 HOURS PRN
Status: DISCONTINUED | OUTPATIENT
Start: 2020-01-01 | End: 2020-01-01

## 2020-01-01 RX ORDER — GUAIFENESIN 600 MG
1200 TABLET, EXTENDED RELEASE 12 HR ORAL 2 TIMES DAILY
Qty: 60 TABLET | Refills: 11 | Status: SHIPPED | OUTPATIENT
Start: 2020-01-01

## 2020-01-01 RX ORDER — BUDESONIDE 0.5 MG/2ML
0.5 INHALANT ORAL DAILY
Status: ON HOLD | COMMUNITY
End: 2020-01-01

## 2020-01-01 RX ORDER — HEPARIN SODIUM AND DEXTROSE 10000; 5 [USP'U]/100ML; G/100ML
INJECTION INTRAVENOUS CONTINUOUS
Status: DISCONTINUED | OUTPATIENT
Start: 2020-01-01 | End: 2020-01-01

## 2020-01-01 RX ORDER — POTASSIUM CHLORIDE 1.5 G/1.77G
40 POWDER, FOR SOLUTION ORAL ONCE
Status: COMPLETED | OUTPATIENT
Start: 2020-01-01 | End: 2020-01-01

## 2020-01-01 RX ORDER — GUAIFENESIN 600 MG
600 TABLET, EXTENDED RELEASE 12 HR ORAL 2 TIMES DAILY
Status: DISCONTINUED | OUTPATIENT
Start: 2020-01-01 | End: 2020-01-01

## 2020-01-01 RX ORDER — POTASSIUM CHLORIDE 1.5 G/1.77G
40 POWDER, FOR SOLUTION ORAL EVERY 4 HOURS
Status: COMPLETED | OUTPATIENT
Start: 2020-01-01 | End: 2020-01-01

## 2020-01-01 RX ORDER — METHYLPREDNISOLONE SODIUM SUCCINATE 125 MG/2ML
60 INJECTION, POWDER, LYOPHILIZED, FOR SOLUTION INTRAMUSCULAR; INTRAVENOUS EVERY 12 HOURS
Status: DISCONTINUED | OUTPATIENT
Start: 2020-01-01 | End: 2020-01-01

## 2020-01-01 RX ORDER — HYDROMORPHONE HYDROCHLORIDE 1 MG/ML
0.1 INJECTION, SOLUTION INTRAMUSCULAR; INTRAVENOUS; SUBCUTANEOUS EVERY 2 HOUR PRN
Status: DISCONTINUED | OUTPATIENT
Start: 2020-01-01 | End: 2020-01-01

## 2020-01-01 RX ORDER — SODIUM CHLORIDE 9 MG/ML
1000 INJECTION, SOLUTION INTRAVENOUS ONCE
Status: COMPLETED | OUTPATIENT
Start: 2020-01-01 | End: 2020-01-01

## 2020-01-01 RX ORDER — ROPIVACAINE HYDROCHLORIDE 5 MG/ML
30 INJECTION, SOLUTION EPIDURAL; INFILTRATION; PERINEURAL AS NEEDED
Status: DISCONTINUED | OUTPATIENT
Start: 2020-01-01 | End: 2020-01-01

## 2020-01-01 RX ORDER — SODIUM CHLORIDE, SODIUM LACTATE, POTASSIUM CHLORIDE, CALCIUM CHLORIDE 600; 310; 30; 20 MG/100ML; MG/100ML; MG/100ML; MG/100ML
INJECTION, SOLUTION INTRAVENOUS CONTINUOUS PRN
Status: DISCONTINUED | OUTPATIENT
Start: 2020-01-01 | End: 2020-01-01 | Stop reason: SURG

## 2020-01-01 RX ORDER — LIDOCAINE HYDROCHLORIDE 10 MG/ML
INJECTION, SOLUTION INFILTRATION; PERINEURAL
Status: COMPLETED
Start: 2020-01-01 | End: 2020-01-01

## 2020-01-01 RX ORDER — LORAZEPAM 2 MG/ML
0.5 INJECTION INTRAMUSCULAR EVERY 6 HOURS PRN
Status: DISCONTINUED | OUTPATIENT
Start: 2020-01-01 | End: 2020-01-01

## 2020-01-01 RX ORDER — SENNOSIDES 8.6 MG
17.2 TABLET ORAL NIGHTLY
Status: DISCONTINUED | OUTPATIENT
Start: 2020-01-01 | End: 2020-01-01

## 2020-01-01 RX ORDER — BENZONATATE 200 MG/1
200 CAPSULE ORAL 3 TIMES DAILY PRN
Status: DISCONTINUED | OUTPATIENT
Start: 2020-01-01 | End: 2020-01-01

## 2020-01-01 RX ORDER — BUSPIRONE HYDROCHLORIDE 5 MG/1
10 TABLET ORAL 2 TIMES DAILY
Status: DISCONTINUED | OUTPATIENT
Start: 2020-01-01 | End: 2020-01-01

## 2020-01-01 RX ORDER — LORAZEPAM 0.5 MG/1
0.5 TABLET ORAL 2 TIMES DAILY
Status: DISCONTINUED | OUTPATIENT
Start: 2020-01-01 | End: 2020-01-01

## 2020-01-01 RX ORDER — CEFUROXIME AXETIL 500 MG/1
500 TABLET ORAL 2 TIMES DAILY
Qty: 6 TABLET | Refills: 0 | Status: SHIPPED | OUTPATIENT
Start: 2020-01-01 | End: 2020-01-01

## 2020-01-01 RX ORDER — PROTAMINE SULFATE 10 MG/ML
8 INJECTION, SOLUTION INTRAVENOUS ONCE
Status: COMPLETED | OUTPATIENT
Start: 2020-01-01 | End: 2020-01-01

## 2020-01-01 RX ORDER — FUROSEMIDE 20 MG/1
20 TABLET ORAL DAILY
COMMUNITY

## 2020-01-01 RX ORDER — ERGOCALCIFEROL 1.25 MG/1
50000 CAPSULE ORAL
Status: DISCONTINUED | OUTPATIENT
Start: 2020-01-01 | End: 2020-01-01

## 2020-01-01 RX ORDER — PREDNISONE 5 MG/ML
25 SOLUTION ORAL 2 TIMES DAILY
Status: DISCONTINUED | OUTPATIENT
Start: 2020-01-01 | End: 2020-01-01

## 2020-01-01 RX ORDER — CODEINE PHOSPHATE AND GUAIFENESIN 10; 100 MG/5ML; MG/5ML
5 SOLUTION ORAL EVERY 4 HOURS PRN
Status: DISCONTINUED | OUTPATIENT
Start: 2020-01-01 | End: 2020-01-01

## 2020-01-01 RX ORDER — BENZONATATE 200 MG/1
200 CAPSULE ORAL
Status: DISCONTINUED | OUTPATIENT
Start: 2020-01-01 | End: 2020-01-01

## 2020-01-01 RX ORDER — OXYCODONE HYDROCHLORIDE 10 MG/1
10 TABLET ORAL EVERY 4 HOURS PRN
Status: DISCONTINUED | OUTPATIENT
Start: 2020-01-01 | End: 2020-01-01

## 2020-01-01 RX ORDER — MIDAZOLAM HYDROCHLORIDE 1 MG/ML
INJECTION INTRAMUSCULAR; INTRAVENOUS
Status: DISCONTINUED | OUTPATIENT
Start: 2020-01-01 | End: 2020-01-01

## 2020-01-01 RX ORDER — SODIUM CHLORIDE 9 MG/ML
INJECTION, SOLUTION INTRAVENOUS CONTINUOUS
Status: CANCELLED | OUTPATIENT
Start: 2020-01-01 | End: 2020-01-01

## 2020-01-01 RX ORDER — FUROSEMIDE 10 MG/ML
40 INJECTION INTRAMUSCULAR; INTRAVENOUS EVERY 6 HOURS
Status: COMPLETED | OUTPATIENT
Start: 2020-01-01 | End: 2020-01-01

## 2020-01-01 RX ORDER — NALOXONE HYDROCHLORIDE 0.4 MG/ML
80 INJECTION, SOLUTION INTRAMUSCULAR; INTRAVENOUS; SUBCUTANEOUS AS NEEDED
Status: DISCONTINUED | OUTPATIENT
Start: 2020-01-01 | End: 2020-01-01 | Stop reason: HOSPADM

## 2020-01-01 RX ORDER — METOCLOPRAMIDE HYDROCHLORIDE 5 MG/ML
5 INJECTION INTRAMUSCULAR; INTRAVENOUS EVERY 6 HOURS PRN
Status: ACTIVE | OUTPATIENT
Start: 2020-01-01 | End: 2020-01-01

## 2020-01-01 RX ORDER — OXYCODONE HYDROCHLORIDE 5 MG/1
5 TABLET ORAL EVERY 4 HOURS PRN
Status: DISCONTINUED | OUTPATIENT
Start: 2020-01-01 | End: 2020-01-01

## 2020-01-01 RX ORDER — FLUOXETINE HYDROCHLORIDE 40 MG/1
40 CAPSULE ORAL DAILY
Qty: 60 CAPSULE | Refills: 5 | Status: SHIPPED | OUTPATIENT
Start: 2020-01-01

## 2020-01-01 RX ORDER — ALBUMIN, HUMAN INJ 5% 5 %
500 SOLUTION INTRAVENOUS ONCE
Status: COMPLETED | OUTPATIENT
Start: 2020-01-01 | End: 2020-01-01

## 2020-01-01 RX ORDER — HYDROMORPHONE HYDROCHLORIDE 1 MG/ML
0.5 INJECTION, SOLUTION INTRAMUSCULAR; INTRAVENOUS; SUBCUTANEOUS ONCE
Status: COMPLETED | OUTPATIENT
Start: 2020-01-01 | End: 2020-01-01

## 2020-01-01 RX ORDER — SODIUM POLYSTYRENE SULFONATE 4.1 MEQ/G
30 POWDER, FOR SUSPENSION ORAL; RECTAL ONCE
Status: COMPLETED | OUTPATIENT
Start: 2020-01-01 | End: 2020-01-01

## 2020-01-01 RX ORDER — HYDROMORPHONE HYDROCHLORIDE 1 MG/ML
0.5 INJECTION, SOLUTION INTRAMUSCULAR; INTRAVENOUS; SUBCUTANEOUS EVERY 5 MIN PRN
Status: DISCONTINUED | OUTPATIENT
Start: 2020-01-01 | End: 2020-01-01 | Stop reason: HOSPADM

## 2020-01-01 RX ORDER — POTASSIUM CHLORIDE 20 MEQ/1
40 TABLET, EXTENDED RELEASE ORAL ONCE
Status: COMPLETED | OUTPATIENT
Start: 2020-01-01 | End: 2020-01-01

## 2020-01-01 RX ORDER — METHYLPREDNISOLONE SODIUM SUCCINATE 40 MG/ML
32 INJECTION, POWDER, LYOPHILIZED, FOR SOLUTION INTRAMUSCULAR; INTRAVENOUS EVERY 12 HOURS
Status: DISCONTINUED | OUTPATIENT
Start: 2020-01-01 | End: 2020-01-01

## 2020-01-01 RX ORDER — HYDROCHLOROTHIAZIDE 25 MG/1
25 TABLET ORAL DAILY
Qty: 90 TABLET | Refills: 3 | Status: SHIPPED | OUTPATIENT
Start: 2020-01-01

## 2020-01-01 RX ORDER — HYDROMORPHONE HYDROCHLORIDE 1 MG/ML
0.8 INJECTION, SOLUTION INTRAMUSCULAR; INTRAVENOUS; SUBCUTANEOUS EVERY 2 HOUR PRN
Status: DISCONTINUED | OUTPATIENT
Start: 2020-01-01 | End: 2020-01-01

## 2020-01-01 RX ORDER — POTASSIUM CHLORIDE 14.9 MG/ML
20 INJECTION INTRAVENOUS ONCE
Status: DISCONTINUED | OUTPATIENT
Start: 2020-01-01 | End: 2020-01-01

## 2020-01-01 RX ORDER — ACETYLCYSTEINE 200 MG/ML
2 SOLUTION ORAL; RESPIRATORY (INHALATION) ONCE
Status: COMPLETED | OUTPATIENT
Start: 2020-01-01 | End: 2020-01-01

## 2020-01-01 RX ORDER — MIDAZOLAM HYDROCHLORIDE 1 MG/ML
INJECTION INTRAMUSCULAR; INTRAVENOUS
Status: DISCONTINUED | OUTPATIENT
Start: 2020-01-01 | End: 2020-01-01 | Stop reason: HOSPADM

## 2020-01-01 RX ORDER — MAGNESIUM OXIDE 400 MG (241.3 MG MAGNESIUM) TABLET
3 TABLET NIGHTLY
Status: DISCONTINUED | OUTPATIENT
Start: 2020-01-01 | End: 2020-01-01

## 2020-01-01 RX ORDER — ALBUTEROL SULFATE 90 UG/1
8 AEROSOL, METERED RESPIRATORY (INHALATION) 4 TIMES DAILY
Status: DISCONTINUED | OUTPATIENT
Start: 2020-01-01 | End: 2020-01-01

## 2020-01-01 RX ORDER — ACETAMINOPHEN 500 MG
1000 TABLET ORAL NIGHTLY
COMMUNITY

## 2020-01-01 RX ORDER — DEXTROSE MONOHYDRATE 25 G/50ML
50 INJECTION, SOLUTION INTRAVENOUS
Status: DISCONTINUED | OUTPATIENT
Start: 2020-01-01 | End: 2020-01-01

## 2020-01-01 RX ORDER — FUROSEMIDE 10 MG/ML
60 INJECTION INTRAMUSCULAR; INTRAVENOUS EVERY 12 HOURS
Status: DISCONTINUED | OUTPATIENT
Start: 2020-01-01 | End: 2020-01-01

## 2020-01-01 RX ORDER — SODIUM CHLORIDE 30 MG/ML INHALATION SOLUTION 30 MG/ML
3 SOLUTION INHALANT
Status: DISCONTINUED | OUTPATIENT
Start: 2020-01-01 | End: 2020-01-01

## 2020-01-01 RX ORDER — METHYLPREDNISOLONE SODIUM SUCCINATE 40 MG/ML
40 INJECTION, POWDER, LYOPHILIZED, FOR SOLUTION INTRAMUSCULAR; INTRAVENOUS EVERY 12 HOURS
Status: COMPLETED | OUTPATIENT
Start: 2020-01-01 | End: 2020-01-01

## 2020-01-01 RX ORDER — METOCLOPRAMIDE HYDROCHLORIDE 5 MG/ML
10 INJECTION INTRAMUSCULAR; INTRAVENOUS EVERY 8 HOURS PRN
Status: DISCONTINUED | OUTPATIENT
Start: 2020-01-01 | End: 2020-01-01

## 2020-01-01 RX ORDER — MORPHINE SULFATE 4 MG/ML
2 INJECTION, SOLUTION INTRAMUSCULAR; INTRAVENOUS ONCE
Status: COMPLETED | OUTPATIENT
Start: 2020-01-01 | End: 2020-01-01

## 2020-01-01 RX ORDER — ROCURONIUM BROMIDE 10 MG/ML
INJECTION, SOLUTION INTRAVENOUS AS NEEDED
Status: DISCONTINUED | OUTPATIENT
Start: 2020-01-01 | End: 2020-01-01 | Stop reason: SURG

## 2020-01-01 RX ORDER — DILTIAZEM HYDROCHLORIDE 120 MG/1
120 CAPSULE, COATED, EXTENDED RELEASE ORAL DAILY
COMMUNITY

## 2020-01-01 RX ORDER — HALOPERIDOL 5 MG/ML
1 INJECTION INTRAMUSCULAR EVERY 2 HOUR PRN
Status: DISCONTINUED | OUTPATIENT
Start: 2020-01-01 | End: 2020-01-01

## 2020-01-01 RX ORDER — ASCORBIC ACID 500 MG
250 TABLET ORAL DAILY
Status: DISCONTINUED | OUTPATIENT
Start: 2020-01-01 | End: 2020-01-01

## 2020-01-01 RX ORDER — METHYLPREDNISOLONE SODIUM SUCCINATE 40 MG/ML
INJECTION, POWDER, LYOPHILIZED, FOR SOLUTION INTRAMUSCULAR; INTRAVENOUS
Status: DISPENSED
Start: 2020-01-01 | End: 2020-01-01

## 2020-01-01 RX ORDER — LORAZEPAM 2 MG/ML
1 INJECTION INTRAMUSCULAR EVERY 30 MIN PRN
Status: DISCONTINUED | OUTPATIENT
Start: 2020-01-01 | End: 2020-01-01

## 2020-01-01 RX ORDER — CALCIUM CARBONATE/VITAMIN D3 250-3.125
2 TABLET ORAL
Status: DISCONTINUED | OUTPATIENT
Start: 2020-01-01 | End: 2020-01-01

## 2020-01-01 RX ORDER — SCOLOPAMINE TRANSDERMAL SYSTEM 1 MG/1
1 PATCH, EXTENDED RELEASE TRANSDERMAL
Status: DISCONTINUED | OUTPATIENT
Start: 2020-01-01 | End: 2020-01-01

## 2020-01-01 RX ORDER — CODEINE PHOSPHATE AND GUAIFENESIN 10; 100 MG/5ML; MG/5ML
5 SOLUTION ORAL EVERY 4 HOURS PRN
COMMUNITY

## 2020-01-01 RX ORDER — ACETAMINOPHEN 10 MG/ML
1000 INJECTION, SOLUTION INTRAVENOUS EVERY 6 HOURS PRN
Status: DISCONTINUED | OUTPATIENT
Start: 2020-01-01 | End: 2020-01-01

## 2020-01-01 RX ORDER — DIPHENHYDRAMINE HYDROCHLORIDE 50 MG/ML
25 INJECTION INTRAMUSCULAR; INTRAVENOUS ONCE AS NEEDED
Status: ACTIVE | OUTPATIENT
Start: 2020-01-01 | End: 2020-01-01

## 2020-01-01 RX ORDER — POTASSIUM CHLORIDE 20 MEQ/1
40 TABLET, EXTENDED RELEASE ORAL EVERY 4 HOURS
Status: DISCONTINUED | OUTPATIENT
Start: 2020-01-01 | End: 2020-01-01

## 2020-01-01 RX ORDER — LORAZEPAM 0.5 MG/1
0.5 TABLET ORAL EVERY 6 HOURS PRN
Qty: 100 TABLET | Refills: 1 | Status: SHIPPED | OUTPATIENT
Start: 2020-01-01 | End: 2020-01-01

## 2020-01-01 RX ORDER — METHYLPREDNISOLONE SODIUM SUCCINATE 40 MG/ML
24 INJECTION, POWDER, LYOPHILIZED, FOR SOLUTION INTRAMUSCULAR; INTRAVENOUS EVERY 12 HOURS
Status: DISCONTINUED | OUTPATIENT
Start: 2020-01-01 | End: 2020-01-01

## 2020-01-01 RX ORDER — MIDAZOLAM HYDROCHLORIDE 1 MG/ML
1 INJECTION INTRAMUSCULAR; INTRAVENOUS EVERY 5 MIN PRN
Status: DISCONTINUED | OUTPATIENT
Start: 2020-01-01 | End: 2020-01-01 | Stop reason: HOSPADM

## 2020-01-01 RX ORDER — ATORVASTATIN CALCIUM 20 MG/1
20 TABLET, FILM COATED ORAL NIGHTLY
Status: DISCONTINUED | OUTPATIENT
Start: 2020-01-01 | End: 2020-01-01

## 2020-01-01 RX ADMIN — SODIUM CHLORIDE: 9 INJECTION, SOLUTION INTRAVENOUS at 14:33:00

## 2020-01-01 RX ADMIN — BUPIVACAINE HYDROCHLORIDE 0.9 ML: 7.5 INJECTION, SOLUTION INTRASPINAL at 14:56:00

## 2020-01-01 RX ADMIN — SODIUM CHLORIDE, SODIUM LACTATE, POTASSIUM CHLORIDE, CALCIUM CHLORIDE: 600; 310; 30; 20 INJECTION, SOLUTION INTRAVENOUS at 14:33:00

## 2020-01-01 RX ADMIN — METHYLPREDNISOLONE ACETATE 40 MG: 40 INJECTION, SUSPENSION INTRA-ARTICULAR; INTRALESIONAL; INTRAMUSCULAR; SOFT TISSUE at 13:57:00

## 2020-01-01 RX ADMIN — ROCURONIUM BROMIDE 40 MG: 10 INJECTION, SOLUTION INTRAVENOUS at 14:57:00

## 2020-01-01 RX ADMIN — SODIUM CHLORIDE, SODIUM LACTATE, POTASSIUM CHLORIDE, CALCIUM CHLORIDE: 600; 310; 30; 20 INJECTION, SOLUTION INTRAVENOUS at 14:41:00

## 2020-01-01 RX ADMIN — CEFAZOLIN SODIUM 2 G: 1 INJECTION, POWDER, FOR SOLUTION INTRAMUSCULAR; INTRAVENOUS at 14:52:00

## 2020-01-11 NOTE — DISCHARGE SUMMARY
[facility]  Discharge Summary    Maame Adler Patient Status:  Inpatient    1935 MRN [id number]   [facility] 5NW-A Attending No att. providers found   Hosp Day # 8 PCP Renaldo Litten, MD     Date of Admission: 2019 12:01 or Self Care    Discharge Condition: Stable    Discharge Medications: Discharge Medication List as of 12/13/2019 12:46 PM    START taking these medications    Montelukast Sodium 5 MG Oral Chew Tab  Chew 2 tablets (10 mg total) by mouth nightly., Normal, Tl Luis R-0    Cholecalciferol (VITAMIN D OR)  Take 1 capsule by mouth daily. , Historical    Ascorbic Acid (VITAMIN C OR)  Take 1 tablet by mouth daily. , Historical    Cyanocobalamin (VITAMIN B 12 OR)  Take 1 tablet by mouth daily.   , Historical    simvastatin (

## 2020-01-21 NOTE — ED NOTES
While this tech was est. IV access, pt passively stated that Brentonabi Joel is tired of this. I just want it to end. End it all. \" I asked what pt meant by this, she stated \"that if I had the means, I would kill myself.  Not shoot myself, but if you [provider] had

## 2020-01-21 NOTE — ED PROVIDER NOTES
Patient Seen in: [facility] Emergency Department      History   Patient presents with:  Eval-P  Fall  Dyspnea BACILIO SOB    Stated Complaint: BACILIO, fall today    HPI    81-year-old female comes to the hospital complaint of having difficulty with having HERNIA SURGERY  05/18/16   • HERNIA VENTRAL REPAIR N/A 5/18/2016    Performed by Emma Pablo MD at [address] [place] [address] - LEFT N/A 9/17/2014    Performed by Emma Pablo MD at [facility] MAIN OR   • LUMBAR FACET INJECTION Right Wheezing is noted bilaterally with increased expiratory phase as well as tenderness to her right chest wall without bruising noted  Abdomen: Soft nontender nondistended normal active bowel sounds without rebound, guarding or masses noted  Back nontender wi STATED HISTORY: (As transcribed by Technologist)  Shortness of breath. History of COPD. FINDINGS:  Stable chronic interstitial changes in the lungs. Mild scarring/atelectasis in the lower lungs. No pleural effusion or pneumothorax.   No lobar consolid C1.  There is apparent rotatory positioning of C2 relative to C1. Cervical spine imaging is recommended for further evaluation. Critical result findings were called by Dr. Tom Logan to Dr. Sadiq Juares on 1/21/2020 at [phone] hours. Read back was performed.    Jessie Hernandez odontoid is intact. There is diffuse degenerative disc disease. There is degenerative anterolisthesis of C3 on C4. The spine is osteoporotic. No other fracture.     Dictated by: Sherie Peralta MD on 1/21/2020 at 17:49     Approved by: Sherie Peralta MD (DILAUDID) 1 MG/ML injection 0.5 mg (0.5 mg Intravenous Given 1/21/20 1851)             MDM     Patient does have a history of having a fracture of her C1 and C2 in the past.  Dr. Kishore Barone was notified and came to see the patient and he compared the patient

## 2020-01-21 NOTE — ED NOTES
Pt stated to [facility] that she would kill herself if she had the means, dr whelan made aware and pt is now in seclusion, belongings removed and given to

## 2020-01-21 NOTE — ED NOTES
Pt belongings were locked up in safe lock security bag: [id number]. Items included: top, bottoms, shoes, jacket, and purse (with cell phone in side pocket; otherwise not inventoried). Pt belongings remain with  who is at pt's bedside.   is made

## 2020-01-21 NOTE — ED INITIAL ASSESSMENT (HPI)
Patient is A&Ox4. Patient reports falling over her walker into her sink onto her right side. Patient also reports a nonproductive cough and no fever since Tuesday. Increased difficulty of breathing after fall.

## 2020-01-22 NOTE — ED NOTES
Spoke to Supervisor advised pt is not to be admitted medically, [facility] will come to eval

## 2020-01-22 NOTE — CONSULTS
[facility]  Neurosurgery Consult  2020    Kamilleomaira Meadows Patient Status:  Emergency    1935 MRN [id number]   Location [address] [facility] Attending María Casanova MD   Hosp Day # 0 PCP Faviola Fang MD     REASON FOR CON MD ANTONIO at [facility] MAIN OR   • LAPAROSCOPIC COLON RESECTION - LEFT N/A 9/17/2014    Performed by Elizabeth Grigsby MD at [address] [place] [address]    • LUMBAR FACET INJECTION Right 6/22/2015    Performed by Aloysius Klinefelter, MD at [address] [place] [address]    • LUMBAR FACET INJECTION Left 6/15

## 2020-01-22 NOTE — ED NOTES
Report received from [facility]. Pt resting in bed, states, \"I have no pain when I'm laying in bed, if I move, goes up to 8. \" Pt denies BACILIO. Awaiting SCOTTY eval. Pt and  updated of the POC. All questions addressed. Pt denies need at this time.  Call l

## 2020-01-30 NOTE — ED NOTES
MD at bedside.
Patient called me to her room c/o SOB lungs with course rales with a moist wet non-productive cough  slight faint wheeze sat 92% on room air MD notified
Report called patient transported in stable condition
Report to Raeann Turk
Respiratory called for neb treatment
Allergy;

## 2020-02-04 PROBLEM — F33.9 RECURRENT MAJOR DEPRESSIVE DISORDER (HCC): Status: ACTIVE | Noted: 2017-12-05

## 2020-02-04 NOTE — PROGRESS NOTES
[facility]  Dr. Key Christensen Progress Note     Subjective:   Rahel Toney is a(n) 81year old female. Current complaints: Patient presents with:  Pain: Pain to right knee, feet are swollen, on prednisone for COPD  Feels depression.   Depressed about her c

## 2020-02-04 NOTE — PATIENT INSTRUCTIONS
Current plan due to severe pain in the right knee the right knee will be injected with cortisone. Hopefully that will give you some pain relief.   Additionally you can take celecoxib 200 mg once a day  For severe pain take Norco 5 mg also known as hydrocod

## 2020-02-04 NOTE — PROCEDURES
After obtaining consent, the patient's right knee was cleansed on the medial aspect with Betadine and alcohol wipes. Then the right knee was injected from the medial aspect with 40 mg of methylprednisolone and 1 cc of 1% lidocaine.   Patient tolerated the

## 2020-04-14 NOTE — ED PROVIDER NOTES
Patient Seen in: [facility] Emergency Department      History   Patient presents with:  Dyspnea BACILIO SOB    Stated Complaint: shortness of breath, hx of copd    78-year-old female with a history of COPD presents emergency department for worsening of MAIN OR   • LUMBAR FACET INJECTION Left 6/15/2015    Performed by Ligia Bragg MD at [facility] MAIN OR   • OOPHORECTOMY     • OTHER SURGICAL HISTORY      Drain for abdominal abscess   • RADIOFREQUENCY ABLATION OF SACROILIAC JOINT Right 2/9/2017    Performed Head: Normocephalic. Nose: Nose normal.      Mouth/Throat:      Mouth: Mucous membranes are dry. Eyes:      Extraocular Movements: Extraocular movements intact. Cardiovascular:      Rate and Rhythm: Normal rate and regular rhythm.    Pulmonary -----------         ------                     CBC W/ DIFFERENTIAL[[id number]]                              Final result                 Please view results for these tests on the individual orders.    SARS-COV-2 BY PCR   CBC W/ DIFFERENTIAL     EKG    Rate,

## 2020-04-14 NOTE — ED PROVIDER NOTES
I reviewed that chart and discussed the case. I have examined the patient and noted moderate dyspnea with speaking. Right leg mildly swollen compared with the left which patient states is chronic. Awake alert and answers questions appropriately.   No benjamin

## 2020-04-14 NOTE — CONSULTS
[facility]  Report of Consultation    Margaret Link Patient Status:  Emergency    1935 MRN [id number]   Location [address] [facility] Attending Severiano Ching, MD   Hosp Day # 0 PCP Shade Shaw MD     Reason for Consultat unspecified(486)    • Shortness of breath    • Visual impairment      Family History   Problem Relation Age of Onset   • Cancer Sister    • No Known Problems Father    • No Known Problems Mother       reports that she has quit smoking.  Her smoking use incl deformity. Heart: Regular rate and rhythm no murmurs noted though distant tones   Abdomen: soft, non-distended, no masses, no guarding, no     Rebound.   Mildly protuberant no obvious hepatosplenomegaly or ascites   Extremity: Remains with +1 edema on the Hyperglycemia     Bilateral leg edema     COPD exacerbation (HCC)     Vaginitis and vulvovaginitis     LARISSA on CPAP      Assessment:  · Dyspnea and hypoxia suspect primarily related to progressive volume loss for mucous plugging on the right as well as fl

## 2020-04-14 NOTE — ED INITIAL ASSESSMENT (HPI)
Pt states having a hard time catching her breath that has gotten worse over the past few weeks. Pt has a history of COPD and thinks shes having an exacerbation but was unable to get in to see her pulmonologist. Pt has a cough, no sputum.  Pt denies fevers,

## 2020-04-14 NOTE — ED NOTES
RT at bedside for ordered breathing treatment in A1 negative pressure room. Family member asked to step out during treatment.

## 2020-04-15 NOTE — PLAN OF CARE
Seen by Dr. Ting Mckeon results pending, if negative ok to transfer to medical floor & restart bipap, inhalers to be started per Boyd Bneitez RT, K+ replaced per protocol, 94% on 4 liters O2, IVF infusing, refused teds/scds,updated on plan of care.

## 2020-04-15 NOTE — HOME CARE LIAISON
TNL REC'D VERBAL REFERRAL TO MEET WITH PTNT AND OFFER [place] ON DC. PTNT ANXIOUS  AND RESTING AT THIS TIME.  TNL WILL FOLLOW UP AT A LATER TIME    THANKS  Gentry Gray

## 2020-04-15 NOTE — PLAN OF CARE
Report given to [facility], Dr. Jose Floyd paged for orders re:C-pap & IVF, awaiting callback, family notified of transfer to  402, Zithromax infusing Isolation dc'd per Dr. Brian Murrieta ,updated on plan of care.

## 2020-04-15 NOTE — PROGRESS NOTES
COVID negative test results called to dr. Jocelyn Huang.  Ok to transfer ok to [facility]

## 2020-04-15 NOTE — H&P
[facility]  History & Physical    Heriberto Clarissa Patient Status:  Inpatient    1935 MRN [id number]   [facility] 5NW-A Attending Herson Seay MD   Hosp Day # 0 PCP Evaristo Chopra MD     History of Present Illness:  Heriberto Romo 2/9/2017    Performed by Selvin Montano MD at [address] [place] [address] [facility] Right 2/16/2017    Performed by Selvin Mnotano MD at [facility] MAIN OR   • REMOVAL GALLBLADDER     • [address] Rfl: 1, 4/14/2020 at 0900  benzonatate 200 MG Oral Cap, Take 200 mg by mouth 3 (three) times daily as needed for cough. , Disp: , Rfl: , 4/14/2020 at 0900  ipratropium-albuterol 0.5-2.5 (3) MG/3ML Inhalation Solution, Take 3 mL by nebulization 4 (four) time Symmetric, no curvature, ROM normal, no CVA tenderness   Lungs:     Clear to auscultation bilaterally, respirations unlabored   Chest Wall:    No tenderness or deformity    Heart:    Regular rate and rhythm, S1 and S2 normal, no murmur, rub   or gallop   B shoulder pain     Primary osteoarthritis of right knee     Acute right-sided low back pain without sciatica     Hypokalemia     Hypoxia     Bronchiectasis (HCC)     Lung abnormality     Closed displaced fracture of first cervical vertebra (HCC)     Hypergl

## 2020-04-15 NOTE — PLAN OF CARE
Pt alert and oriented x4. VSS, afebrile. SOB with exertion. SpO2 within normal limits on 4L O2. Patient with productive cough. Anxiety has improved throughout the night, but is still present. Denies pain.  Up to the bedside commode with assist. Respiratory

## 2020-04-15 NOTE — RESPIRATORY THERAPY NOTE
Patient received on 4 LPM Nasal cannula saturating in the mid 90's. No distress at this time or labored breathing. Incentive spirometer, PEP and Vest therapy initiated at this time. Awaiting Albuterol MDI at this time (0040).  Diminished breath sounds bilat

## 2020-04-15 NOTE — PROGRESS NOTES
[facility] Pulmonary/Critical Care Progress Note     SUBJECTIVE/Interval history: All events, procedures, notes reviewed.  No acute events overnight, she feels her breathing is slightly better today, but still fatigued t 144*   BUN 7 8   CREATSERUM 0.81 0.62   GFRAA 78 96   GFRNAA 67 84   CA 9.2 8.3*    139   K 4.1 3.8    107   CO2 29.0 27.0     Recent Labs   Lab 04/14/20  1206 04/15/20  0644   RBC 4.75 4.26   HGB 14.2 12.5   HCT 43.6 39.0   MCV 91.8 91.5   MCH personally and compared to previous which is unchanged.   4/14 CT chest reviewed which shows obstruction of the distal BI and RLL, also with endobronchial debris of the trachea    Assessment:  · Dyspnea and hypoxia suspect primarily related to progressive v

## 2020-04-15 NOTE — CM/SW NOTE
04/15/20 1100   CM/SW Referral Data   Referral Source Physician   Reason for Referral Discharge planning   Informant Spouse   Social History   Suicidal Ideation/Depression/Mental Health Issues   (Hx anxiety)   Patient Info   Patient's Mental Status Aler

## 2020-04-16 NOTE — PLAN OF CARE
Pt A&O x4. VSS. Pt on RA at start of shift. Pt desat. To 86% on RA while walking with PT. Pt in room w/ sats at 87%-88% on RA. Pt placed on 1LNC and sats increased to 90-95%. Pt refusing to wear cpap during the day as instructed by Dr. Adalberto Kirkland.  Bronchoscopy

## 2020-04-16 NOTE — RESPIRATORY THERAPY NOTE
LARISSA - Equipment Use Daily Summary:                  . Set Mode: AUTO CPAP WITH C-FLEX                . Usage in hours: 0:06                . 90% Pressure (EPAP) level: 6.0                . 90% Insp. Pressure (IPAP): [facility]  AHI: 46.0

## 2020-04-16 NOTE — PROGRESS NOTES
[facility]  Progress Note    Anderson Buerger Patient Status:  Inpatient    1935 MRN [id number]   [facility] 4NW-A Attending China Irving MD   Hosp Day # 1 PCP Eboni George MD       SUBJECTIVE:  No CP , or N/V.   Sob   Hypoxia QID  ipratropium-albuterol (DUONEB) nebulizer solution 3 mL, 3 mL, Nebulization, Q4H PRN  methylPREDNISolone Sodium Succ (Solu-MEDROL) injection 40 mg, 40 mg, Intravenous, Q12H  Fluticasone-Umeclidin-Vilant (TRELEGY ELLIPTA) 100-62.5-25 MCG/INH inhaler 1 p pulmonary disease) (HCC)     Post-herpetic polyneuropathy     Recurrent major depressive disorder (HCC)     Degenerative disc disease, lumbar     Chronic right shoulder pain     Primary osteoarthritis of right knee     Acute right-sided low back pain witho

## 2020-04-16 NOTE — PROGRESS NOTES
Patient noted to have redness and excoriation between folds of abdomen and groin.  Jerri Bernabe Burn applied , will re-evaluate overnight and inform MD.

## 2020-04-16 NOTE — HOME CARE LIAISON
TNL met with ptnt at bedside to discuss the benefits of HH on dc. Ptnt declined stating she has her  at home. Ptnt asked for a brochure in case she changes her mind once she's home.   [facility]    Thanks  Pal Zelaya

## 2020-04-16 NOTE — PROGRESS NOTES
Alert, orientated x4. Ambulated to bathroom to void. Shortness of breath with exertion. Saturations 97% on room air. Placed on 2 liters for comfort. On tele-sinus rhythm. Gait was steady walking to bathroom. Denies pain.

## 2020-04-16 NOTE — CM/SW NOTE
Simon Scott from [facility] met with pt to offer [facility] but she declined.     Reji Wells LCSW  /Discharge Planner  [phone]

## 2020-04-16 NOTE — PROGRESS NOTES
[facility]  Progress Note    Chica Powers Patient Status:  Inpatient    1935 MRN [id number]   [facility] 4NW-A Attending Lorna Callaway MD   Hosp Day # 2 PCP Kalli Dawn MD     Subjective:  Chica Powers is a(n) 81year old results for input(s): PTP, INR, PTT in the last 168 hours.     Cultures: COVID-19 negative    Radiology:    Chest x-rays remain with significant right-sided volume loss left remains relatively clear        Medications reviewed     Assessment and Plan:   Rosie Grissom

## 2020-04-16 NOTE — PHYSICAL THERAPY NOTE
PHYSICAL THERAPY QUICK EVALUATION - INPATIENT    Room Number: 405/405-A  Evaluation Date: 4/16/2020  Presenting Problem: COPD exacerbation  Physician Order: PT Eval and Treat    Problem List  Principal Problem:    COPD exacerbation [facility])      Past [employer] GALLBLADDER     • SACROILIAC JOINT INJECTION BILATERAL Bilateral 6/29/2015    Performed by Deyvi Simms MD at [address] [place] [address]  Type of Home: House   Home Layout: One level                Lives With: Spouse     Patient Owned Equipment: Pt received supine in bed, agreeable to PT. Pt demos supine to sit ind. Sit to/from stand ind. Pt gait trained with RW.  RW too far in front of self with RW veering to the R at times. Pt generally used to using rollator rather than RW.   O2 sats at 86%

## 2020-04-17 NOTE — PLAN OF CARE
Resting in bed. Awake & alert,noted w/ anxiety. Ativan 0.5mg IV given earlier today w/ relief. SOB on exertion noted. Maintained on O2 at 1L per NC. SPO2 97%. NPO maintained for scheduled bronch at 1100. Most due am meds given w/ sips of water.  Assisted in

## 2020-04-17 NOTE — CDS QUERY
Impaired Gas Exchange  Alex Pinto  Dear Doctor:  Clinical information (provided below) suggests impaired gas exchange.  For accurate ICD-10-CM code assignment to reflect severity of illness and risk of mortality,  PLEASE (X) A well”    If you have any questions, please contact Clinical :  Josse Waddell RN, BSN, CDS   at # [phone]. Thank You!     THIS FORM IS A PERMANENT PART OF THE MEDICAL RECORD

## 2020-04-17 NOTE — PROGRESS NOTES
[facility]  Progress Note    Rahel Toney Patient Status:  Inpatient    1935 MRN [id number]   [facility] 4NW-A Attending Elda Vail MD   Hosp Day # 2 PCP Jane Oden MD       SUBJECTIVE:  No CP, SOB, or N/V.   Sob and ro mg, 0.5 mg, Oral, Q4H PRN  ipratropium-albuterol (DUONEB) nebulizer solution 3 mL, 3 mL, Nebulization, QID  ipratropium-albuterol (DUONEB) nebulizer solution 3 mL, 3 mL, Nebulization, Q4H PRN  Fluticasone-Umeclidin-Vilant (TRELEGY ELLIPTA) 100-62.5-25 MCG/ depressive disorder (HCC)     Degenerative disc disease, lumbar     Chronic right shoulder pain     Primary osteoarthritis of right knee     Acute right-sided low back pain without sciatica     Hypokalemia     Hypoxia     Bronchiectasis ([address]     Lung abno

## 2020-04-17 NOTE — CM/SW NOTE
SW met w/pt to address PHQ4. Pt stated, \"I am a nervous wreck. \" Pt stated she is always anxious, but especially anxious about being in the hospital. Provided support. Pt declined PHQ4 resources stating she has a supportive family.  Pt stated she is going

## 2020-04-17 NOTE — PLAN OF CARE
Patient is a/o x4 with anxiety at times. Prn ativan given with effective result. SOB on exertion. IS/ Flutter encouraged. Oxygen saturation above 91% on room air while sleeping. Patient refused to wear Cpap at night. Afebrile.  NPO for bronchoscopy tomorrow

## 2020-04-17 NOTE — PLAN OF CARE
Back from endo s/p bronchoscopy. Awake & alert,verbally responsive. Stable vital signs. With orders for repeat CXR. Will continue to monitor.

## 2020-04-17 NOTE — RESPIRATORY THERAPY NOTE
LARISSA : EQUIPMENT USE: DAILY SUMMARY                                            SET MODE: AUTO CPAP WITH CFLEX                                          USAGE IN HOURS:0:20                                          90%

## 2020-04-17 NOTE — OPERATIVE REPORT
[address] [facility] Patient Status:  Inpatient    1935 MRN [id number]   [facility] ENDOSCOPY Attending Chad Meyer MD   Hosp Day # 3 PCP Oleg Montilla MD       OPERATIVE REPORT:    DATE OF OPERATION: 2020     P The thick mucous plugs in the RUL were loosened with saline aliquots and then aspirated clear, many removed en bloc with the bronchoscope due their thickness and tenacity. Once cleared, the bronchoscope was wedged into the right upper lobe.  Keely eMraz

## 2020-04-17 NOTE — PROGRESS NOTES
[facility]  Progress Note    Bonifacio Bird Patient Status:  Inpatient    1935 MRN [id number]   [facility] 4NW-A Attending Jazmin Alexander MD   Hosp Day # 3 PCP Niesha Shepherd MD     Subjective:  Bonifacio Bird is a(n) 81year old .0 218.0 226.0     Recent Labs   Lab 04/15/20  0644 04/16/20  0645 04/17/20  0639   * 106* 119*   BUN 8 10 11   CREATSERUM 0.62 0.67 0.73   GFRAA 96 94 88   GFRNAA 84 82 76   CA 8.3* 8.6 8.6    137 138   K 3.8 4.1 4.0    105 103

## 2020-04-18 NOTE — PROGRESS NOTES
[facility]  Progress Note    Jeevan Logan Patient Status:  Inpatient    1935 MRN [id number]   [facility] 4NW-A Attending Raleigh Hughes MD   [facility] Day # 4 PCP Vimal Chris MD     Subjective:  Jeevan Logan is a(n) 81year old ABGPHT 7.43   [id number] 41   HVOYL6B 57*   ABGHCO3 27.0*   ABGBE 2.5*   TEMP 98.2   REN Positive   SITE Right Radial   DEV Room Air   THGB 14.2       Invalid input(s): CKTOTAL, TROPONINI, TROPONINT, CKMBINDEX    Cultures:  RUL BAL 4/17- 50-100K strep pn PRN  docusate sodium (COLACE) cap 100 mg, 100 mg, Oral, BID  PEG 3350 (MIRALAX) powder packet 17 g, 17 g, Oral, Daily PRN  magnesium hydroxide (MILK OF MAGNESIA) 400 MG/5ML suspension 30 mL, 30 mL, Oral, Daily PRN  bisacodyl (DULCOLAX) rectal suppository 1

## 2020-04-18 NOTE — PLAN OF CARE
1915 received chester in handoff. She is sitting at bedside getting breathing treatment. 02 1 liter per NC. Up with walker/SB assist to bathroom. She is SOB when returns to bed. Denies pain. Requested po Ativan to help sleep. Refused CPAP tonight.

## 2020-04-18 NOTE — PROGRESS NOTES
[facility]  Progress Note    Lakeisha Merida Patient Status:  Inpatient    1935 MRN [id number]   [facility] 4NW-A Attending Yudi Mata MD   Hosp Day # 3 PCP Rosa Martino MD       SUBJECTIVE:  No CP, SOB, or N/V.   Still sob mg, 1 mg, Intravenous, Q6H PRN  LORazepam (ATIVAN) tab 0.5 mg, 0.5 mg, Oral, Q4H PRN  ipratropium-albuterol (DUONEB) nebulizer solution 3 mL, 3 mL, Nebulization, QID  ipratropium-albuterol (DUONEB) nebulizer solution 3 mL, 3 mL, Nebulization, Q4H PRN  Flut [facility])     Post-herpetic polyneuropathy     Recurrent major depressive disorder (HCC)     Degenerative disc disease, lumbar     Chronic right shoulder pain     Primary osteoarthritis of right knee     Acute right-sided low back pain without sciatica     Hyp

## 2020-04-18 NOTE — RESPIRATORY THERAPY NOTE
LARISSA - Equipment Use Daily Summary:  · Set Mode   · Usage in hours: 0.3  · 90% Pressure (EPAP) level:   · 90% Insp Pressure (IPAP): 6  · AHI: 26  · Supplemental Oxygen:  · Comments:

## 2020-04-18 NOTE — PLAN OF CARE
Assumed care @ 0730. Patient with occasional non-productive cough, respirations non-labored, lungs sound diminished, O2 sat 94% on O2 1l/nc. Patient afebrile. Patient encouraged to use CPAP.

## 2020-04-19 NOTE — PLAN OF CARE
Pt alert/oriented x 4. Anxious throughout shift. LARISSA uses CPAP at night. 4-5L/NC during days shift = 96%. Congested productive cough noted, white sputum expectorated. CPT per respiratory. Seen by Dr. Elvis Ospina. Repeat bronchoscopy in AM 4/20.

## 2020-04-19 NOTE — PROGRESS NOTES
[facility]  Progress Note    Chica Powers Patient Status:  Inpatient    1935 MRN [id number]   [facility] 4NW-A Attending Lorna Callaway MD   Hosp Day # 5 PCP Kalli Dawn MD     Subjective:  Chica Powers is a(n) 81year old UXGSX3H 57*   ABGHCO3 27.0*   ABGBE 2.5*   TEMP 98.2   REN Positive   SITE Right Radial   DEV Room Air   THGB 14.2       Invalid input(s): CKTOTAL, TROPONINI, TROPONINT, CKMBINDEX    Cultures:  RUL BAL 4/17- 50-100K strep pneumoniae ([id number])    Radio (COLACE) cap 100 mg, 100 mg, Oral, BID  PEG 3350 (MIRALAX) powder packet 17 g, 17 g, Oral, Daily PRN  magnesium hydroxide (MILK OF MAGNESIA) 400 MG/5ML suspension 30 mL, 30 mL, Oral, Daily PRN  bisacodyl (DULCOLAX) rectal suppository 10 mg, 10 mg, Rectal,

## 2020-04-19 NOTE — PROGRESS NOTES
[facility]  Progress Note    Thelma Martinez Patient Status:  Inpatient    1935 MRN [id number]   [facility] 4NW-A Attending Hema Syed MD   Hosp Day # 4 PCP Tayler Linares MD       SUBJECTIVE:  No CP, SOB, or N/V.   Non compli mg, 0.5 mg, Oral, BID  Miconazole Nitrate 2 % powder, , Topical, [email or web address]  LORazepam (ATIVAN) injection 0.5 mg, 0.5 mg, Intravenous, Q6H PRN    Or  LORazepam (ATIVAN) injection 1 mg, 1 mg, Intravenous, Q6H PRN  ipratropium-albuterol (DUONEB) nebulizer osteoarthritis of both knees     COPD (chronic obstructive pulmonary disease) (HCC)     Post-herpetic polyneuropathy     Recurrent major depressive disorder (HCC)     Degenerative disc disease, lumbar     Chronic right shoulder pain     Primary osteoarthri

## 2020-04-19 NOTE — RESPIRATORY THERAPY NOTE
Q4 manual CPT with right side up done throughout the night. Found patient off CPAP and reminded/encouraged patient of the need for it. Strong non productive congested cough.

## 2020-04-19 NOTE — PLAN OF CARE
Assumed care of patient at [address]. Monitor on tele-NSR/SB. . Denies any pain. PRN ativan given prior BIPAP at night. Pt verbalized understanding importance of using BIPAP at night or when napping. Nebs, CPT.  CXR in am. Labs in am. Fall precautions in plac

## 2020-04-20 NOTE — OPERATIVE REPORT
[address] [place] [address] Patient Status:  Hospital Outpatient Surgery    1934 MRN [id number]   [facility] ENDOSCOPY Attending Sherrie Sevilla MD   Hosp Day # 6 PCP Angelina Mendez MD     OPERATIVE REPORT:     DATE OF [facility] nonbleeding and nonobstructing. The remainder of the trachea was normal and patent. moderate tracheomalacia was noted with coughing.  The left sided airways revealed nonobstructing tenacious white mucus in the left main stem, left upper and left lower lobe

## 2020-04-20 NOTE — PROGRESS NOTES
[facility]  Progress Note    Zohra Baez Patient Status:  Inpatient    1935 MRN [id number]   [facility] 4NW-A Attending Tracie Pollock MD   Hosp Day # 5 PCP Irving Clarke MD       SUBJECTIVE:  No CP, SOB, or N/V.   Patient co Q24H  predniSONE (DELTASONE) tab 40 mg, 40 mg, Oral, Daily with breakfast  ipratropium-albuterol (DUONEB) nebulizer solution 3 mL, 3 mL, Nebulization, QID  Miconazole Nitrate 2 % powder, , Topical, [email or web address]  LORazepam (ATIVAN) injection 0.5 mg, 0.5 mg Spondylosis of lumbar region without myelopathy or radiculopathy     Primary osteoarthritis of both knees     COPD (chronic obstructive pulmonary disease) (HCC)     Post-herpetic polyneuropathy     Recurrent major depressive disorder (HCC)     Degenerative

## 2020-04-20 NOTE — PROGRESS NOTES
[facility]  Progress Note    Chan Rivera Patient Status:  Inpatient    1935 MRN [id number]   [facility] 4NW-A Attending Garry Cain MD   [facility] Day # 6 PCP Prudence Olson MD     Subjective:  Chan Rivera is a(n ) 81year old 46.2   MCV 89.1 87.3 88.7   MCH 29.4 29.5 29.2   MCHC 32.9 33.8 32.9   RDW 12.1 11.9 11.9   NEPRELIM 4.27 4.48 4.22   WBC 5.5 5.9 7.5   .0 255.0 262.0     Recent Labs   Lab 04/18/20  0622 04/19/20  0532 04/19/20  1904 04/20/20  0643   * 99  - encouraged. · Start CTX- day #2 for bacterial pneumonia, sens pending  · Prednisone 40mg po daily day #1. Hope to wean 10mg every 3 days  · NPO x meds after 8am for potential bronchoscopy.   Repeat CXR in am.  · Plan for discharge tomorrow if she contin

## 2020-04-20 NOTE — PLAN OF CARE
Assumed care of patient at [facility]. Monitor on tele-NSR/SB.  on NC, CPAP at HS. HOB elevated. CPT, duonebs. NPO in am for bronch. CXR/labs in am. Fall precautions in place.  Will continue to monitor

## 2020-04-20 NOTE — RESPIRATORY THERAPY NOTE
LARISSA : EQUIPMENT USE: DAILY SUMMARY                                            SET MODE: AUTO CPAP WITH CFLEX                                          USAGE IN HOURS:2:31                                          90%

## 2020-04-21 NOTE — CDS QUERY
Impaired Gas Exchange  Felton Muse    Dear Doctor:  Clinical information (provided below) suggests impaired gas exchange.  For accurate ICD-10-CM code assignment to reflect severity of illness and risk of mortality,  PLEASE (X) therapeutic and to   assess airways as well”     If you have any questions, please contact Clinical :  Tyler Bhatia RN, BSN, CDS   at # [phone]. Thank You!     THIS FORM IS A PERMANENT PART OF THE MEDICAL RECORD

## 2020-04-21 NOTE — PROGRESS NOTES
[facility]  Progress Note    Rahel Toney Patient Status:  Inpatient    1935 MRN [id number]   [facility] Att[facility] Elda Vail MD   [facility] Day # 7 PCP Jane Oden MD     Subjective:  Rahel Toney is a(n) 81year old 04/21/20  0634   GLU 99  --  84 103*   BUN 20*  --  20* 20*   CREATSERUM 0.79  --  0.81 0.71   GFRAA 80  --  78 91   GFRNAA 69  --  67 79   CA 8.7  --  8.3* 9.0   *  --  133* 134*   K 3.6 4.5 3.9 5.6*     --  100 101   CO2 29.0  --  27.0 28.0

## 2020-04-21 NOTE — PROGRESS NOTES
[facility]  Progress Note    Taisha Gastelum Patient Status:  Inpatient    1935 MRN [id number]   [facility] 4NW-A Attending Michelle Garcia MD   Hosp Day # 6 PCP Michael Levi MD       SUBJECTIVE:  No CP, SOB, or N/V.   Remains SO AP PORTABLE  (CPT=71045), 4/18/2020, 5:35 AM.     INDICATIONS:  Follow-up infiltrate.  SOB     PATIENT STATED HISTORY: (As transcribed by Technologist)  Patient offered no additional history at this time.       FINDINGS: Low lung volumes especially the rig 500 mcg, Oral, Daily  diltiazem (CARDIZEM CD) 24 hr cap 120 mg, 120 mg, Oral, Daily  Montelukast Sodium (SINGULAIR) chewable tab 10 mg, 10 mg, Oral, Nightly  Pantoprazole Sodium (PROTONIX) EC tab 40 mg, 40 mg, Oral, QAM AC  atorvastatin (LIPITOR) tab 10 mg hiatal hernia      Non compliance   Will start ativan schedule bid to help with compliance  Refusing bi pap  cxr improved today but   Need nebs   Need pulm toilet   Advised she cannot refuse tx   Will never improve  To follow      Prepare for bronch  Non

## 2020-04-21 NOTE — PLAN OF CARE
Alert and oriented x4. VSS. Afebrile. No c/o pain or SOB. Was wearing CPAP while asleep but took it off. Ativan given per MAR. NSR on tele. Ambulating to the bathroom with no issues. X-ray in the morning. Resting comfortably. Will continue to monitor.

## 2020-04-21 NOTE — DIETARY NOTE
[facility]     Admitting diagnosis:  COPD exacerbation ([facility] [address] [J44.1]    Ht: 165.1 cm (5' 5\")  Wt: 80.1 kg (176 lb 8 oz). This is 141 % of IBW  Body mass index is 29.37 kg/m².   IBW: 56.8 kg    Wt Readings from Last

## 2020-04-21 NOTE — RESPIRATORY THERAPY NOTE
LARISSA - Equipment Use Daily Summary:                  . Set Mode: AUTO CPAP WITH C-FLEX                . Usage in hours: 0:54                . 90% Pressure (EPAP) level: 7.0                . 90% Insp. Pressure (IPAP): Christiano Bruce  AHI: 5.8

## 2020-04-21 NOTE — PLAN OF CARE
NURSING DISCHARGE NOTE    Discharged Home via Wheelchair. Accompanied by Family member  Belongings Taken by patient/family. Pt okayed for discharge. VSS.  RA. X-ray this AM. Pt verbalized understanding on discharge instructions, medications and foll

## 2020-04-22 NOTE — PROGRESS NOTES
[facility]  Progress Note    Carrillo Morgan Patient Status:  Inpatient    1935 MRN [id number]   [facility] 4NW-A Attending No att. providers found   [facility] Day # 7 PCP Kevin Neal MD       SUBJECTIVE:  No CP, SOB, or N/V.   Improve of both knees     COPD (chronic obstructive pulmonary disease) (HCC)     Post-herpetic polyneuropathy     Recurrent major depressive disorder (HCC)     Degenerative disc disease, lumbar     Chronic right shoulder pain     Primary osteoarthritis of right kn

## 2020-05-12 NOTE — DISCHARGE SUMMARY
[facility]  Discharge Summary    Chan Rivera Patient Status:  Inpatient    1935 MRN [id number]   [facility] 4NW-A Attending No att. providers found   [facility] Day # 7 PCP Prudence Olson MD     Date of Admission: 2020 11:34 Discharge Medication List as of 4/21/2020 12:10 PM    START taking these medications    Cefuroxime Axetil 500 MG Oral Tab  Take 1 tablet (500 mg total) by mouth 2 (two) times daily for 6 doses. , Normal, Disp-6 tablet, R-0    predniSONE 10 MG Oral Tab  4 ta Historical    Pantoprazole Sodium 40 MG Oral Tab EC  Take 40 mg by mouth every morning before breakfast., Historical    LORazepam 0.5 MG Oral Tab  Take 1 tablet (0.5 mg total) by mouth every 6 (six) hours as needed for Anxiety., Normal, Disp-100 tablet, R-

## 2020-05-28 PROBLEM — S72.002A HIP FRACTURE REQUIRING OPERATIVE REPAIR, LEFT, CLOSED, INITIAL ENCOUNTER (HCC): Status: ACTIVE | Noted: 2020-01-01

## 2020-05-28 PROBLEM — S72.001A HIP FRACTURE, RIGHT, CLOSED, INITIAL ENCOUNTER (HCC): Status: ACTIVE | Noted: 2020-01-01

## 2020-05-28 PROBLEM — S72.002A CLOSED FRACTURE OF LEFT HIP, INITIAL ENCOUNTER (HCC): Status: ACTIVE | Noted: 2020-01-01
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
## 2020-05-28 PROBLEM — E87.1 HYPONATREMIA: Status: ACTIVE | Noted: 2020-01-01
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
## 2020-05-28 NOTE — ANESTHESIA PREPROCEDURE EVALUATION
PRE-OP EVALUATION    Patient Name: Maame Adler    Pre-op Diagnosis: Left Hip Fracture    Procedure(s):  LEFT HIP CEMENTED HEMIARTHROPLASTY    Surgeon(s) and Role:     Joseph Rodriguez MD - Primary    Pre-op vitals reviewed.   Temp: 99 °F (37.2 °C)  Pulse: (PROTONIX) 40 mg in Sodium Chloride 0.9 % 10 mL IV push, 40 mg, Intravenous, QAM AC    Or  Pantoprazole Sodium (PROTONIX) EC tab 40 mg, 40 mg, Oral, QAM AC  Fluticasone-Umeclidin-Vilant (TRELEGY ELLIPTA) 100-62.5-25 MCG/INH inhaler 1 puff, 1 puff, Inhalati treatment lying on your left side, Disp: 180 vial, Rfl: 1  guaiFENesin  MG Oral Tablet 12 Hr, Take 2 tablets (1,200 mg total) by mouth 2 (two) times daily. , Disp: 60 tablet, Rfl: 11  sodium chloride 3 % Inhalation Nebu Soln, Take 3 mL by nebulization Pericolonic abscess due to diverticulitis     Diverticulosis     Fistula     Pericardial effusion     Pericarditis     At risk for falling     Spondylosis of lumbar region without myelopathy or radiculopathy     Primary osteoarthritis of both knees      Damion Mcknight MD at [facility] ENDOSCOPY   • [place]     • HERNIA SURGERY  05/18/16   • HERNIA VENTRAL REPAIR N/A 5/18/2016    Performed by Magnolia Chatterjee MD at [address] [place] [address] - LEFT N/A 9/17/2014    Performed by Claire Kowalski

## 2020-05-28 NOTE — ED PROVIDER NOTES
Patient Seen in: [facility] Emergency Department      History   Patient presents with:  Fall    Stated Complaint: fall, left leg pain    HPI    Fragile elderly woman with significant hx of COPD presents after fall while attempting to get inher bed t OR   • LUMBAR FACET INJECTION Left 6/15/2015    Performed by Lugenia Cowden, MD at [facility] MAIN OR   • OOPHORECTOMY     • OTHER SURGICAL HISTORY      Drain for abdominal abscess   • RADIOFREQUENCY ABLATION OF SACROILIAC JOINT Right 2/9/2017    Performed by Patricia Mg Positive dp/pt pulses        ED Course     Labs Reviewed   COMP METABOLIC PANEL (14) - Abnormal; Notable for the following components:       Result Value    Glucose 126 (*)     Sodium 126 (*)     Potassium 2.8 (*)     Chloride 88 (*)     CO2 33.0 (*)     C TECHNIQUE:  AP chest radiograph was obtained.          COMPARISON:  [facility] , XR, XR CHEST PA + LAT CHEST (CPT=71046), 4/21/2020,     8:37 AM.         INDICATIONS:  Hip fracture, history of COPD         PATIENT STATED HISTORY: (As transcribed by Tatyana Cortez Siva De La Cruz MD on 5/28/2020 at 6:41 AM               XR HIP W OR WO PELVIS 1 VIEW, LEFT (CPT=73501)   Final Result    PROCEDURE:  XR HIP W OR WO PELVIS 1 VIEW, LEFT (CPT=73501)         TECHNIQUE:  Unilateral view of the hip. COMPARISON:  None. List                       Present on Admission  Date Reviewed: 2/4/2020          ICD-10-CM Noted POA    * (Principal) Closed fracture of left hip, initial encounter [facility]) S72.002A 5/28/2020     COPD exacerbation ([facility] [address] J44.1 9/5/2019     Fall at home, init

## 2020-05-28 NOTE — CM/SW NOTE
Pt admitted for trip and fall resulting in a hip fracture. Pt had surgery today. Pt lives with her . PT/OT to see.

## 2020-05-28 NOTE — PLAN OF CARE
NURSING ADMISSION NOTE      Patient admitted via Cart  Oriented to room. Safety precautions initiated. Bed in low position. Call light in reach.     Received pt around 0500  A/OX4, [place], 2-3LO2, VSS, SR per Tele  At rest pt states no pain to L hip  NP

## 2020-05-28 NOTE — ANESTHESIA PROCEDURE NOTES
[facility]  Performed by: Vika Zabala MD  Authorized by: Vika Zabala MD       General Information and Staff    Start Time:  5/28/2020 2:36 AM  End Time:  5/28/2020 2:42 AM  Anesthesiologist:  Vika Zabala MD  Performed by:   Anesthesiologist  Patient

## 2020-05-28 NOTE — ED INITIAL ASSESSMENT (HPI)
[facility] [place] [facility]. Denies any head/neck pain or injury.  EMS gave 40 mcg fentanyl and 4mg zofran en route

## 2020-05-28 NOTE — ED NOTES
Patient signed consent form for left femoral black with ultrasound guidance. Anesthesiologist, Dr. Med Harrison, at [facility].

## 2020-05-28 NOTE — CONSULTS
[facility] Consult Note    CC: left hip injury    HPI: This 81year old female presents as ED admission following mechanical fall at home. Patient lives with her . Reports she was getting up when she tripped and fell.  Reports pain in the left hip onl [facility] MAIN OR   • LUMBAR FACET INJECTION Left 6/15/2015    Performed by Ligia Bragg MD at [facility] MAIN OR   • OOPHORECTOMY     • OTHER SURGICAL HISTORY      Drain for abdominal abscess   • RADIOFREQUENCY ABLATION OF SACROILIAC JOINT Right 2/9/2017    Perform pelvis personally reviewed/radiology report read - demonstrates acute closed displaced traumatic left femoral neck fracture      Labs: Hgb 12.8  Albumin 3.1  MRSA neg  Cr 0.68      Assessment/Plan:  81year old female with acute closed traumatic displaced Surgery

## 2020-05-28 NOTE — CONSULTS
[facility]  Report of Consultation    Thelmaedd Howekathy Patient Status:  Inpatient    1935 MRN [id number]   [facility] 7NE-A Attending Hema Syed MD   Hosp Day # 0 PCP Tayler Linares MD     Reason for Consultation: Khang Romero • BRONCHOSCOPY N/A 4/30/2019    Performed by Ashley Chaudhry MD at [address] [place] [address]   • COLECTOMY     • ENDOSCOPIC RETROGRADE CHOLANGIOPANCREATOGRAPHY (ERCP) N/A 11/14/2014    Performed by Dorlene Gilford, MD at [facility] ENDOSCOPY   • E AS NEEDED FOR ANXIETY, Disp: 100 tablet, Rfl: 0, Past Week at Unknown time  budesonide 0.5 MG/2ML Inhalation Suspension, Take 2 mL (0.5 mg total) by nebulization 2 (two) times daily. , Disp: 180 ampule, Rfl: 3, 5/27/2020 at Unknown time  ipratropium-albuter day for 3 days, then 2 pills a day for 3 days, then 1 pill a day (Patient not taking: Reported on 5/28/2020 ), Disp: 40 tablet, Rfl: 0, Unknown at Unknown time  hydrochlorothiazide 25 MG Oral Tab, Take 1 tablet (25 mg total) by mouth daily.  (Patient taking air entry without wheezes or crackles   Chest wall: No tenderness or deformity. Heart: Regular rate and rhythm, normal sinus rhythm, normal S1S2, no murmur. Abdomen: soft, non-tender, non-distended, no masses, no guarding, no     rebound, positive BS.

## 2020-05-28 NOTE — ANESTHESIA PREPROCEDURE EVALUATION
PRE-OP EVALUATION    Patient Name: Thelma Martinez    Pre-op Diagnosis: *left femoral neck fracture    Left femoral/PENG nerve block with ultrasound    Pre-op vitals reviewed.   Temp: 98.1 °F (36.7 °C)  Pulse: 92  Resp: 18  BP: 155/97  SpO2: 96 %  Body mass in by mouth daily. , Disp: , Rfl:   Cyanocobalamin (VITAMIN B 12 OR), Take 1 tablet by mouth daily. , Disp: , Rfl:   simvastatin (ZOCOR) 20 MG Oral Tab, Take 20 mg by mouth daily. , Disp: , Rfl: 5        Allergies: Sulfa Antibiotics;  Latex      Anesthesia OR LUMBAR MEDIAL BRANCH Right 2/16/2017    Performed by Mini Bush MD at [facility] MAIN OR   • REMOVAL GALLBLADDER     • SACROILIAC JOINT INJECTION BILATERAL Bilateral 6/29/2015    Performed by Mini Bush MD at [address] [place] [address]

## 2020-05-28 NOTE — ANESTHESIA POSTPROCEDURE EVALUATION
[facility]    Terry Cox North Patient Status:  Emergency   Age/Gender 81year old female MRN [id number]   Location [address] Attending Silviano Michele MD   [address] Day # 0 PCP Ike Juan MD       Anesthesia Post-op Note    *le

## 2020-05-29 NOTE — H&P
[facility]  History & Physical    Teto Stokes Patient Status:  Inpatient    1935 MRN [id number]   [facility] 7NE-A Attending Xiomara Monroe MD   Hosp Day # 0 PCP Ramu Morrison MD     History of Present Illness:  Teto Stokes MD at [facility] MAIN OR   • OOPHORECTOMY     • OTHER SURGICAL HISTORY      Drain for abdominal abscess   • RADIOFREQUENCY ABLATION OF SACROILIAC JOINT Right 2/9/2017    Performed by Moody Arauz MD at [facility] MAIN OR   • Karthik Fall daily., Disp: 60 tablet, Rfl: 11, 5/27/2020 at Unknown time  sodium chloride 3 % Inhalation Nebu Soln, Take 3 mL by nebulization 3 (three) times daily. , Disp: 180 vial, Rfl: 3, 5/27/2020 at Unknown time  FLUoxetine HCl 40 MG Oral Cap, Take 1 capsule (40 mg Unknown time  guaiFENesin-codeine (VIRTUSSIN A/C) 100-10 MG/5ML Oral Solution, Take 5 mL by mouth every 4 (four) hours as needed for cough. , Disp: , Rfl: , Unknown at Unknown time  Pantoprazole Sodium 40 MG Oral Tab EC, Take 40 mg by mouth every morning be Pulses:   2+ and symmetric all extremities   Skin:   Skin color, texture, turgor normal, no rashes or lesions   Lymph nodes:   Cervical, supraclavicular, and axillary nodes normal   Neurologic:   CNII-XII intact, normal strength, sensation and reflexes [facility])     Hip fracture, right, closed, initial encounter ([facility] [address]          Plan:  1  Left hip fracture  Ortho consult   Dilaudid for pain control and femoral block  Will medically tune patient up today for OR tomorrow     2  Hyponatremia  ivf   Hold hctz    3

## 2020-05-29 NOTE — PLAN OF CARE
A&O x 4 forgetful at time. Hard of hearing  Lungs crackly, inspiratory wheezes. SOB  HR NSR on tele  Bladder scanned and straight cathed  Med rec done with . Meds given per orders  Pt did not have much of an appetite.  Dilaudid given x3 for pain in

## 2020-05-29 NOTE — ANESTHESIA PROCEDURE NOTES
Spinal Block  Performed by: Ochoa Mahoney MD  Authorized by: Ochoa Mahoney MD       General Information and Staff    Start Time:  5/29/2020 2:43 PM  End Time:  5/29/2020 2:57 PM  Anesthesiologist:  David Latham MD  Performed by:   Anesthesiologist  S

## 2020-05-29 NOTE — PROGRESS NOTES
[facility]  Progress Note    Heriberto Romo Patient Status:  Inpatient    1935 MRN [id number]   [facility] 7NE-A Attending Herson Seay MD   Hosp Day # 1 PCP Evaristo Chopra MD     Subjective:  Heriberto Romo is a(n) 81year old 05/28/20  0327 05/28/20  1447   * 132*   BUN 8 8   CREATSERUM 0.68 0.82   GFRAA 94 77   GFRNAA 81 66   CA 8.7 8.7   * 128*   K 2.8* 3.8   CL 88* 94*   CO2 33.0* 27.0     Recent Labs   Lab 05/28/20  1025   ABGPHT 7.36   CJJDTU3W 54*   YIZUN4B 1

## 2020-05-29 NOTE — RESPIRATORY THERAPY NOTE
LARISSA : EQUIPMENT USE: DAILY SUMMARY                                            SET MODE: AUTO CPAP WITH CFLEX                                          USAGE IN HOURS: 8:0                                          90%

## 2020-05-29 NOTE — PLAN OF CARE
Pt alert and oriented x3-4, very anxious, confused at times. Consent for hip arthroplasty obtain from  via phone. Pt NPO since midnight. Pt taken to OR @ 1430. Pt denying any pain. Tramadol held d/t pt not having pain and confusion.  Low urine OP fro

## 2020-05-29 NOTE — ANESTHESIA PROCEDURE NOTES
Regional Block  Performed by: Jina Mahoney MD  Authorized by: Jina Mahoney MD       General Information and Staff    Start Time:  5/29/2020 2:58 PM  End Time:  5/29/2020 3:06 PM  Anesthesiologist:  Estela Espinal MD  Performed by:   Anesthesiologist

## 2020-05-29 NOTE — OPERATIVE REPORT
Operative Note    Patient Name/: Kaiden Mccrary 1935  Date: 2020  Location: [facility]  Preoperative Diagnosis: Acute closed traumatic displaced left femoral neck fracture  Postoperative Diagnosis: Same  Operation: Posterior cemented left hi Patient presents to L&D today for fetal reassurance. IUP at 34w5d     NST is reactive. Quality of tracing is satisfactory. placed. There was extensive scar tissue noted about the bursa, as well as heterotopic ossification throughout the abductors/gluteus medius tendon.   The limb was then internally rotated, the gluteus medius was identified, and a blunt cobra retractor was pl degrees adduction and short of 50 degrees internal rotation, as well as 90 degrees hip flexion with 30 to 40 degrees internal rotation. I therefore elected to go up to the +7 head.   The hip was stable in full extension with 90 degrees external rotation of stem size 16 standard offset, 44 mm +7 monopolar head  Drains: None  Condition: Good  Disposition: Floor    -Weightbearing as tolerated, posterior hip precautions, PT OT  -Perioperative pain meds per APS  -Perioperative Ancef  -DVT prophylaxis with mechani

## 2020-05-29 NOTE — PLAN OF CARE
Assumed care. Patient receiving medication for pain of Left hip fx. Tramadol and tylenol scheduled. PRN dilaudid for breakthrough pain. IVF infusing. Patient unable to void overnight. Nunes placed (very difficult).  Urine jose juan and only with 100ml out x 12 evaluate response  - Implement non-pharmacological measures as appropriate and evaluate response  - Consider cultural and social influences on pain and pain management  - Manage/alleviate anxiety  - Utilize distraction and/or relaxation techniques  - Monit

## 2020-05-29 NOTE — ANESTHESIA POSTPROCEDURE EVALUATION
[address] [facility] Patient Status:  Inpatient   Age/Gender 81year old female MRN [id number]   [facility] SURGERY Attending Garry Cain, [address] [facility] [address] Day # 1 PCP Prudence Olson MD       Anesthesia Post-op Note    Procedure(s):

## 2020-05-30 NOTE — PROGRESS NOTES
[facility]  Progress Note    Celina Armstrong Patient Status:  Inpatient    1935 MRN [id number]   [facility] 3NW-A Attending Annel Jha MD   Hosp Day # 2 PCP Chloe Sotelo MD     Subjective:  Celina Armstrong is a(n) 81year old Recent Labs   Lab 05/28/20  0327   INR 1.01   PTT 27.5       Recent Labs   Lab 05/29/20  0756   ABGPHT 7.35   GFRSOH0L 42   QTTHA2L 89   ABGHCO3 22.6   ABGBE -2.9*   TEMP 98.3   REN Positive   SITE Right Radial   DEV Nasal cannula   THGB 12.9       I Days  EPINEPHrine HCl (ADRENALIN) 1 MG/ML injection (Allergic Reaction Kit) 1 mg, 1 mg, Injection, PRN  0.9% NaCl infusion, , Intravenous, Continuous  hydrocortisone Na succinate PF (SOLU-cortef) injection 100 mg, 100 mg, Intravenous, Q8H AMANUEL  ipratropium- steroids. Had been off steroids prior to admission  · Anxiety mgmt   · Monitor urine output. Medicine service to manage constipation  · Prophylaxis- protonix, SCDs.   Initiate systemic DVT prophylaxis when ok per ortho    Discussed w CUONG Singh

## 2020-05-30 NOTE — PHYSICAL THERAPY NOTE
PHYSICAL THERAPY EVALUATION - INPATIENT     Room Number: 324/324-A  Evaluation Date: 5/30/2020  Type of Evaluation: Initial  Physician Order: PT Eval and Treat    Presenting Problem: s/p left hip hemiarthroplasty 5/29/20  Reason for Therapy: Mobility Performed by Riccardo Martins MD at [facility] ENDOSCOPY   • [place]     • HERNIA SURGERY  05/18/16   • HERNIA VENTRAL REPAIR N/A 5/18/2016    Performed by Waylon Loo MD at [address] [place] [address] - [facility] N/A 9/17/2014    Per difficulty dividing attention  · Orientation Level:  oriented x4  · Memory:  decreased recall of biographical information and decreased recall of precautions  · Following Commands:  follows one step commands consistently    RANGE OF MOTION AND STRENGTH ASS Provided: PPE worn by therapist throughout session, surgical mask and gloves. Pt recd in supine, educated in role of PT, goals for session, ankle pumps for DVT prevention, posterior hip precautions, wbat left LE.   Pt able to perform ankle pumps, demonstra returning to prior to level of function. The AM-PAC '6-Clicks' Inpatient Basic Mobility Short Form was completed and this patient is demonstrating a 73% degree of impairment in mobility.  Research supports that patients with this level of impairment may b

## 2020-05-30 NOTE — PLAN OF CARE
Plan of care explained and updated with patient input. Progressing per plan of care. Patient is alert and oriented to person and place (being hospital, unable to name which hospital). Patient is disoriented to time and situation.  On oxygen 3 liters per al

## 2020-05-30 NOTE — PROGRESS NOTES
[facility] Orth[facility] Progress Note    Subjective: No acute events. Patient reports pain in hip which is controlled with meds. Got up and took a few steps with therapy, up to chair currently.     Objective: Pleasant, conversational, sitting up in chair putting on makeu

## 2020-05-30 NOTE — PROGRESS NOTES
[facility]  Progress Note    Pal Rough Patient Status:  Inpatient    1935 MRN [id number]   [facility] 3NW-A Attending Joy Armstrong MD   Hosp Day # 2 PCP Maykel Ruiz MD       SUBJECTIVE:  No CP, SOB, or N/V.   Comfortabl injection 5 mg, 5 mg, Intravenous, Q6H PRN  Prochlorperazine Edisylate (COMPAZINE) injection 10 mg, 10 mg, Intravenous, Q6H PRN  aspirin tab 325 mg, 325 mg, Oral, BID  ceFAZolin sodium (ANCEF/KEFZOL) 2 GM/20ML premix IV syringe 2 g, 2 g, Intravenous, Q8H mg, Oral, Daily  FLUoxetine HCl (PROZAC) cap 40 mg, 40 mg, Oral, Daily  furosemide (LASIX) tab 20 mg, 20 mg, Oral, Daily  guaiFENesin ER (MUCINEX) 12 hr tab 1,200 mg, 1,200 mg, Oral, BID  guaiFENesin-codeine (ROBITUSSIN AC) 100-10 MG/5ML syrup 5 mL, 5 mL, initial encounter [facility])        Plan:   1  Left hip fracture  Ortho consult   Dilaudid for pain control and femoral block  Medically cleared    patient  for OR today      2  Hyponatremia  ivf   Hold hctz     3  Hypokalemia  k rider 40  lyte protocol\     4

## 2020-05-30 NOTE — OCCUPATIONAL THERAPY NOTE
OCCUPATIONAL THERAPY EVALUATION - INPATIENT     Room Number: 324/324-A  Evaluation Date: 5/30/2020  Type of Evaluation: Initial  Presenting Problem: Fall resulting in left hip fx s/p L posterior cemented hip hemiarthoplasty    Physician Order: IP Consult t at [address] [address]   • COLECTOMY     • ENDOSCOPIC RETROGRADE CHOLANGIOPANCREATOGRAPHY (ERCP) N/A 11/14/2014    Performed by Noelle Amezquita MD at [facility]COPY   [facility]     • HERNIA SURGERY  05/18/16   • HERNIA VENTRAL Extremity: Weight Bearing as Tolerated    PAIN ASSESSMENT  Rating: Unable to rate  Location: left hip  Management Techniques: Repositioning(IV pain meds)    COGNITION  Attention Span:  attends with cues to redirect  Orientation Level:  oriented to place, o Moderate assistance  Sit to Stand: Moderate assistance    Skilled Therapy Provided: Patient educated on OT role, goals, recommendations, safety and precautions and ae/dme. Educated patient on posterior hip precautions.   Patient required frequent cues to functional level and would benefit from skilled inpatient OT to address the above deficits, maximizing patient’s ability to return safely to her prior level of function. Subacute rehab is recommended for 12-14 days.   After this period of rehabilitation

## 2020-05-30 NOTE — CM/SW NOTE
05/30/20 1500   CM/SW Referral Data   Referral Source Family   Reason for Referral Discharge planning;Psychoscial assessment   Informant Spouse   Patient Info   Patient's Mental Status Alert;Oriented   Patient's [address]   Patient lives wi

## 2020-05-30 NOTE — PROGRESS NOTES
Post Op Day 1 Ortho Note: Posterior cemented left hip hemiarthroplasty    Status Post Nerve Block:  Type of Nerve Block: Left Fascia Iliaca  Single Injection Nerve Block    Post op review: No evidence of immediate block related complications, No paresthesi

## 2020-05-30 NOTE — PROGRESS NOTES
[facility] Note:  Renal Dose Adjustment for Metoclopramide (REGLAN)    Jeniffer Scanlon has been prescribed Metoclopramide (REGLAN) 10 mg every 6 hours as needed for n/v.    Estimated Creatinine Clearance: 33.1 mL/min (A) (based on SCr of 1.16 mg/dL (H)).

## 2020-05-30 NOTE — RESPIRATORY THERAPY NOTE
LARISSA - Equipment Use Daily Summary:  · Set Mode CFLEX  · Usage in hours: 8.00  · 90% Pressure (EPAP) level:10.5   · 90% Insp Pressure (IPAP):   · AHI: 12.6  · Supplemental Oxygen:   · Comments:

## 2020-05-31 PROCEDURE — 99222 1ST HOSP IP/OBS MODERATE 55: CPT | Performed by: INTERNAL MEDICINE

## 2020-05-31 NOTE — PROGRESS NOTES
[facility]  Progress Note    Akua Kristal Patient Status:  Inpatient    1935 MRN [id number]   [facility] 3NW-A Attending Elizabeth Stewart MD   Hosp Day # 3 PCP Soraida Prabhakar MD       SUBJECTIVE:  No CP, SOB, or N/V.   Confused 0.5 mg, Oral, Q6H PRN    Or  LORazepam (ATIVAN) injection 0.5 mg, 0.5 mg, Intravenous, Q6H PRN  oxyCODONE HCl (OXY-IR) cap/tab 2.5 mg, 2.5 mg, Oral, Q4H PRN    Or  oxyCODONE HCl (OXY-IR) cap/tab 5 mg, 5 mg, Oral, Q4H PRN  acetaminophen (TYLENOL) tab 650 mg HCl (PROZAC) cap 40 mg, 40 mg, Oral, Daily  furosemide (LASIX) tab 20 mg, 20 mg, Oral, Daily  guaiFENesin ER (MUCINEX) 12 hr tab 1,200 mg, 1,200 mg, Oral, BID  guaiFENesin-codeine (ROBITUSSIN AC) 100-10 MG/5ML syrup 5 mL, 5 mL, Oral, Q4H PRN  ipratropium-a protocol\     4  Copd and bronchial malacia  Nebs   cpap  pulm consult  cxr     5  Anxiety   Ativan  prozac  trazadone     6  htn  Resume meds      7  dyslipid  Continue statin      8  Pain management  Will be difficult with pulm suppresion and desats  Hop Yes...

## 2020-05-31 NOTE — PLAN OF CARE
Received patient post RRT around [phone]. Patient alert to self and hospital through confused and forgetful at times. Received patient on Bipap. FiO2 weaned per RT.  Patient placed on NC trial, tolerated for an hour and before dropping O2 sats and having some scale  - Administer analgesics based on type and severity of pain and evaluate response  - Implement non-pharmacological measures as appropriate and evaluate response  - Consider cultural and social influences on pain and pain management  - Manage/alleviat transferring and ambulating w/ or w/o assistive devices  - Assist with transfers and ambulation using safe patient handling equipment as needed  - Ensure adequate protection for wounds/incisions during mobilization  - Obtain PT/OT consults as needed  - Adv

## 2020-05-31 NOTE — RESPIRATORY THERAPY NOTE
CALLED TO PATIENT ROOM FOR RAPID RESPONSE. PATIENT APPEARS TO BE IN DISTRESS WITH RR 40 SAO2 80'S ON CPAP. HOSPITALIST  AND DR FOLEY AT BEDSIDE. PLACCED ON BIPAP 16/8 40%. BASHIR FAIRLY WELL. TRANSPORT PATIENT TO CAT SCAN. WITHOUT INCIDENT.   ANESTH

## 2020-05-31 NOTE — PLAN OF CARE
Sitting up in chair. Hip precautions maintained. Hip dressing clean, dry, intact. 02 1-2 liters nc when sleeping.  02 sat 96% r/a at this time. Nunes draining clear yellow urine. Anxious at times, prn medication given with relief.   Instructed on plan

## 2020-05-31 NOTE — ANESTHESIA PROCEDURE NOTES
Peripheral IV  Date/Time: 5/31/2020 8:30 AM  Inserted by: Romulo Abdul MD    Placement  Needle size: 20 G  Laterality: left  Location: antecubital  Local anesthetic: none  Site prep: alcohol  Technique: anatomical landmarks  Attempts: 1

## 2020-05-31 NOTE — PLAN OF CARE
Plan of care explained and updated with patient input. Progressing per plan of care. Patient is alert and oriented to person and place, disoriented to time and situation. Very forgetful, with reminders sometimes helpful.  On oxygen 2liters per nasal cannula

## 2020-05-31 NOTE — PROGRESS NOTES
[facility]  RAPID RESPONSE NOTE     Chepe Part Patient Status:  Inpatient    1935 MRN [id number]   [facility] 6NE-A Attending Eugenio Garner MD   Hosp Day # 3 PCP Shawnee Cerna MD     Reason for RRT:hypoxia and tachycard

## 2020-05-31 NOTE — RESPIRATORY THERAPY NOTE
LARISSA - Equipment Use Daily Summary:  · Set Mode CFLEX  · Usage in hours: 8:00  · 90% Pressure (EPAP) level: 9.0  · 90% Insp Pressure (IPAP):   · AHI: 34.5  · Supplemental Oxygen:   · Comments:

## 2020-05-31 NOTE — PLAN OF CARE
Left two messages for patient's , Mai Flynn, to return call regarding patient. Contacted patient's daughter, Emani Nesbitt, and informed her of patient's respiratory status, need for further testing, and transfer to the unit in room 6610.  Patient's daughter, Emani Nesbitt

## 2020-05-31 NOTE — PLAN OF CARE
At 0720, patient found to not have her oxygen on and 02 sat 59-64. Very short of breath, lung sounds congested. 02 immediately applied, with cpap rapid response called. Orders rec'kanika carried out.   Transferred to ct scan, accompanied by critical care r

## 2020-05-31 NOTE — PROGRESS NOTES
[facility]  Progress Note    Zackary Merlin Patient Status:  Inpatient    1935 MRN [id number]   [facility] 3NW-A Attending Princess Darron MD   Hosp Day # 3 PCP Dexter Leal MD     Subjective:  Zackary Merlin is a(n) 81year old non-tender, non-distended, no masses, no guarding, no   rebound, positive BS   Extremity: 2+ R>L LE edema, no cyanosis   Neurological: Alert, interactive, no focal deficits, mild confusion    Lab Data Review:  Recent Labs   Lab 05/29/20  0605 05/30/20  070 stat.  Heparin gtt started. Will stop if no PE.  · IVF stopped. Lasix diuresis.   Monitor response  · Follow up TTE results  · Trend lactate  · Continue bipap, wean as tolerated back to NC and baseline nasal CPAP as pt improves  · Continue nebs, inhaler t

## 2020-06-01 PROCEDURE — 99232 SBSQ HOSP IP/OBS MODERATE 35: CPT | Performed by: INTERNAL MEDICINE

## 2020-06-01 NOTE — PROGRESS NOTES
[facility]  Cardiology Critical Care Progress Note    Becky Siddiqui Patient Status:  Inpatient    1935 MRN [id number]   [facility] 6NE-A Attending Jerry Powers MD   Hosp Day # 4 PCP Audrey Donovan MD       Subjective:  Had so 06/01/2020    DDIMER 0.94 06/01/2020    MG 2.1 06/01/2020    PHOS 2.8 06/01/2020       CXR:Shallow inspiratory film.   Development of left lower lobe retrocardiac linear opacity most consistent with atelectasis, early consolidative process cannot be exclude outlined by dr Elisa Buerger of care with patient and nursing. FRANCISCA Vasquez  6/1/2020  2:36 PM      =======================================================  Patient seen and examined independently.   Note reviewed and labs revi

## 2020-06-01 NOTE — PROGRESS NOTES
[facility]  Progress Note    Jeevan Logan Patient Status:  Inpatient    1935 MRN [id number]   [facility] 6NE-A Attending Raleigh Hughes MD   [facility] Day # 4 PCP Vimal Chris MD     Subjective:  Jeevan Logan is a(n) 81year old MCH 28.8 29.2 29.0 29.3   MCHC 33.0 32.9 33.6 33.2   RDW 13.3 13.3 13.7 13.6   NEPRELIM 10.07* 9.95*  --  6.84   WBC 11.4* 11.6* 10.8 9.7   .0 153.0 195.0 201.0     Recent Labs   Lab 05/30/20  0708 05/31/20  0639 06/01/20  0356   * 116*  11 atrium: The left atrial volume was mildly increased. 5. Right ventricle: The cavity size was moderately increased. Systolic function was mildly reduced.  Colunga sign present (could be to any cause of acute rise in pulmonary artery systolic pressure, ie

## 2020-06-01 NOTE — OCCUPATIONAL THERAPY NOTE
OCCUPATIONAL THERAPY TREATMENT NOTE - INPATIENT     Room Number: 9412/1413-C  Session: 1   Number of Visits to Meet Established Goals: 7    Presenting Problem: fall resulting in left hip fx s/p L THR 5/29/20; transferred to CCU 5/31/20 d/t PE    History re ENDOSCOPY   • BRONCHOSCOPY N/A 4/30/2019    Performed by Guillermina Mike MD at [address] [place] [address]     • ENDOSCOPIC RETROGRADE CHOLANGIOPANCREATOGRAPHY (ERCP) N/A 11/14/2014    Performed by Truman Ochoa MD at [address] [place] [address] person does the patient currently need…  -   Putting on and taking off regular lower body clothing?: Total  -   Bathing (including washing, rinsing, drying)?: A Lot  -   Toileting, which includes using toilet, bedpan or urinal? : Total  -   Putting on and strengthening/ROM; Endurance training;Cognitive reorientation;Patient/Family education;Patient/Family training;Equipment eval/education; Compensatory technique education  Rehab Potential : Good  Frequency (Obs): 3-5x/week    Ongoing as of 6/1/20  ADL GOALS:

## 2020-06-01 NOTE — CM/SW NOTE
MSW spoke with the patient's spouse Chelsie Mccoy by phone to discuss choice of [facility]. He requested  [facility]. MSW informed him that currently  does not have a [facility] unit.   He informed MSW that he will have to discuss alternative facilities with his daughter and will

## 2020-06-01 NOTE — CONSULTS
[facility]  Cardiology Critical Care Consultation Note    Andrew Soriano Patient Status:  Inpatient    1935 MRN [id number]   [facility] 6NE-A Attending Jr Duke MD   Hosp Day # 3 PCP Jasmina Quinn MD     Reason f N/A 11/14/2014    Performed by Etienne Sampson MD at [facility] ENDOSCOPY   • [facility]     • HERNIA SURGERY  05/18/16   • HERNIA VENTRAL REPAIR N/A 5/18/2016    Performed by Guillermina Irby MD at [address] [place] [address] - [facility] N/ (DILAUDID) 1 MG/ML injection 0.4 mg, 0.4 mg, Intravenous, Q2H PRN  •  oxyCODONE HCl (OXY-IR) cap/tab 2.5 mg, 2.5 mg, Oral, Q4H PRN **OR** [DISCONTINUED] oxyCODONE HCl (OXY-IR) cap/tab 5 mg, 5 mg, Oral, Q4H PRN **OR** [DISCONTINUED] OxyCODONE HCl IR (ROXICO guaiFENesin ER (MUCINEX) 12 hr tab 1,200 mg, 1,200 mg, Oral, BID  •  guaiFENesin-codeine (ROBITUSSIN AC) 100-10 MG/5ML syrup 5 mL, 5 mL, Oral, Q4H PRN  •  ipratropium-albuterol (DUONEB) nebulizer solution 3 mL, 3 mL, Nebulization, Q4H PRN  •  Montelukast S CO2 25.0 05/31/2020     05/31/2020     05/31/2020    CA 8.3 05/31/2020    CA 8.3 05/31/2020    ALB 2.7 05/31/2020    ALKPHO 51 05/31/2020    BILT 0.4 05/31/2020    TP 6.2 05/31/2020    AST 30 05/31/2020    ALT 13 05/31/2020    .1 05 of acute rise in pulmonary artery systolic pressure, ie COPD exacerbation, pulmonary embolism, etc; recommend clinical correlation). TAPSE: 1.5cm . 6. Right atrium: The atrium was moderately dilated. 7. Tricuspid valve:  There was mild-moderate regurgitat

## 2020-06-01 NOTE — PHYSICAL THERAPY NOTE
PHYSICAL THERAPY TREATMENT NOTE - INPATIENT    Room Number: 9204/7499-F     Session: 1   Number of Visits to Meet Established Goals: 5    Presenting Problem: s/p left hip hemiarthroplasty 5/29/20     Pt admitted 5/28/20 due to fall resulting in left hip f ENDOSCOPIC RETROGRADE CHOLANGIOPANCREATOGRAPHY (ERCP) N/A 11/14/2014    Performed by Gaviota Solis MD at [facility] ENDOSCOPY   • [place]     • HERNIA SURGERY  05/18/16   • HERNIA VENTRAL REPAIR N/A 5/18/2016    Performed by Emma Pablo MD at sheets and blankets)?: A Lot   -   Sitting down on and standing up from a chair with arms (e.g., wheelchair, bedside commode, etc.): A Lot   -   Moving from lying on back to sitting on the side of the bed?: A Lot   How much help from another person does th session/findings; All patient questions and concerns addressed    ASSESSMENT   The pt continues to exhibit impaired static and dynamic sitting and standing balance, impaired aerobic capacity, impaired LLE strength and pain.   These impairments functionally l

## 2020-06-01 NOTE — PLAN OF CARE
Pt. Follow commands, dyspnea with excursion, vitals stable, pain is controlled, family updated on plan of care, all personal belongings with patient.

## 2020-06-01 NOTE — PLAN OF CARE
Assumed care of pt at 0730 she is resting in bed. High flow N/C at 8L SaO2 > 95%. Will wean as tolerated. She is anxious at times, reassurance given multiple times and she does relax.   Pt c/o nausea- encouraged to take bites of toast or jessica crackers aft non-pharmacological measures as appropriate and evaluate response  - Consider cultural and social influences on pain and pain management  - Manage/alleviate anxiety  - Utilize distraction and/or relaxation techniques  - Monitor for opioid side effects  - N patient handling equipment as needed  - Ensure adequate protection for wounds/incisions during mobilization  - Obtain PT/OT consults as needed  - Advance activity as appropriate  - Communicate ordered activity level and limitations with patient/family  Out

## 2020-06-01 NOTE — PLAN OF CARE
Assumed patient care this PM. Patient A/Ox4. Patient on BIPAP at 40%. Patient was put  [address] starting at 7L to give medications and drink. O2 sat dropped when coughing and took awhile to recover, O2 was turned up to 15L. Patient tolerated well.  BIPAP was severity of pain and evaluate response  - Implement non-pharmacological measures as appropriate and evaluate response  - Consider cultural and social influences on pain and pain management  - Manage/alleviate anxiety  - Utilize distraction and/or relaxatio

## 2020-06-01 NOTE — PROGRESS NOTES
Received patient on BiPAP settings noted below. Receiving Q4 duo, Q8 muco. No events overnight.        06/01/20 0322   BiPAP   $ RT Standby Charge (per 15 min) 1  (neb tx)   Device V60   Mode Spontaneous/Timed   Interface Full face mask   Mask Size Small

## 2020-06-02 PROCEDURE — 99232 SBSQ HOSP IP/OBS MODERATE 35: CPT | Performed by: NURSE PRACTITIONER

## 2020-06-02 NOTE — PROGRESS NOTES
[facility]  Cardiology Progress Note    Subjective:  No chest pain or shortness of breath. Trying to nap. Feels depressed.     Objective:  BP 97/69   Pulse 93   Temp 98.6 °F (37 °C) (Oral)   Resp (!) 27   Ht 5' 5\" (1.651 m)   Wt 183 lb 3.2 oz (83.1 hypoxic respiratory failure with associated pulm edema - improved. EF ~60-65%  · Small bilateral PE, associated trivial troponin elevation - remains on IVUFH. Low ext venous doppler negative for DVT.   · Severe COPD w/ acute exacerbation  · Hyponatremia

## 2020-06-02 NOTE — CM/SW NOTE
Sw spoke to pt's  today re: JUAN R choice. He states pt's daughter will be coming over to his house tonight to discuss JUAN R options. Message left with daughter to discuss JUAN R options. NH screen requested.     FRM Study Course Madonna, NAHEEDW  /Waed

## 2020-06-02 NOTE — PROGRESS NOTES
Received patient this am alert and oriented  Very anxious throughout shift, ativan PRN per [facility]  Weaned to 8 L hi flow nasal canula  Heparin per MAR  Nunes draining yellow urine  Pain meds per MAR  Up to chair this am with 2 assist and walker  Will monitor.

## 2020-06-02 NOTE — OCCUPATIONAL THERAPY NOTE
OCCUPATIONAL THERAPY TREATMENT NOTE - INPATIENT     Room Number: 9020/2954-T  Session: 2   Number of Visits to Meet Established Goals: 7    Presenting Problem: fall resulting in left hip fx s/p L THR 5/29/20; transferred to CCU 5/31/20 d/t PE    History re ENDOSCOPY   • BRONCHOSCOPY N/A 4/30/2019    Performed by Mitzi Turner MD at [address] [place] [address]     • ENDOSCOPIC RETROGRADE CHOLANGIOPANCREATOGRAPHY (ERCP) N/A 11/14/2014    Performed by Shaye Kapadia MD at [address] [place] [address] (including washing, rinsing, drying)?: A Lot  -   Toileting, which includes using toilet, bedpan or urinal? : A Lot  -   Putting on and taking off regular upper body clothing?: A Lot  -   Taking care of personal grooming such as brushing teeth?: A Little Treatment Plan: Balance activities; Energy conservation/work simplification techniques;ADL training;Functional transfer training;UE strengthening/ROM; Endurance training;Cognitive reorientation;Patient/Family education;Patient/Family training;Equipment eval/

## 2020-06-02 NOTE — PROGRESS NOTES
06/02/20 0330   BiPAP   $ RT Standby Charge (per 15 min) 1   $ BiPAP in use daily Yes   Device V60   BiPAP / CPAP CE# 1   Mode Spontaneous/Timed   Interface Full face mask   Mask Size Small   Control Settings   Set Rate 16 breaths/min   Set IPAP 16   Se

## 2020-06-02 NOTE — PROGRESS NOTES
[facility]  Progress Note    Roxana Batres Patient Status:  Inpatient    1935 MRN [id number]   [facility] 8NE-A Attending Lurdes Tucker MD   Hosp Day # 4 PCP Jabier Garcia MD       SUBJECTIVE:  No CP, SOB, or N/V.   Stable in Intravenous, Q12H  heparin (PORCINE) drip 18122golpx/250mL infusion CONTINUOUS, 200-3,000 Units/hr, Intravenous, Continuous  HYDROmorphone HCl (DILAUDID) 1 MG/ML injection 0.1 mg, 0.1 mg, Intravenous, Q2H PRN  oxyCODONE HCl (OXY-IR) cap/tab 2.5 mg, 2.5 mg, Oral, Nightly  atorvastatin (LIPITOR) tab 20 mg, 20 mg, Oral, Nightly  traZODone HCl (DESYREL) tab 50 mg, 50 mg, Oral, Nightly          Assessment  Patient Active Problem List:     HTN (hypertension)     Anxiety     Tobacco abuse, episodic     Pericolonic pap  Continue heparin   Will decide anticoag  Consult hem           Sidjosie Martino  6/1/2020  10:45 PM

## 2020-06-02 NOTE — PROGRESS NOTES
[facility]  Progress Note    Jeevan Logan Patient Status:  Inpatient    1935 MRN [id number]   [facility] 8NE-A Attending Raleigh Hughes MD   [address] Day # 5 PCP Vimal Chris MD     Subjective:  Jeevan Logan is a(n) 81year old focal deficits    Lab Data Review:  Recent Labs   Lab 05/30/20  0708 05/31/20  0620 06/01/20  0356 06/02/20  0502   RBC 3.91 4.07 3.93 3.75*   HGB 11.4* 11.8* 11.5* 10.9*   HCT 34.7* 35.1 34.6* 32.5*   MCV 88.7 86.2 88.0 86.7   MCH 29.2 29.0 29.3 29.1   MC retention. Edie Maser as needed  · Anxiety mgmt  · Prophylaxis- heparin gtt. Discussion: I suspect the patient is significantly depressed and also has some early dementia changes. Will ask psychiatry to see her and advise. Anastasiya Donaldson SR.  Crit

## 2020-06-02 NOTE — CONSULTS
[facility]  Report of Psychiatric Consultation    Vernon Centerdouglas Scanlon Patient Status:  Inpatient    1935 MRN [id number]   [facility] 8NE-A Attending Joshua MD [place]   [facility] Day # 5 PCP Aminta Yeung MD     Date of Admission:  and PE's. She had a left hip replacement on 5/29/20. She is on nebs and steroids for her AE-COPD. Diuresed as well. Psych consulted to fidel for anxiety and depression. She has been making comments about wanting \"to pull the plug\" per RN.  Per Dr. Norberto Davalos RETROGRADE CHOLANGIOPANCREATOGRAPHY (ERCP) N/A 11/14/2014    Performed by Annie Tyson MD at [facility] ENDOSCOPY   • [place]     • HERNIA SURGERY  05/18/16   • HERNIA VENTRAL REPAIR N/A 5/18/2016    Performed by Kerry Carrasco MD at [address] [place] [address] Continuous  •  HYDROmorphone HCl (DILAUDID) 1 MG/ML injection 0.1 mg, 0.1 mg, Intravenous, Q2H PRN **OR** [DISCONTINUED] HYDROmorphone HCl (DILAUDID) 1 MG/ML injection 0.2 mg, 0.2 mg, Intravenous, Q2H PRN **OR** [DISCONTINUED] HYDROmorphone HCl (DILAUDID) HCl (PROZAC) cap 40 mg, 40 mg, Oral, Daily  •  guaiFENesin ER (MUCINEX) 12 hr tab 1,200 mg, 1,200 mg, Oral, BID  •  guaiFENesin-codeine (ROBITUSSIN AC) 100-10 MG/5ML syrup 5 mL, 5 mL, Oral, Q4H PRN  •  ipratropium-albuterol (DUONEB) nebulizer solution 3 mL

## 2020-06-02 NOTE — PROGRESS NOTES
PSYCH CONSULT    Date of Admission: 5/28/20  Date of Consult: 6/2/20  Reason for Consultation: Eval for depression, dementia     ADDENDUM: She went into resp distress and is currently on BIPAP. ABG sent on FIO2 60%. SBP 90's. -130's. RR 30's.  Respond

## 2020-06-03 PROCEDURE — 99233 SBSQ HOSP IP/OBS HIGH 50: CPT | Performed by: INTERNAL MEDICINE

## 2020-06-03 NOTE — RESPIRATORY THERAPY NOTE
LARISSA - Equipment Use Daily Summary:  · Set Mode   · Usage in hours:   · 90% Pressure (EPAP) level:   · 90% Insp Pressure (IPAP):   · AHI:   · Supplemental Oxygen:   · Comments: DID NOT  WEAR.  ON V60 NOW

## 2020-06-03 NOTE — PLAN OF CARE
Received pt at 1930. Pt drowsy and oriented to person, place and situation, not to time. Pt able to follow commands and moves all extremities. Pt on BiPAP, saturating well, lungs sound diminished.  Pt in ST, -120s, SBP maintained above 90 with levophe

## 2020-06-03 NOTE — PROGRESS NOTES
[facility]  Progress Note    Paras Bernal Patient Status:  Inpatient    1935 MRN [id number]   [facility] 8NE-A Attending Anel Hawk MD   Hosp Day # 6 PCP Hari Nichole MD     STATUS UPDATE: At approximately [id number] last nigh atraumatic. Eyes: Conjunctivae/corneas clear. No scleral icterus. No conjunctival     hemorrhage. Nose: Nares normal.   Throat: Lips, mucosa, and tongue normal.  No thrush noted.    Neck: Soft, supple neck; trachea midline, no adenopathy, no thyromega exacerbation and fluid overload-subacute PE present on CTA for which anticoagulation will continue. Now with further deterioration and evidence of new pulmonary emboli and evolving shock.   · severe COPD with acute exacerbation due to above  · Mechanical f

## 2020-06-03 NOTE — PROCEDURES
[facility]  Procedure Note    Diamond Constable Patient Status:  Inpatient    1935 MRN [id number]   [facility] Attending Louann Dela Cruz MD   [facility] Day # 6 PCP Aiden Edwards MD     Procedure: Inferior vena cava

## 2020-06-03 NOTE — PROGRESS NOTES
[facility]  Cardiology Critical Care Note    Jaylon Haji Patient Status:  Inpatient    1935 MRN [id number]   [facility] 6NE-A Attending Court Reid MD   Hosp Day # 6 PCP Darryn Cabral MD       Subjective: [facility] 84.7   < > 86.2 88.0 86.7 88.4  --  87.8  --    .0   < > 195.0 201.0 206.0 337.0  --  331.0  --    BAND  --   --   --   --   --  4  --  4  --    INR 1.01  --   --   --   --   --   --   --   --     < > = values in this interval not displayed.        R Subcutaneous, 4 times per day  Norepinephrine Bitartrate (LEVOPHED) 16 mg in dextrose 5 % 250 mL infusion, 0.5-30 mcg/min, Intravenous, Continuous  Protamine Sulfate injection 8 mg, 8 mg, Intravenous, Once  LORazepam (ATIVAN) tab 0.5 mg, 0.5 mg, Oral, Q6H motion abnormalities. The study is not technically sufficient to allow evaluation of LV diastolic function. 2. Mitral valve: There was mild regurgitation. 3. Right ventricle:  The cavity size was normal. Systolic function was low normal.  4. Right atrium:

## 2020-06-03 NOTE — CM/SW NOTE
Care Progression Note:  Active Acute Medical Issue:   Closed fracture of left hip, initial encounter [facility])   Pulmonary embolus (new)    Other Contributing Medical Factors/Dx. :   Pulmonary embolus (old 5/31/20)  COPD    Length of stay: 6  Avoidable Delays:

## 2020-06-04 PROCEDURE — 99232 SBSQ HOSP IP/OBS MODERATE 35: CPT | Performed by: INTERNAL MEDICINE

## 2020-06-04 NOTE — OCCUPATIONAL THERAPY NOTE
Attempted to see pt for OT. Pt with acute changes to LLE function, orthopedic workup in progress. Will hold therapy at this time, re-attempt to see pt as appropriate and as able.

## 2020-06-04 NOTE — ANESTHESIA PREPROCEDURE EVALUATION
PRE-OP EVALUATION    Patient Name: Jeniffer Scanlon    Pre-op Diagnosis: Hematoma [T14. 8XXA]    Procedure(s):  IRRIGATION AND DEBRIDEMENT LEFT THIGH HEMATOMA    Surgeon(s) and Role:     Rani Dover MD - Primary    Pre-op vitals reviewed.   Temp: 97.1 °F ( 100 UNIT/ML flexpen 2-10 Units, 2-10 Units, Subcutaneous, 4 times per day  [COMPLETED] potassium chloride IVPB premix 20 mEq, 20 mEq, Intravenous, Once  [COMPLETED] 0.9% NaCl infusion, , Intravenous, Once  [COMPLETED] 0.9% NaCl infusion, , Intravenous, Onc mg, 40 mg, Intravenous, Once  [COMPLETED] iohexol (OMNIPAQUE) 350 MG/ML injection 100 mL, 100 mL, Intravenous, ONCE PRN  [COMPLETED] furosemide (LASIX) injection 40 mg, 40 mg, Intravenous, BID (Diuretic)  HYDROmorphone HCl (DILAUDID) 1 MG/ML injection 0.1 Once  [COMPLETED] potassium chloride IVPB premix 20 mEq, 20 mEq, Intravenous, Once  ipratropium-albuterol (DUONEB) nebulizer solution 3 mL, 3 mL, Nebulization, 6 times per day  acetylcysteine (MUCOMYST) 20 % solution 2 mL, 2 mL, Nebulization, Q8H  Pantopra 5  Fluticasone-Umeclidin-Vilant (TRELEGY ELLIPTA) 100-62.5-25 MCG/INH Inhalation Aerosol Powder, Breath Activated, Inhale 1 puff into the lungs daily. , Disp: , Rfl:   dilTIAZem HCl ER Coated Beads (CARTIA XT) 120 MG Oral Capsule SR 24 Hr, Take 120 mg by mo Pericarditis   At risk for falling Spondylosis of lumbar region without myelopathy or radiculopathy   Primary osteoarthritis of both knees COPD (chronic obstructive pulmonary disease) (HCC)   Post-herpetic polyneuropathy Recurrent major depressive disorder Phill Moe MD at [facility] MAIN OR   • LUMBAR FACET INJECTION Left 6/15/2015    Performed by Phill Moe MD at [facility] MAIN OR   • OOPHORECTOMY     • OTHER SURGICAL HISTORY      Drain for abdominal abscess   • RADIOFREQUENCY ABLATION OF SACROILIAC JOINT R including GETA. Currently she is on CPAP.   Plan/risks discussed with: patient and spouse                Present on Admission:  **None**

## 2020-06-04 NOTE — RESPIRATORY THERAPY NOTE
Patient self extubated. Place patient back on Bipap 16/8/40%. ABG was done on Bipap and Patient is doing well.

## 2020-06-04 NOTE — PROGRESS NOTES
[facility]  Cardiology Critical Care Progress Note    Chepe Part Patient Status:  Inpatient    1935 MRN [id number]   [facility] 6NE-A Attending Eugenio Garner MD   Hosp Day # 7 PCP Shawnee Cerna MD       Subjective:  Attila Knight CREATSERUM 0.98 06/04/2020    BUN 41 06/04/2020     06/04/2020    K 3.4 06/04/2020     06/04/2020    CO2 28.0 06/04/2020     06/04/2020    CA 6.1 06/04/2020    ALB 2.0 06/04/2020    ALKPHO 28 06/04/2020    BILT 0.6 06/04/2020    TP 3.9 0 normalize  7. HTN, diast dysfx  8. Hyperlipidemia  9.  Anxiety/depression      Plan:      · Overall, stable from pure cardiac perspective  · Wean pressors as able  · Follow hgb  · Try to optimize pain control  · Await ortho's thoughts  · D/w Dr Dior Recinos

## 2020-06-04 NOTE — PLAN OF CARE
Received pt at 1930. Pt lightly sedated on precedex and oriented to person and place, not to time or situation. Pt following commands in UE and NANO PERRLA. Pt on BiPAP, lungs rhonchorous, saturating well. Pt in NSR, on vasopressin and levophed.  Levophed be

## 2020-06-04 NOTE — PLAN OF CARE
Patient unable to wiggle toes on left foot; left foot very firm taut hip down; patient can feel me touching her left foot, her left foot is warm and pedal pulse is palpable; she does have ecchymosis around incision

## 2020-06-04 NOTE — PLAN OF CARE
DR Ifeoma Meier at bedside. Discussed with family and Dr Luisa Bhatt recommendation to evacuate hematoma. Discussed also with DR Katty Fleming; OK for surgery. DR Cain Lay anesthesiology at bedside and discussed case with daughter over the phone.  Phone consent obtained [facility]

## 2020-06-04 NOTE — PROGRESS NOTES
[facility] Orth[facility] Progress Note    Subjective: Patient with loss of motor and sensation to left foot today. Had documented motor/sensation present to foot at 4am this morning.  Has been on therapeutic heparin drip postop up until IVC filter placed yesterday for acu currently off heparin, and after discussion with Dr. Sofiya Zhang, there are no plans currently to restart chemical anticoagulation for at least the next few days to weeks given that she now has an IVC filter.   Patient and daughter understand this discussion

## 2020-06-04 NOTE — PHYSICAL THERAPY NOTE
Physical Therapy    Attempted to treat pt this p.m., but concern about changes in L le. Orthopedic group has work-up in progress. Will re-assess as schedule allows.

## 2020-06-04 NOTE — ANESTHESIA PROCEDURE NOTES
Arterial Line  Performed by: Rea Mckinney MD  Authorized by: Rea Mckinney MD     General Information and Staff    Procedure Start:  6/4/2020 2:55 PM  Procedure End:  6/4/2020 2:56 PM  Anesthesiologist:  Rea Mckinney MD  Performed By:  Marianela Juarez

## 2020-06-04 NOTE — DIETARY NOTE
[facility]    NUTRITION ASSESSMENT    Pt does not meet malnutrition criteria.     NUTRITION DIAGNOSIS/PROBLEM:    Inadequate oral intake related to physiological causes as evidenced by estimated intake less than estimated needs - NPO status    NUTRITIO maximize  Food Allergies: No  Cultural/Ethnic/Mandaeism Preferences Addresses: Yes    NUTRITION RELATED PHYSICAL FINDINGS:     1. Body Fat/Muscle Mass: well nourished per visual exam.     2. Fluid Accumulation: lower extremity edema per visual exam.    NUT

## 2020-06-04 NOTE — OPERATIVE REPORT
Operative Note    Patient Name/: Prieto Boo 1935  Date: 2020  Location: [facility]  Preoperative Diagnosis: Left hip hematoma, acute sciatic nerve palsy, status post cemented left hip hemiarthroplasty for displaced femoral neck fracture incision. The superficial wound was irrigated with 3 L of saline. Soft tissue was debrided, including skin, subcutaneous fat, and fascial tissue, measuring 14 cm in length by 1 cm in width.   Following this, I did run the fascia with a finger, and noted t APS  -periop ancef  -ca/vit D and protein shake supplementation when patient able to take PO  -monitor drain output  -incisional wound vac to low continuous wall suction    Sunshine Avendano MD  [address] [facility]

## 2020-06-04 NOTE — PLAN OF CARE
Assumed care of patient at 8am  Patient arrived in restraints on BIPAP  Patient is restless and confused/agitated. Precedex gtt started as per order  Minimal urine production noted; patient is drowsy and appears more comfortable with precedex;  Discussed wi

## 2020-06-04 NOTE — ANESTHESIA PROCEDURE NOTES
Airway  Date/Time: 6/4/2020 2:42 PM  Urgency: elective    Airway not difficult    General Information and Staff    Patient location during procedure: OR  Anesthesiologist: Latoya Mcmullen MD  Performed: anesthesiologist     Indications and Patient Condi

## 2020-06-04 NOTE — RESPIRATORY THERAPY NOTE
Patient unable to tolerate vest CPT throughout the night. Attempts to initiate CPT via percussor, did not tolerate also. BS are diminished, on bipap continuous 16/8 40%. No changes made. Will continue to monitor.

## 2020-06-04 NOTE — PROGRESS NOTES
This note also relates to the following rows which could not be included:  Resp - Cannot attach notes to unvalidated device data  Pulse - Cannot attach notes to unvalidated device data  SpO2 - Cannot attach notes to unvalidated device data       06/04/20 0

## 2020-06-04 NOTE — PROGRESS NOTES
[facility]  Progress Note    Margaret Link Patient Status:  Inpatient    1935 MRN [id number]   [facility] 6NE-A Attending Sophie Keating MD   Hosp Day # 6 PCP Shade Shaw MD       SUBJECTIVE:  No CP, SOB, or N/V.   Isaac Close SW utilized Language Line [employer]  to reach out to patient's mother. She did not answer at either listed phone number. Did not leave a VM.     GUICHO will continue phone attempts to engage family in scheduling the appointment with .    FAISAL White     Continuous  Dexmedetomidine HCl in NaCl (PRECEDEX) 400 MCG/100ML premix infusion, 0.2-1.5 mcg/kg/hr (Dosing Weight), Intravenous, Continuous  glucose (DEX4) oral liquid 15 g, 15 g, Oral, Q15 Min PRN    Or  Glucose-Vitamin C (DEX-4) chewable tab 4 tablet, 4 Intravenous, QAM AC    Or  Pantoprazole Sodium (PROTONIX) EC tab 40 mg, 40 mg, Oral, QAM AC  guaiFENesin-codeine (ROBITUSSIN AC) 100-10 MG/5ML syrup 5 mL, 5 mL, Oral, Q4H PRN  ipratropium-albuterol (DUONEB) nebulizer solution 3 mL, 3 mL, Nebulization, Q4H desats     9  PE /resp collapse     10  chf        In ccu   Filter needed with new emboli despite anticoagulation  Called   Discussed with pulm  Lung collapsed left needs bipap  Place line   ?  Infection with inflammation  Hematoma hip   Patients pro

## 2020-06-04 NOTE — ANESTHESIA POSTPROCEDURE EVALUATION
[address] [facility] Patient Status:  Inpatient   Age/Gender 81year old female MRN [id number]   [facility] 6NE-A Attending Joaquin Chino, [facility] Day # 7 PCP Terese Diterich MD       Anesthesia Post-op Note    Procedure(s):

## 2020-06-05 PROCEDURE — 99232 SBSQ HOSP IP/OBS MODERATE 35: CPT | Performed by: NURSE PRACTITIONER

## 2020-06-05 NOTE — PROGRESS NOTES
[facility]  Progress Note    Kaiden Mccrary Patient Status:  Inpatient    1935 MRN [id number]   [facility] 6NE-A Attending Austin Shaffer MD   Hosp Day # 8 PCP Maci Castrejon MD     STATUS UPDATE: Subsequent to rounding yesterda Clear anteriorly without wheezes or crackles   Chest wall: No tenderness or deformity. Heart: Regular rate and rhythm, normal sinus rhythm, normal S1S2, no murmur.    Abdomen: Soft, non-tender, non-distended, no masses, no guarding, no   rebound, positive tissues around the surgical site. With that in mind, heparin has been stopped and an IVC filter inserted.   · Thrombocytopenia-I suspect consumption although an HI PA has been ordered and will be awaited with interest.  Would plan to transfuse for a platel

## 2020-06-05 NOTE — PROGRESS NOTES
[facility]  Cardiology Critical Care Progress Note    Thelma Martinez Patient Status:  Inpatient    1935 MRN [id number]   [facility] 6NE-A Attending Hema Syed MD   Hosp Day # 8 PCP Tayler Linares MD       Subjective:  Looks 06/05/2020     06/05/2020    CO2 33.0 06/05/2020     06/05/2020    CA 7.0 06/05/2020    ALB 2.2 06/05/2020    ALKPHO 44 06/05/2020    BILT 0.6 06/05/2020    TP 4.9 06/05/2020    AST 50 06/05/2020    ALT 52 06/05/2020    MG 2.7 06/05/2020    PH pasp 44, ef 70%  5. МАРИЯ- resolved  6. Elevated lfts- up slightly with yesterdays events but had been trending down  7. Htn, diastolic dysfunction- antihypertensives remain on hold while on levo. 8. Hyperlipidemia  9.  Anxiety/depression      Plan:      Renea

## 2020-06-05 NOTE — PLAN OF CARE
Tolerating 4L HFNC. Passed bedside swallow evaluation. Repositioned every 2 hours. Wound vac and 2 Davol drains to L hip.   Restraints remain in place as pt attempting to pull out PICC line stating \"can you just get this thing off?\"  Levophed weaned o goal  Description  INTERVENTIONS:  - Encourage pt to monitor pain and request assistance  - Assess pain using appropriate pain scale  - Administer analgesics based on type and severity of pain and evaluate response  - Implement non-pharmacological measures Return mobility to safest level of function  Description  INTERVENTIONS:  - Assess patient stability and activity tolerance for standing, transferring and ambulating w/ or w/o assistive devices  - Assist with transfers and ambulation using safe patient yuan Provide frequent reorientation  - Promote wakefulness i.e. lights on, blinds open  - Promote sleep, encourage patient's normal rest cycle i.e. lights off, TV off, minimize noise and interruptions  - Encourage family to assist in orientation and promotion o

## 2020-06-05 NOTE — CM/SW NOTE
Spoke with pt's  Tracey Goins about his [facility] choice for pt. He wanted [facility] but they cannot accept pt. His second choice is [facility]. Reserved [facility] for pt on Aidin. SW to f/u with pt's d/c to [facility] when ready for d/c.

## 2020-06-05 NOTE — PROGRESS NOTES
[facility]  Progress Note    Lakeisha Merida Patient Status:  Inpatient    1935 MRN [id number]   [facility] 6NE-A Attending Yudi Mata MD   Hosp Day # 7 PCP Rosa Martino MD       SUBJECTIVE:  No CP, SOB, or N/V.   Sedated wi solution 3 mL, 3 mL, Nebulization, Q8H  ceFAZolin sodium (ANCEF/KEFZOL) 2 GM/20ML premix IV syringe 2 g, 2 g, Intravenous, Q8H  HYDROmorphone HCl (DILAUDID) 1 MG/ML injection 0.2 mg, 0.2 mg, Intravenous, Q2H PRN  vasopressin (PITRESSIN) 20 Units in sodium day  acetylcysteine (MUCOMYST) 20 % solution 2 mL, 2 mL, Nebulization, Q8H  Pantoprazole Sodium (PROTONIX) 40 mg in Sodium Chloride 0.9 % 10 mL IV push, 40 mg, Intravenous, QAM AC    Or  Pantoprazole Sodium (PROTONIX) EC tab 40 mg, 40 mg, Oral, QAM AC  gua Ativan  prozac  trazadone     6  htn  Resume meds      7  dyslipid  Continue statin      8  Pain management  Will be difficult with pulm suppresion and desats     9  PE /resp collapse     10  chf         In ccu   Filter needed with new emboli despite ant

## 2020-06-05 NOTE — PLAN OF CARE
Received pt at 1930. Pt lightly sedated/drowsy on precedex, follows commands in all extremities and is oriented to person, place and time but not to situation. Pt able to lightly wiggle toes on L foot, got slightly better throughout shift.  Pt on biPAP, wally

## 2020-06-05 NOTE — PLAN OF CARE
Patient received from OR at 1725  Patient self extubated during change of mouth tape.    Placed on BIPAP; DR Mariel Camargo notified; ABG and portable CXR done  Patient very agitated, flailing in bed; precedex restarted  DR Kadi Hernandez notified for pain control and w

## 2020-06-05 NOTE — PROGRESS NOTES
This note also relates to the following rows which could not be included:  Resp - Cannot attach notes to unvalidated device data  Pulse - Cannot attach notes to unvalidated device data  SpO2 - Cannot attach notes to unvalidated device data       06/05/20 0

## 2020-06-05 NOTE — RESPIRATORY THERAPY NOTE
LARISSA - Equipment Use Daily Summary:  · Set Mode   · Usage in hours:   · 90% Pressure (EPAP) level:   · 90% Insp Pressure (IPAP):   · AHI:   · Supplemental Oxygen:   · Comments: PT ON V60. CHARTING IN Epic

## 2020-06-05 NOTE — OCCUPATIONAL THERAPY NOTE
Attempted to see the patient for OT session. Pt commented, \"Not now, no. Who, me?\"  Pt also talked about giving away $100. Chest CT 6/2 showed new PE. 6/3 IVC filter. On 6/4 I&D L thigh hematoma and wound vac placement.  Intubated, but self-extubated on 6

## 2020-06-06 PROCEDURE — 99232 SBSQ HOSP IP/OBS MODERATE 35: CPT | Performed by: INTERNAL MEDICINE

## 2020-06-06 NOTE — PROGRESS NOTES
[facility]  Progress Note    Temitope Zuniga Patient Status:  Inpatient    1935 MRN [id number]   [facility] 6NE-A Attending Kenneth Landers MD   Hosp Day # 9 PCP Angelina Mendez MD     Subjective:  No cardiac issues over night. operative repair, left, closed, initial encounter [facility])    Hip fracture, right, closed, initial encounter ([address]    Acute pulmonary embolism ([address]    Acute pulmonary edema ([address]    Anxiety disorder    Depressive disorder      Compensated cardiac status post

## 2020-06-06 NOTE — PLAN OF CARE
Attempted to remove restraints, patient lasted less than an  Half hour before she was picking at her armbands even after reminding her not to pick at things. Patient continues to be positive for delirium. Disoriented to date/day of the week.  Pleasant w severity of pain and evaluate response  - Implement non-pharmacological measures as appropriate and evaluate response  - Consider cultural and social influences on pain and pain management  - Manage/alleviate anxiety  - Utilize distraction and/or relaxatio Progressing     Problem: Delirium  Goal: Minimize duration of delirium  Description  Interventions:  - Encourage use of hearing aids, eye glasses  - Promote highest level of mobility daily  - Provide frequent reorientation  - Promote wakefulness i.e. light

## 2020-06-06 NOTE — PHYSICAL THERAPY NOTE
PHYSICAL THERAPY RE-EVALUATION - INPATIENT     Room Number: 6522/3082-G  Evaluation Date: 6/6/2020  Type of Evaluation: Re-evaluation  Physician Order: PT Eval and Treat    Presenting Problem: S/p L hip hemiarthroplasty, post-op hematoma s/p evacuati BRONCHOSCOPY N/A 4/17/2020    Performed by Dayan Curry MD at [facility] ENDOSCOPY   • BRONCHOSCOPY N/A 6/17/2019    Performed by Guillermina Mike MD at [facility] ENDOSCOPY   • BRONCHOSCOPY N/A 4/30/2019    Performed by Guillermina Mike MD at [address] not on home O2. SUBJECTIVE  \"Get this off me. \" Pt distracted by many lines during session. Patient self-stated goal is - unable to state meaningful goal at this time, but did express that she wanted to sit up.     OBJECTIVE  Precautions: REBEKAH - poste ACTIVITY TOLERANCE  Pulse: 72  Heart Rate Source: Monitor  Resp: 16  BP: 105/90(Previous reading in supine was 112/70)  BP Location: Left arm(Forearm)  BP Method: Automatic  Patient Position: Sitting    O2 WALK           Ambulation oxygen flow (liters increased O2 to 10L to support the session. Pt completed one attempt at sit<>stand w/ max a of 2, but pt resisting the motion at this time. Pt returned to supine w/ max a of 2. Pt completed rolling side to side 2x each side.   Min a going to her right, m of Visits to Meet Established Goals: 7      CURRENT GOALS    Goal #1 Patient is able to demonstrate supine - sit EOB @ level: minimum assistance     Goal #2 Patient is able to demonstrate transfers Sit to/from Stand at assistance level: moderate assistance

## 2020-06-06 NOTE — PLAN OF CARE
Assumed care of Hardy Nigel @ approximately 2905: awake, alert, oriented to self, place and situation; frequently confused, able to be reoriented albeit not for very long; mostly pleasant, and predominantly cooperative with care w/precedex gtt infusing and bilate skin care algorithm/standards of care as needed  6/6/2020 2542 by Lizz Cardona, RN  Outcome: Progressing  6/6/2020 0630 by Lizz Cardona, RN  Outcome: Not Progressing     Problem: Impaired Activities of Daily Living  Goal: Achieve highest/safest level Anticipate increased pain with activity and pre-medicate as appropriate  6/6/2020 2309 by Louise Chavarria RN  Outcome: Not Progressing  6/6/2020 0630 by Louise Chavarria RN  Outcome: Not Progressing     Problem: Patient/Family Goals  Goal: Patient/Family and limitations with patient/family  6/6/2020 0632 by Scott Echevarria RN  Outcome: Not Progressing  6/6/2020 0630 by Scott Echevarria RN  Outcome: Not Progressing  Goal: Maintain proper alignment of affected body part  Description  INTERVENTIONS:  - Suppo Control  Goal: Glucose maintained within prescribed range  Description  INTERVENTIONS:  - Monitor Blood Glucose as ordered  - Assess for signs and symptoms of hyperglycemia and hypoglycemia  - Administer ordered medications to maintain glucose within targe

## 2020-06-06 NOTE — PROGRESS NOTES
[facility]  Progress Note    Margaret Link Patient Status:  Inpatient    1935 MRN [id number]   [facility] 6NE-A Attending Sophie Keating MD   Hosp Day # 9 PCP Shade Shaw MD       SUBJECTIVE:  No CP, SOB, or N/V.   On bipap mg, Intravenous, Q6H PRN  methylPREDNISolone Sodium Succ (Solu-MEDROL) injection 60 mg, 60 mg, Intravenous, Q8H  sodium chloride 3 % nebulizer solution 3 mL, 3 mL, Nebulization, Q8H  HYDROmorphone HCl (DILAUDID) 1 MG/ML injection 0.2 mg, 0.2 mg, Intravenou solution 3 mL, 3 mL, Nebulization, 6 times per day  acetylcysteine (MUCOMYST) 20 % solution 2 mL, 2 mL, Nebulization, Q8H  Pantoprazole Sodium (PROTONIX) 40 mg in Sodium Chloride 0.9 % 10 mL IV push, 40 mg, Intravenous, QAM AC    Or  Pantoprazole Sodium (P  Copd and bronchial malacia  Nebs   cpap  pulm consult  cxr     5  Anxiety   Ativan  prozac  trazadone     6  htn  Resume meds      7  dyslipid  Continue statin      8  Pain management  Will be difficult with pulm suppresion and desats     9  PE [place] Form Rui Able

## 2020-06-06 NOTE — PROGRESS NOTES
06/05/20 1729   Oxygen Utilization   $ RT Standby Charge (per 15 min) 1   $ [facility] HF Adult Cannula Daily Yes   $ Oxygen in use? Yes   O2 Device High flow/High humidity   O2 Flow Rate (L/min) 8 L/min   SpO2 100 %   Pt received on BIPAP at 0700.  ABG was done

## 2020-06-06 NOTE — PROGRESS NOTES
[facility]  Progress Note    Zackary Merlin Patient Status:  Inpatient    1935 MRN [id number]   [facility] 6NE-A Attending Princess Darron MD   [facility] Day # 9 PCP Dexter Leal MD     Subjective:  Zackary Merlin is a(n) 81year old 9. 3* 8.9* 9.3* 9.3*   .0 124.0* 131.0* 121.0* 133.0*     Recent Labs     06/04/20  0402 06/04/20  1812 06/05/20  0405 06/06/20  0448    136 136 139   K 3.4* 4.2 3.6 4.5    104 100 103   CO2 28.0 27.0 33.0* 33.0*   BUN 41* 32* 30* 29*   C exacerbation/tracheobronchial malacia and fluid overload-  · Left hip bleed with evidence of sciatic compression now status post evacuation of left thigh hematoma  · Recurrent PE now status post filter placement  · Thrombocytopenia-negative HIPAA  · severe

## 2020-06-07 NOTE — PLAN OF CARE
1010: [employer] notified of order for bilateral prafo boots. 1047: [employer] asked to hold on prafo boots until patient seen by ortho today. Precedex weaned off.  Patient remains disoriented to date, positive for delirium, pleasant and ca

## 2020-06-07 NOTE — PROGRESS NOTES
[facility] Progress Note    Subjective: No acute events. Got up and sat at bedside with therapy.     Objective: No acute distress  Disoriented  Drains w/ low output - discontinued, incision c/d/i  LLE able to wiggle toes slightly, 1-2/5 motor; sensation ques

## 2020-06-07 NOTE — PLAN OF CARE
Assumed care of patient at 1930 hrs. Patient agitated and delirious. She was alert to self and place. Ativan given and Precedex maintained.  Patient calmed down for a while and was following commands, she was able to move left leg and wiggle her toes minima

## 2020-06-07 NOTE — PROGRESS NOTES
[facility]  Progress Note    Kamille Meadows Patient Status:  Inpatient    1935 MRN [id number]   [facility] 6NE-A Attending Bárbara Harris MD   Hosp Day # 10 PCP Faviola Fang MD       SUBJECTIVE:  No CP, SOB, or N/V.   Precious Carbone Nightly  melatonin tab TABS 3 mg, 3 mg, Oral, Nightly  acetaminophen (OFIRMEV) infusion 1,000 mg, 1,000 mg, Intravenous, Q6H PRN  sodium chloride 3 % nebulizer solution 3 mL, 3 mL, Nebulization, Q8H  HYDROmorphone HCl (DILAUDID) 1 MG/ML injection 0.2 mg, 0 (DUONEB) nebulizer solution 3 mL, 3 mL, Nebulization, 6 times per day  acetylcysteine (MUCOMYST) 20 % solution 2 mL, 2 mL, Nebulization, Q8H  Pantoprazole Sodium (PROTONIX) 40 mg in Sodium Chloride 0.9 % 10 mL IV push, 40 mg, Intravenous, QAM AC    Or  Pan  Hypokalemia  Improved      4  Copd and bronchial malacia  Nebs   cpap  pulm consult  cxr     5  Anxiety   Ativan  prozac  trazadone     6  htn  Resume meds      7  dyslipid  Continue statin      8  Pain management  Will be difficult with pulm suppresion a

## 2020-06-07 NOTE — RESPIRATORY THERAPY NOTE
Received patient on 14L HFNC, weaned as tolerated to 8L overnight. Attempted Metaneb and vest treatment @ approximately 2330, patient tolerated very poorly; yelling out, wiggling out of mask, very agitated overall.   Patient's 3AM treatments were skipped t

## 2020-06-07 NOTE — PLAN OF CARE
Assumed care of patient at [address] [place] [address] Rd. Patient resting in bed asleep. Assessment revealed confusion, only alert to self. Anxiety/agitation upon awakening. PRN ativan and precedex remains infusing. SB on tele, precedex titrated down slightly.  Bilateral soft wrist

## 2020-06-07 NOTE — PROGRESS NOTES
[facility]  Progress Note    Akua Giraldo Patient Status:  Inpatient    1935 MRN [id number]   [facility] 6NE-A Attending Elizabeth Stewart MD   Hosp Day # 10 PCP Soraida Prabhakar MD     Subjective:  Akua Giraldo is a(n) 81year old 06/05/20  0405 06/06/20  0448 06/07/20  0432    136 139 140   K 4.2 3.6 4.5 4.3    100 103 105   CO2 27.0 33.0* 33.0* 33.0*   BUN 32* 30* 29* 26*   CREATSERUM 0.87 0.84 0.61 0.63     Recent Labs   Lab 06/03/20  0023 06/03/20  9173 06/04/20  040 fluid overload-  · Left hip bleed with evidence of sciatic compression now status post evacuation of left thigh hematoma  · 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

## 2020-06-07 NOTE — PROGRESS NOTES
[facility]  Progress Note    Chica  Patient Status:  Inpatient    1935 MRN [id number]   [facility] 6NE-A Attending Lorna Callaway MD   Hosp Day # 10 PCP Kalli Dawn MD       SUBJECTIVE:  No CP, SOB, or N/V.   Confused tab 17.2 mg, 17.2 mg, Oral, Nightly  melatonin tab TABS 3 mg, 3 mg, Oral, Nightly  acetaminophen (OFIRMEV) infusion 1,000 mg, 1,000 mg, Intravenous, Q6H PRN  sodium chloride 3 % nebulizer solution 3 mL, 3 mL, Nebulization, Q8H  HYDROmorphone HCl (DILAUDID) day  acetylcysteine (MUCOMYST) 20 % solution 2 mL, 2 mL, Nebulization, Q8H  Pantoprazole Sodium (PROTONIX) 40 mg in Sodium Chloride 0.9 % 10 mL IV push, 40 mg, Intravenous, QAM AC    Or  Pantoprazole Sodium (PROTONIX) EC tab 40 mg, 40 mg, Oral, QAM AC  gua malacia  Nebs   cpap  pulm consult  cxr     5  Anxiety   Ativan  prozac  trazadone     6  htn  Resume meds      7  dyslipid  Continue statin      8  Pain management  Will be difficult with pulm suppresion and desats     9  PE /resp collapse     10  chf

## 2020-06-08 NOTE — OCCUPATIONAL THERAPY NOTE
OCCUPATIONAL THERAPY TREATMENT NOTE - INPATIENT     Room Number: 5549/4570-E  Session: re-evaluation s/p left thigh hematoma evacuation  Number of Visits to Meet Established Goals: 7    Presenting Problem: Fall; L hip fx s/p L REBEKAH 5/29/20; RRT 5/31/20 d/t Procedure Laterality Date   • BRONCHOSCOPY N/A 4/20/2020    Performed by Lela Larsen MD at [facility] ENDOSCOPY   • BRONCHOSCOPY N/A 4/17/2020    Performed by Golden Weaver MD at [facility] ENDOSCOPY   • BRONCHOSCOPY N/A 6/17/2019    Performed by Liborio Buchanan as Tolerated(and AFO in room (no shoes))    PAIN ASSESSMENT  Rating: Unable to rate  Location: left hip, leg, buttocks  Management Techniques: Repositioning; Activity promotion     ACTIVITY TOLERANCE                         O2 SATURATIONS                ACT changes: left acute sciatic nerve palsy w/ emergent left thigh hematoma evacuation done 6/4/20. Patient is now being seen to re-assess functional status. Patient is supervision to total assist for BADLs and functional mobility.   This is a decline in func

## 2020-06-08 NOTE — PROGRESS NOTES
[facility]  Progress Note    Terry Pacheco Patient Status:  Inpatient    1935 MRN [id number]   [facility] 6NE-A Attending Rahul Shah MD   [address] Day # 11 PCP Ike Juan MD     Subjective:  Terry Pacheco is a(n) 81year old 06/06/20  0448  06/07/20  0432 06/07/20  1632 06/08/20  0528   RBC 3.22*   < > 3.12* 3.31* 3.10*  3.10*   HGB 9.3*   < > 9.1* 9.8* 9.0*  9.0*   HCT 28.7*   < > 28.4* 30.3* 28.3*  28.3*   MCV 89.1   < > 91.0 91.5 91.3  91.3   MCH 28.9   < > 29.2 29.6 29.0 medications-psychiatry input appreciated  · As ordered     Kwesi Donaldson SR.  Crit care 35  6/8/2020  9:43 AM

## 2020-06-08 NOTE — PLAN OF CARE
Problem: RESPIRATORY - ADULT  Goal: Achieves optimal ventilation and oxygenation  Description  INTERVENTIONS:  - Assess for changes in respiratory status  - Assess for changes in mentation and behavior  - Position to facilitate oxygenation and minimize r function  Description  INTERVENTIONS:  - Assess patient stability and activity tolerance for standing, transferring and ambulating w/ or w/o assistive devices  - Assist with transfers and ambulation using safe patient handling equipment as needed  - Ensure directed  - See additional Care Plan goals for specific interventions   Outcome: Progressing     Problem: Delirium  Goal: Minimize duration of delirium  Description  Interventions:  - Encourage use of hearing aids, eye glasses  - Promote highest level of m

## 2020-06-08 NOTE — PHYSICAL THERAPY NOTE
PHYSICAL THERAPY TREATMENT NOTE - INPATIENT    Room Number: 4341/8597-U     Session: 1   Number of Visits to Meet Established Goals: 7  Pt admitted 5/28/20 due to fall resulting in left hip fracture, s/p posterior cemented left hip  hemiarthroplasty 5/29/ BRONCHOSCOPY N/A 6/17/2019    Performed by Marlen Morley MD at [facility] ENDOSCOPY   • BRONCHOSCOPY N/A 4/30/2019    Performed by aMrlen Morley MD at [place] [address]. ( promotion;Repositioning    BALANCE                                                                                                                     Static Sitting: Poor +  Dynamic Sitting: Poor           Static Standing: Dependent  Dynamic Standing: Not with assist ranging from mod to min a. Lateral transfer to L with total assist.  After seated rest attempted sit to stand x 2 attempts to rw with total assist (max a x 2) unable to obtain upright.   Patient not assisting with UEs and attempting to pull on

## 2020-06-08 NOTE — PLAN OF CARE
Assumed care of pt at 0730. She is alert/oriented x 4. Delirium tested negative but at times she is very anxious and says odd statements that sounds like she is confused/underlying dementia.  PT/OT at bedside, worked with pt and she was able to get to the c scale  - Administer analgesics based on type and severity of pain and evaluate response  - Implement non-pharmacological measures as appropriate and evaluate response  - Consider cultural and social influences on pain and pain management  - Manage/alleviat transferring and ambulating w/ or w/o assistive devices  - Assist with transfers and ambulation using safe patient handling equipment as needed  - Ensure adequate protection for wounds/incisions during mobilization  - Obtain PT/OT consults as needed  - Adv Encourage family to assist in orientation and promotion of home routines  Outcome: Progressing

## 2020-06-08 NOTE — CM/SW NOTE
Care Progression Note:  Active Acute Medical Issue:   Closed fracture of left hip, initial encounter [facility])- L hemiarthroplasty 0/20 complicated with a L hip bleed & sciatic compression- evacuated on 6/4  Pulmonary embolus- IVC filter placed.   Anxiety/depre

## 2020-06-08 NOTE — DIETARY NOTE
[facility]    NUTRITION ASSESSMENT    Pt does not meet malnutrition criteria.     NUTRITION DIAGNOSIS/PROBLEM:    Inadequate oral intake related to physiological causes as evidenced by estimated intake less than estimated needs - NPO status    NUTRITIO to maximize  Food Allergies: No  Cultural/Ethnic/Mandaen Preferences Addresses: Yes    NUTRITION RELATED PHYSICAL FINDINGS:     1. Body Fat/Muscle Mass: well nourished per visual exam.     2. Fluid Accumulation: lower extremity edema per visual exam.

## 2020-06-08 NOTE — PROGRESS NOTES
Assumed care of pt at [address] [facility] [address]. Currently S/P L hip hemiarthroplasty POD#10, S/P L thigh hematoma evacuation POD #4. Denies c/o C/P or SOB. HFNC 12L rales per ausc, prod cough afebrile, ABX per order. Medicated for hip pain. Decrease sensation to L foot.  Drng

## 2020-06-09 NOTE — PLAN OF CARE
Received pt at 1930. Pt alert and oriented x4 but has some moderate anxiety. She has some decreased sensation and tingling in her LLE. Pt able to lightly move L foot. Able to follow commands in all extremities.  Pt on HFNC, lungs diminished with a productiv

## 2020-06-09 NOTE — PLAN OF CARE
Assumed care of pt at 0730. She is alert/oriented x 4. She is extremely anxious at times.  She did have an episode this morning around 10 am after assisting her back to bed using the sit to stand where she could not settle down, her sats dropped into 70s, management  - Manage/alleviate anxiety  - Utilize distraction and/or relaxation techniques  - Monitor for opioid side effects  - Notify MD/LIP if interventions unsuccessful or patient reports new pain  - Anticipate increased pain with activity and pre-medi consults as needed  - Advance activity as appropriate  - Communicate ordered activity level and limitations with patient/family  Outcome: Progressing  Goal: Maintain proper alignment of affected body part  Description  INTERVENTIONS:  - Support and protect

## 2020-06-09 NOTE — PROGRESS NOTES
[facility]  Progress Note    Chepe Cardoza Patient Status:  Inpatient    1935 MRN [id number]   [facility] 6NE-A Attending Eugenio Garner MD   [facility] Day # 12 PCP Shawnee Cerna MD     Subjective:  Chepe Cardoza is a(n) 81year old < > 9.0*  9.0* 10.0* 8.2*   HCT 28.7*   < > 28.4*   < > 28.3*  28.3* 30.4* 26.0*   MCV 89.1   < > 91.0   < > 91.3  91.3 91.3 91.9   MCH 28.9   < > 29.2   < > 29.0  29.0 30.0 29.0   MCHC 32.4   < > 32.0   < > 31.8  31.8 32.9 31.5   RDW 15.2*   < > 15.4* Anika PÉREZ.  Crit care 35  6/9/2020  9:53 AM

## 2020-06-09 NOTE — PROGRESS NOTES
[facility]  Progress Note    Chepe Part Patient Status:  Inpatient    1935 MRN [id number]   [facility] 6NE-A Attending Eugenio Garner MD   [facility] Day # 6 PCP Shawnee Cerna MD       SUBJECTIVE:  No CP, SOB, or N/V.   Alert on HS  methylPREDNISolone Sodium Succ (Solu-MEDROL) injection 32 mg, 32 mg, Intravenous, Q12H  traZODone HCl (DESYREL) tab 50 mg, 50 mg, Oral, Nightly  FLUoxetine HCl (PROZAC) cap 20 mg, 20 mg, Oral, Daily  traMADol HCl (ULTRAM) tab 50 mg, 50 mg, Oral, Q6H OK enema, Rectal, Once PRN  EPINEPHrine HCl (ADRENALIN) 1 MG/ML injection (Allergic Reaction Kit) 1 mg, 1 mg, Injection, PRN  acetylcysteine (MUCOMYST) 20 % solution 2 mL, 2 mL, Nebulization, Q8H  Pantoprazole Sodium (PROTONIX) 40 mg in Sodium Chloride 0.9 % block  POD 10  Hematoma evacuation   POD 3     2  Hyponatremia  improved      3  Hypokalemia  Improved      4  Copd and bronchial malacia  Nebs   cpap  pulm consult  cxr     5  Anxiety   Ativan  prozac  trazadone     6  htn  Resume meds      7  dyslipid  C

## 2020-06-10 NOTE — PLAN OF CARE
Received pt at 1930. Pt alert and oriented x4 but has some moderate anxiety. She has some decreased sensation and tingling in her LLE. Pt able to lightly move L foot. Able to follow commands in all extremities.  Pt on HFNC, but was desaturating into the 80s

## 2020-06-10 NOTE — PROGRESS NOTES
[facility]  Progress Note    Chepe Part Patient Status:  Inpatient    1935 MRN [id number]   [facility] 6NE-A Attending Eugenio Garner MD   Hosp Day # 15 PCP Shawnee Cerna MD       SUBJECTIVE:  No CP, SOB, or N/V.   Alert and Nebulization, 6 times per day  Insulin Aspart Pen (NOVOLOG) 100 UNIT/ML flexpen 2-10 Units, 2-10 Units, Subcutaneous, TID CC and HS  traZODone HCl (DESYREL) tab 50 mg, 50 mg, Oral, Nightly  FLUoxetine HCl (PROZAC) cap 20 mg, 20 mg, Oral, Daily  traMADol HC Sodium (PROTONIX) 40 mg in Sodium Chloride 0.9 % 10 mL IV push, 40 mg, Intravenous, QAM AC    Or  Pantoprazole Sodium (PROTONIX) EC tab 40 mg, 40 mg, Oral, QAM AC  guaiFENesin-codeine (ROBITUSSIN AC) 100-10 MG/5ML syrup 5 mL, 5 mL, Oral, Q4H PRN  ipratropi  htn  Resume meds      7  dyslipid  Continue statin      8  Pain management  Will be difficult with pulm suppresion and desats     9  PE /resp collapse filter place heparin held for recurrent PE and hematoma     10  chf   IV diuresis        Agree with pulm

## 2020-06-10 NOTE — PROGRESS NOTES
Patient received on Bipap 16/8/50%. FiO2 increased by RN to 60%. Continuing nebs as ordered Duoneb with Mucomyst alternating with 3% NaCl and Vest therapy Q4 hours. Will continue to monitor closely. Diminished breath sounds bilaterally.  Patient occasionall

## 2020-06-10 NOTE — OCCUPATIONAL THERAPY NOTE
OCCUPATIONAL THERAPY TREATMENT NOTE - INPATIENT     Room Number: 6639/5210-O  Session: 1 s/p reevaluation    Number of Visits to Meet Established Goals: 7    Presenting Problem: Fall; L hip fx s/p L REBEKAH 5/29/20; RRT 5/31/20 d/t PE; left thigh hematoma evac BRONCHOSCOPY N/A 4/20/2020    Performed by Aden Prince MD at [facility] ENDOSCOPY   • BRONCHOSCOPY N/A 4/17/2020    Performed by Danica Hernandez MD at [facility] ENDOSCOPY   • BRONCHOSCOPY N/A 6/17/2019    Performed by Aden Prince MD at [place] [address] Tolerated(and AFO in room (no shoes))    PAIN ASSESSMENT  Rating: Unable to rate  Location: LLE  Management Techniques: Repositioning     ACTIVITY TOLERANCE                         O2 SATURATIONS           Ambulation oxygen flow (liters per minute): (vapot ADL/functional transfers. Subacute rehab is recommended for 18-21 days. After this period of rehabilitation patient should achieve min-mod A level in BADLs and functional mobility.     OT Discharge Recommendations: Sub-acute rehabilitation(ELOS: 18

## 2020-06-10 NOTE — CM/SW NOTE
Care Progression Note:  Active Acute Medical Issue:  - Closed fracture of left hip, initial encounter ([facility] [address] POD 12 L HIP HEMIARTHROPLASTY, complicated with a L hip bleed & sciatic compression- evacuated on 6/4.  -Acute hypoxic respiratory failure, COPD exa

## 2020-06-10 NOTE — PLAN OF CARE
Assumed care of patient at 0730, a&o x4, follows commands. LLE states decreased sensation. Able to move LLE 2/5, weak DF/PF. Pt anxious and forgetful at times. Haldol 1 mg prn given for increased agitation.  Pt switched to vapotherm 30 L, 100% per pulmonary

## 2020-06-10 NOTE — DIETARY NOTE
Clinical Nutrition    Calorie Count ordered - no tickets saved. Per RN documentation in EMR, pt has been eating <25% over the past few days. Overall pt has not been meeting nutritional needs since admission.       Recommend DHT placement to help meet nutr

## 2020-06-10 NOTE — PROGRESS NOTES
[facility]  Progress Note    Zohra Baez Patient Status:  Inpatient    1935 MRN [id number]   [facility] 6NE-A Attending Tracie Pollock MD   [facility] Day # 15 PCP Irving Clarke MD     Subjective:  Zohra Baez is a(n) 81year old HGB 9.3*   < > 9.1*   < > 9.0*  9.0* 10.0* 8.2* 10.2*   HCT 28.7*   < > 28.4*   < > 28.3*  28.3* 30.4* 26.0* 32.1*   MCV 89.1   < > 91.0   < > 91.3  91.3 91.3 91.9 92.0   MCH 28.9   < > 29.2   < > 29.0  29.0 30.0 29.0 29.2   MCHC 32.4   < > 32.0   < > 31 will be made to avoid increasing sedation anti-anxiety and pain medications-psychiatry input appreciated  · Consider a Dobbhoff tube to supplement nutrition  · As ordered     Becky Donaldson SR. critcare 35  6/10/2020  7:07 AM

## 2020-06-10 NOTE — PROGRESS NOTES
[facility]  Progress Note    Celina Armstrong Patient Status:  Inpatient    1935 MRN [id number]   [facility] 6NE-A Attending Annel Jha MD   Hosp Day # 15 PCP Chloe Sotelo MD       SUBJECTIVE:  No CP, SOB, or N/V.   Alert con Nebulization, 6 times per day  Insulin Aspart Pen (NOVOLOG) 100 UNIT/ML flexpen 2-10 Units, 2-10 Units, Subcutaneous, TID CC and HS  traZODone HCl (DESYREL) tab 50 mg, 50 mg, Oral, Nightly  FLUoxetine HCl (PROZAC) cap 20 mg, 20 mg, Oral, Daily  traMADol HC Sodium (PROTONIX) 40 mg in Sodium Chloride 0.9 % 10 mL IV push, 40 mg, Intravenous, QAM AC    Or  Pantoprazole Sodium (PROTONIX) EC tab 40 mg, 40 mg, Oral, QAM AC  guaiFENesin-codeine (ROBITUSSIN AC) 100-10 MG/5ML syrup 5 mL, 5 mL, Oral, Q4H PRN  ipratropi  htn  Resume meds      7  dyslipid  Continue statin      8  Pain management  Will be difficult with pulm suppresion and desats     9  PE /resp collapse filter place heparin held for recurrent PE and hematoma     10  chf   IV diuresis        Agree with pulm

## 2020-06-10 NOTE — PHYSICAL THERAPY NOTE
PHYSICAL THERAPY TREATMENT NOTE - INPATIENT    Room Number: 1696/6839-I     Session: 2   Number of Visits to Meet Established Goals: 7    Presenting Problem: S/p L hip hemiarthroplasty, post-op hematoma s/p evacuation, PE    Pt admitted 5/28/20 due to fal • BRONCHOSCOPY N/A 6/17/2019    Performed by Jacob Schaefer MD at [facility] ENDOSCOPY   • BRONCHOSCOPY N/A 4/30/2019    Performed by Jacob Schaefer MD at [address] [place] [address] [place] promotion;Repositioning    BALANCE                                                                                                                     Static Sitting: Fair -  Dynamic Sitting: Poor +           Static Standing: Not tested  Dynamic Standing: cleaned by rn staff at end of session, but recommended that once clean-up complete, pt should be transitioned to chair mode. This is pt's 3rd episode of diarrhea today, so transfer to chair is limited.     THERAPEUTIC EXERCISES  Lower Extremity Lisa'viky salmon

## 2020-06-10 NOTE — PROGRESS NOTES
[facility] Progress Note     Subjective: No acute events.  Got up and sat at bedside with therapy.     Objective: No acute distress, awoken from sleep  Incision dressing c/d/i  LLE able to wiggle toes slightly, 1-2/5 motor; sensation questionably intact (pat

## 2020-06-11 NOTE — PLAN OF CARE
Received pt at 1930. Pt alert and oriented x4. LLE has some decreased senation and some tingling. She is able to weakly move her toes and has marginally more strength in her leg. Pt on BiPAP, tolerating well, lungs diminished with a non-productive cough.  P

## 2020-06-11 NOTE — PROGRESS NOTES
[facility]  Progress Note    Chan Rivera Patient Status:  Inpatient    1935 MRN [id number]   [facility] 6NE-A Attending Garry Cain MD   [facility] Day # 15 PCP Prudence Olson MD     Subjective:  Chan Rivera is a(n) 81year old 10.2* 10.4* 8.3*   HCT 28.4*   < > 28.3*  28.3*   < > 32.1* 32.3* 25.9*   MCV 91.0   < > 91.3  91.3   < > 92.0 90.7 90.9   MCH 29.2   < > 29.0  29.0   < > 29.2 29.2 29.1   MCHC 32.0   < > 31.8  31.8   < > 31.8 32.2 32.0   RDW 15.4*   < > 15.8*  15.8*   < > Precedex     Plan:  · Continue diuretics as tolerated/add albumin  · Every effort will be made to avoid increasing sedation anti-anxiety and pain medications-psychiatry input appreciated  · Consider a Dobbhoff tube to supplement nutrition  · As ordered

## 2020-06-11 NOTE — TELEPHONE ENCOUNTER
COLLEEN for Grazyna Garcia requesting a call back to confirm her appointment tomorrow 6/12/2020 at 10:30am.  Office phone number provided.

## 2020-06-11 NOTE — DIETARY NOTE
[facility]    NUTRITION ASSESSMENT    Pt does not meet malnutrition criteria.     NUTRITION DIAGNOSIS/PROBLEM:    Inadequate oral intake related to physiological causes as evidenced by estimated intake less than estimated needs - NPO status - ongoing ANTHROPOMETRICS:  Ht: 165.1 cm (5' 5\")  Wt: 86.6 kg (190 lb 14.7 oz). This is 153 % of IBW  BMI: Body mass index is 31.77 kg/m².   IBW: 56.8 kg    WEIGHT HISTORY:   Wt Readings from Last 12 Encounters:  06/11/20 : 86.6 kg (190 lb 14.7 oz)  04/21/20 : 8

## 2020-06-12 NOTE — OCCUPATIONAL THERAPY NOTE
Attempted to see patient this am, however patient receiving chest PT treatment and going down to xray for NGT placement. Will follow-up as time permits.

## 2020-06-12 NOTE — PHYSICAL THERAPY NOTE
Physical Therapy    Attempted to treat pt this a.m., going for test.  Will re-attempt as schedule allows.

## 2020-06-12 NOTE — OCCUPATIONAL THERAPY NOTE
OCCUPATIONAL THERAPY TREATMENT NOTE - INPATIENT     Room Number: 6037/9412-P  Session: 2 s/p reevaluation    Number of Visits to Meet Established Goals: 7    Presenting Problem: Fall; L hip fx s/p L REBEKAH 5/29/20; RRT 5/31/20 d/t PE; left thigh hematoma evac History  Past Surgical History:   Procedure Laterality Date   • BRONCHOSCOPY N/A 4/20/2020    Performed by Cuca Olivares MD at [facility] ENDOSCOPY   • BRONCHOSCOPY N/A 4/17/2020    Performed by Tomas Sanon MD at [facility] ENDOSCOPY   • BRONCHOSCOPY N/A 6/17/20 lower extremity           L Lower Extremity: Weight Bearing as Tolerated(and AFO in room (no shoes))    PAIN ASSESSMENT  Rating: Unable to rate  Location: LLE  Management Techniques: Repositioning     ACTIVITY TOLERANCE  Pulse: 100  Heart Rate Source: Andrea Gamez patient RR down to 30's, o2 sats: 89% and RN adjusted Vapotherm setting to 30L, 100%. o2 sats recovered to 92% within a min of adjustments. Patient End of Session: In bed; With [address] staff;Needs met;Call light within reach;RN aware of session/findings; All pa

## 2020-06-12 NOTE — PLAN OF CARE
Assumed care of Brisa Wilkins @ approximately 1972: awake, alert, oriented to self, place, and situation, pleasant, and cooperative with care; on HFNC 50% FIO2; NSR w/occasional PAC/PVCs on the monitor and normo-tensive; incontinent w/good U/O - Purewick in place; Impaired Activities of Daily Living  Goal: Achieve highest/safest level of independence in self care  Description  Interventions:  - Assess ability and encourage patient to participate in ADLs to maximize function  - Promote sitting position while performi precautions  -participate in pt/ot and do exercises and movements as directed  -use proper hand hygiene when changing dressings  -follow up with primary care physician, pulmonologist, and orthopedic surgeon as directed  - See additional Care Plan goals for Promote highest level of mobility daily  - Provide frequent reorientation  - Promote wakefulness i.e. lights on, blinds open  - Promote sleep, encourage patient's normal rest cycle i.e. lights off, TV off, minimize noise and interruptions  - Encourage fami

## 2020-06-12 NOTE — PLAN OF CARE
Pt remains on noc bipap 16/8 50% letitia well vapo during the day meatneb w/ vest q4 wean as letitia

## 2020-06-12 NOTE — PLAN OF CARE
Assumed care at 0730, patient resting in bed, A/Ox4, VERMA, VSS, NSR per tele, on Vapotherm at 60% O2 @ 30L, for full assessment see charting.  Taken to Xray around 0900 to place an NG tube under fluoroscopy, position verified by radiologist. Will start Lisa Quezada

## 2020-06-12 NOTE — PROGRESS NOTES
[facility]  Progress Note    Kamille Meadows Patient Status:  Inpatient    1935 MRN [id number]   [facility] 6NE-A Attending Bárbara Harris MD   Hosp Day # 13 PCP Faviola Fang MD       SUBJECTIVE:  No CP, SOB, or N/V.   Tearful a Oral, Q6H PRN  methylPREDNISolone Sodium Succ (Solu-MEDROL) injection 24 mg, 24 mg, Intravenous, Q12H  haloperidol lactate (HALDOL) 5 MG/ML injection 1 mg, 1 mg, Intravenous, Q2H PRN  ipratropium-albuterol (DUONEB) nebulizer solution 3 mL, 3 mL, Nebulizati HCl (ADRENALIN) 1 MG/ML injection (Allergic Reaction Kit) 1 mg, 1 mg, Injection, PRN  acetylcysteine (MUCOMYST) 20 % solution 2 mL, 2 mL, Nebulization, Q8H  Pantoprazole Sodium (PROTONIX) 40 mg in Sodium Chloride 0.9 % 10 mL IV push, 40 mg, Intravenous, QA  Hyponatremia  improved      3  Hypokalemia  Improved      4  Copd and bronchial malacia  Nebs   cpap  pulm consult  cxr     5  Anxiety   Ativan  prozac  trazadone     6  htn  Resume meds      7  dyslipid  Continue statin      8  Pain management  Will be d

## 2020-06-12 NOTE — PLAN OF CARE
All medications given PO with a full container of applesauce when that option was available in order to facilitate and encourage nutrition intake. Oatmeal and brown sugar ordered for breakfast -- Michi Gnosticist will most likely need much encouragement to eat.

## 2020-06-12 NOTE — PHYSICAL THERAPY NOTE
PHYSICAL THERAPY TREATMENT NOTE - INPATIENT    Room Number: 0213/9600-S     Session: 3   Number of Visits to Meet Established Goals: 7    Presenting Problem: S/p L hip hemiarthroplasty, post-op hematoma s/p evacuation, PE    Pt admitted 5/28/20 due to fal • BRONCHOSCOPY N/A 6/17/2019    Performed by Guillermina Mike MD at [facility] ENDOSCOPY   • BRONCHOSCOPY N/A 4/30/2019    Performed by Guillermina Mike MD at [address] [place] [address] [place] techniques;Relaxation    BALANCE                                                                                                                     Static Sitting: Poor +  Dynamic Sitting: Poor           Static Standing: Not tested  Dynamic Standing: Not session compared to last, but had significant medication this a.m. for ng tube placement. Pt able to complete saq in sitting L le and slightly bigger saq R le - cannot complete full rom. Completed Lisa's and hamstring stretch b le's in sitting.   Pt transfers Sit to/from Stand at assistance level: moderate assistance      Goal #3 Patient is able to ambulate 5 feet with assist device: walker - rolling at assistance level: maximum assistance of 2.       Goal #4     Goal #5     Goal #6     Goal Comments:

## 2020-06-12 NOTE — DIETARY NOTE
[facility]    NUTRITION ASSESSMENT    Pt does not meet malnutrition criteria.     NUTRITION DIAGNOSIS/PROBLEM:    Inadequate oral intake related to physiological causes as evidenced by estimated intake less than estimated needs - NPO status - ongoing noted to be dropping, found to have bleed into hip area. Plan for surgical evac of hematoma by ortho. She remains NPO at this time, on bipap. No wt loss noted. No GI distress reported at this time.      ANTHROPOMETRICS:  Ht: 165.1 cm (5' 5\")  Wt: 76.5 kg

## 2020-06-13 PROCEDURE — 99232 SBSQ HOSP IP/OBS MODERATE 35: CPT | Performed by: INTERNAL MEDICINE

## 2020-06-13 NOTE — PROGRESS NOTES
06/13/20 0103   BiPAP   $ RT Standby Charge (per 15 min) 2   $ BiPAP in use daily Yes   Device V60   BiPAP / CPAP CE# v60-1   Mode Spontaneous/Timed   Interface Full face mask   Mask Size Small   Control Settings   Set Rate 16 breaths/min   Set IPAP 16

## 2020-06-13 NOTE — PROGRESS NOTES
06/13/20 1642   BiPAP   $ RT Standby Charge (per 15 min) 1   Pt received on HFNC 40L/60% this AM at 0700. Pt oxygen saturations fluctuated between 88-94% so Fio2 was increased to 70% throughout the day. Pt was placed back on the BIPAP at 1642.  HFNC olimpia

## 2020-06-13 NOTE — PROGRESS NOTES
[facility]  Progress Note    Andrew Soriano Patient Status:  Inpatient    1935 MRN [id number]   [facility] 6NE-A Attending Jr Duke MD   [facility] Day # 12 PCP Jasmina Quinn MD       SUBJECTIVE:  No CP, SOB, or N/V.   Less anxi mg, Oral, Q4H PRN  LORazepam (ATIVAN) tab 0.5 mg, 0.5 mg, Oral, Q6H PRN  methylPREDNISolone Sodium Succ (Solu-MEDROL) injection 24 mg, 24 mg, Intravenous, Q12H  haloperidol lactate (HALDOL) 5 MG/ML injection 1 mg, 1 mg, Intravenous, Q2H PRN  ipratropium-al Kit) 1 mg, 1 mg, Injection, PRN  acetylcysteine (MUCOMYST) 20 % solution 2 mL, 2 mL, Nebulization, Q8H  Pantoprazole Sodium (PROTONIX) 40 mg in Sodium Chloride 0.9 % 10 mL IV push, 40 mg, Intravenous, QAM AC    Or  Pantoprazole Sodium (PROTONIX) EC tab 40  Copd and bronchial malacia  Nebs   cpap  pulm consult  cxr     5  Anxiety   Ativan  prozac  trazadone     6  htn  Resume meds      7  dyslipid  Continue statin      8  Pain management  Will be difficult with pulm suppresion and desats     9  PE Adonis Palma

## 2020-06-13 NOTE — PROGRESS NOTES
[facility]  Progress Note    Chica  Patient Status:  Inpatient    1935 MRN [id number]   [facility] 6NE-A Attending Lorna Callaway MD   Hosp Day # 12 PCP Kalli Dawn MD     Asked to revisit patient due to episode of af bruits. Cardiac: regular and rhythm, S1, S2 normal, no murmur  Lungs: Clear without wheezes, rales, rhonchi. Normal excursions and effort. Abdomen: Soft, non-tender. Extremities: Without clubbing, cyanosis or edema. Peripheral pulses are 2+.   Skin: W

## 2020-06-13 NOTE — PLAN OF CARE
Becoming more sleepy through the day- Dr Mariel Camargo made aware and orders to place on Bipap for now, no ABG needed. Still arousable. Vapotherm most of the day, now on bipap. Desats w/ repositioning. Rhonchi LS.  Needs encouragement to cough and expectorate ph response  - Implement non-pharmacological measures as appropriate and evaluate response  - Consider cultural and social influences on pain and pain management  - Manage/alleviate anxiety  - Utilize distraction and/or relaxation techniques  - Monitor for op

## 2020-06-13 NOTE — PLAN OF CARE
Assumed care of this patient at approximately 1930. Monitor shows SR/ST 90's-100's, SBP 90's-110's. From approximately 2010 to 2100, patient converting between SR and uncontrolled aJoseph Patino MD aware. Cardiology put on consult, in to see patient overnight.   Ashutosh Gambino care  Description  Interventions:  - Assess ability and encourage patient to participate in ADLs to maximize function  - Promote sitting position while performing ADLs such as feeding, grooming, and bathing  - Educate and encourage patient/family in [place]

## 2020-06-13 NOTE — PROGRESS NOTES
RN reviewed CXR results and tube appeared to be coiled on CXR. RN asked pt to open her mouth and a visible coil was seen in the back of the patient's mouth. RN pulled out the coil and contacted IR MD who placed to report coil and for further orders.  Re-salinas

## 2020-06-13 NOTE — PROGRESS NOTES
[facility]  Progress Note    Bonifacio Bird Patient Status:  Inpatient    1935 MRN [id number]   [facility] 6NE-A Attending Jazmin Alexander MD   [facility] Day # 12 PCP Niesha Shepherd MD     Subjective:  Bonifacio Bird is a(n) 81year old Review:  Recent Labs   Lab 06/07/20  0432  06/08/20  0528  06/11/20  0422 06/12/20  0433 06/13/20  0601   RBC 3.12*   < > 3.10*  3.10*   < > 2.85* 3.28* 3.25*   HGB 9.1*   < > 9.0*  9.0*   < > 8.3* 9.5* 9.7*   HCT 28.4*   < > 28.3*  28.3*   < > 25.9* 29.8* melatonin  3 mg Oral Nightly   • sodium chloride  3 mL Nebulization Q8H   • acetylcysteine  2 mL Nebulization Q8H   • pantoprazole (PROTONIX) IV push  40 mg Intravenous QAM AC    Or   • Pantoprazole Sodium  40 mg Oral QAM AC           Assessment:  · Acute at high risk for clinical deterioration.

## 2020-06-14 NOTE — PLAN OF CARE
Assumed care of this patient at approximately 1930. Monitor shows SB/SR 50's-80's, SBP 80's-110's. Precedex infusing. Pain medication given as ordered for pain. Patient A&0 X3, drowsy, easily awakens, able to tolerate bipap throughout the night.  See docume

## 2020-06-14 NOTE — PROGRESS NOTES
[facility]  Progress Note    Josh Martinez Patient Status:  Inpatient    1935 MRN [id number]   [facility] 6NE-A Attending Don Ha MD   [facility] Day # 16 PCP Seun Hallman MD     Subjective:  Josh Martinez is a(n) 81year old 3.10*   < > 2.85* 3.28* 3.25* 3.48*   HGB 9.0*  9.0*   < > 8.3* 9.5* 9.7* 10.2*   HCT 28.3*  28.3*   < > 25.9* 29.8* 30.0* 32.4*   MCV 91.3  91.3   < > 90.9 90.9 92.3 93.1   MCH 29.0  29.0   < > 29.1 29.0 29.8 29.3   MCHC 31.8  31.8   < > 32.0 31.9 32.3 3 (PROTONIX) IV push  40 mg Intravenous QAM AC    Or   • Pantoprazole Sodium  40 mg Oral QAM AC           Assessment:  · Acute hypoxic respiratory failure- acute decompensation likely due to COPD exacerbation/tracheobronchial malacia and fluid overload compl

## 2020-06-14 NOTE — PROGRESS NOTES
[facility]  Progress Note    Akua Giraldo Patient Status:  Inpatient    1935 MRN [id number]   [facility] 6NE-A Attending Elizabeth Stewart MD   Hosp Day # 16 PCP Soraida Prabhakar MD       SUBJECTIVE:  No CP, SOB, or N/V.   Stable Subcutaneous, TID CC and HS  traZODone HCl (DESYREL) tab 50 mg, 50 mg, Oral, Nightly  FLUoxetine HCl (PROZAC) cap 20 mg, 20 mg, Oral, Daily  traMADol HCl (ULTRAM) tab 50 mg, 50 mg, Oral, Q6H PRN  docusate sodium (COLACE) cap 100 mg, 100 mg, Oral, BID  Edward Phillips solution 3 mL, 3 mL, Nebulization, Q4H PRN          Assessment  Patient Active Problem List:     HTN (hypertension)     Anxiety     Tobacco abuse, episodic     Pericolonic abscess due to diverticulitis     Diverticulosis     Fistula     Pericardial effusio suppression   Patient remains precarious   Defer to pulm for pulm management         cxr fluid overload   increase diuresis to 40 bid  Minimize sedation and pain medication   Improving slowly  cxr improving      Slow progress but is improving   cpm

## 2020-06-14 NOTE — PROGRESS NOTES
06/14/20 1542   Oxygen Utilization   $ RT Standby Charge (per 15 min) 1   $ Oxygen in use?  Yes   O2 Device High flow/High humidity   O2 Flow Rate (L/min) 40 L/min   FiO2 (%) 40 %   SpO2 97 %   Pt received off of BIPAP and back on the Vapotherm this AM a

## 2020-06-14 NOTE — PLAN OF CARE
0800 Received pt A/Ox4, anxious, states can't breathe. RR 36-40, course bilat, SPO2 86% on vapotherm 40L 50%, increased to 60% and relaxation technicques attempted, RT at bedside- placed on BIPAP 16/8 50%, precedex adjusted.  L hip pain with movement, bruis

## 2020-06-15 NOTE — DIETARY NOTE
[facility]    NUTRITION ASSESSMENT    Pt does not meet malnutrition criteria.     NUTRITION DIAGNOSIS/PROBLEM:    Inadequate oral intake related to physiological causes as evidenced by estimated intake less than estimated needs - NPO status - ongoing experienced tachycardia & hypotension. Pt placed on Bipap and transferred to ICU. Hgb noted to be dropping, found to have bleed into hip area. Plan for surgical evac of hematoma by ortho. She remains NPO at this time, on bipap. No wt loss noted.  No GI dis

## 2020-06-15 NOTE — PLAN OF CARE
Received pt on bipap and precedex gtt this am.  Received orders for CT chest; completed with results reported to pulmonary service. Pt transitioned to vapotherm and trying to avoid positive pressure related to diffuse pneumomediastinum as per CT results.

## 2020-06-15 NOTE — OCCUPATIONAL THERAPY NOTE
Attempted to see pt for occupational therapy at this time. Pt with new CT findings of extensive pneumomediastinum. Pt remains on Bipap at this time. Will hold therapy at this time.   RN to clarify activity orders with MD.

## 2020-06-15 NOTE — PROGRESS NOTES
Received pt on bipap 16/8 50%. Pt on vapotherm 40L 50% as tolerated during the day. Nebs given Q4 via metaneb. Alternating mucomyst and nacl 3% with every neb treatment. Vest CPT done Q4. Breath sounds diminished/coarse.  ABG in the AM. Will continue to mon

## 2020-06-15 NOTE — PROGRESS NOTES
[facility]  Progress Note    Jarvis Starr Patient Status:  Inpatient    1935 MRN [id number]   [facility] 6NE-A Attending Augusto Horn MD   [facility] Day # 25 PCP Yuli Diaz MD     Subjective:  Jarvis Starr is a(n) 81year old 3.48* 2.83*   HGB 8.3*   < > 9.7* 10.2* 8.4*   HCT 25.9*   < > 30.0* 32.4* 26.5*   MCV 90.9   < > 92.3 93.1 93.6   MCH 29.1   < > 29.8 29.3 29.7   MCHC 32.0   < > 32.3 31.5 31.7   RDW 16.3*   < > 16.6* 16.8* 17.0*   NEPRELIM 8.96*  --   --  19.41* 13.96* reinstitution of Cardizem enterally  · Anxiety/depression/delirium/encephalopathy-remains in need of intermittent Precedex     Plan:  · Continue close monitoring of respiratory status  · Wean vapotherm/fio2 as able; continue BPAP with sleep and PRN.  May us

## 2020-06-15 NOTE — PROGRESS NOTES
[facility]  Progress Note    Josh Martinez Patient Status:  Inpatient    1935 MRN [id number]   [facility] 6NE-A Attending Don Ha MD   Hosp Day # 25 PCP Seun Hallman MD       SUBJECTIVE:  No CP, SOB, or N/V.   Improving Subcutaneous, Daily  insulin detemir (LEVEMIR) 100 UNIT/ML flextouch 10 Units, 10 Units, Subcutaneous, Nightly  furosemide (LASIX) injection 60 mg, 60 mg, Intravenous, Q12H  Albumin Human (ALBUMINAR) 25 % solution 25 g, 25 g, Intravenous, Q12H  dilTIAZem H PRN  magnesium hydroxide (MILK OF MAGNESIA) 400 MG/5ML suspension 30 mL, 30 mL, Oral, Daily PRN  bisacodyl (DULCOLAX) rectal suppository 10 mg, 10 mg, Rectal, Daily PRN  Fleet Enema (FLEET) 7-19 GM/118ML enema 133 mL, 1 enema, Rectal, Once PRN  EPINEPHrine pain control and femoral block  POD 15  Hematoma evacuation   POD 8     2  Hyponatremia  improved      3  Hypokalemia  Improved      4  Copd and bronchial malacia  Nebs   cpap  pulm consult  cxr     5  Anxiety   Ativan  prozac  trazadone     6  htn  Resume

## 2020-06-15 NOTE — PLAN OF CARE
Patient care assumed 1900 on BiPap and Tfs via DHT at 55 (goal) with FWF. NSR on monitor, rare ectopy. BP on low side when sleeping. Pain managed relatively well, mostly in L hip at surgical site.  Able to answer all questions and VERMA with some weakness in

## 2020-06-15 NOTE — PHYSICAL THERAPY NOTE
Attempted to see pt for physical therapy at this time. Pt with new CT findings of extensive pneumomediastinum. Pt remains on Bipap at this time. Will hold therapy at this time.   RN to clarify activity orders with MD.

## 2020-06-16 NOTE — CM/SW NOTE
Colonel Lind, 06/16/20, 12:50 PM  Care Progression Note:  Active Acute Medical Issue:   Closed fracture of left hip, initial encounter [facility])     Other Contributing Medical Factors/Dx. :     5/29: Closed fracture of left hip, initial encounter ([facility] [address] POD 1

## 2020-06-16 NOTE — PLAN OF CARE
Pt has maintained on bipap throughout out the day. Pt able to tolerate about an hour of vapotherm before increased WOB again. Pt continually feels BACILIO despite bipap. titrating FIO2 to maintain saturations >90%.   RR remains elevated, using accessory musc

## 2020-06-16 NOTE — PROGRESS NOTES
06/16/20 0442   BiPAP   $ RT Standby Charge (per 15 min) 1   $ BiPAP in use daily Yes   Device V60   BiPAP / CPAP CE# 1   Mode Spontaneous/Timed   Interface Full face mask   Mask Size Medium   Control Settings   Set Rate 16 breaths/min   Set IPAP 10   S

## 2020-06-16 NOTE — PROGRESS NOTES
Assumed care of pt. At [address]. Pt. precedex gtt and  BiPap w/ increased WOB, accessory muscle use, labored, RR in the 40's, SpO2 80-90%, pt. Agitated and not tolerating mask. Pt. Switched to vapotherm, pt. Tolerated at 40 L and 100% for about 30 minutes.  Alt

## 2020-06-16 NOTE — RESPIRATORY THERAPY NOTE
Pt increasingly short of breath over the course of the morning.  On BIPAP 10/5 90% ABG results are as follows:  Recent Labs   Lab 06/16/20  1122   ABGPHT 7.47*   [id number] 43   OBDNX7O 170*   ABGHCO3 30.5*   ABGBE 5.9*   TEMP 96.8   REN Positive   SITE Lef

## 2020-06-16 NOTE — PROGRESS NOTES
[facility]  Progress Note    Pal Araya Patient Status:  Inpatient    1935 MRN [id number]   [facility] 6NE-A Attending Joy Armstrong MD   [facility] Day # 23 PCP Maykel Ruiz MD     STATUS UPDATE: Subsequent to rounding yesterd midline, no adenopathy, no thyromegaly. Lungs: Inspiratory crackles noted, minimal wheeze   Chest wall: No tenderness or deformity. Heart: Regular rate and rhythm, sinus tachycardia, normal S1S2, no murmur.    Abdomen: Soft, non-tender, non-distended, n prompting anticoagulation  · Left hip bleed while on anticoagulation with evidence of sciatic compression-now status post evacuation of left thigh hematoma with persistent neural compromise of her left leg  · Recurrent PE now status post filter placement

## 2020-06-16 NOTE — PROGRESS NOTES
[facility]  Progress Note    Durene Linear Patient Status:  Inpatient    1935 MRN [id number]   [facility] 6NE-A Attending Augusto Horn MD   Hosp Day # 25 PCP Yuli Diaz MD       SUBJECTIVE:  No CP, SOB, or N/V.    Stable flextouch 10 Units, 10 Units, Subcutaneous, Nightly  furosemide (LASIX) injection 60 mg, 60 mg, Intravenous, Q12H  [START ON 6/16/2020] melatonin cap/tab 5 mg, 5 mg, Per NG Tube, Nightly  Albumin Human (ALBUMINAR) 25 % solution 25 g, 25 g, Intravenous, Q12 Daily PRN  magnesium hydroxide (MILK OF MAGNESIA) 400 MG/5ML suspension 30 mL, 30 mL, Oral, Daily PRN  bisacodyl (DULCOLAX) rectal suppository 10 mg, 10 mg, Rectal, Daily PRN  Fleet Enema (FLEET) 7-19 GM/118ML enema 133 mL, 1 enema, Rectal, Once PRN  Gentry Shaffer for pain control and femoral block  POD 15  Hematoma evacuation   POD 8     2  Hyponatremia  improved      3  Hypokalemia  Improved      4  Copd and bronchial malacia  Nebs   cpap  pulm consult  cxr     5  Anxiety   Ativan  prozac  trazadone     6  htn  Re

## 2020-06-17 NOTE — PLAN OF CARE
In and out of afib/ST with HR sustaining 140s-160s. Dr. Zohra Jonas notified. Amio bolus & gtt ordered. See MAR. Around 0130: Increased WOB, O2 sats decreased to low 80s, RN at bedside, Dr. Delvin Howard and RT aware. RT received orders for BiPap settings.  No improv

## 2020-06-17 NOTE — PHYSICAL THERAPY NOTE
Physical Therapy    Pt intubated overnight and is critically ill. Pt is not appropriate for PT at this time.

## 2020-06-17 NOTE — ANESTHESIA PROCEDURE NOTES
Airway  Urgency: emergent      General Information and Staff    Patient location during procedure: ICU  Anesthesiologist: Ray Magana MD  Performed: anesthesiologist     Consent for Airway (if performed for an anesthetic, see related documentation fo

## 2020-06-17 NOTE — BRIEF PROCEDURE NOTE
Called for intubation of pt on bipap and respiratiory distress  Pneumothorax  Intubated without difficulty  ETT at 20 cm at lips   +etco2  Xray confirmation

## 2020-06-17 NOTE — PROGRESS NOTES
[facility]  Progress Note    Darlyn Polk Patient Status:  Inpatient    1935 MRN [id number]   [facility] 6NE-A Attending Joaquin Chino MD   Hosp Day # 21 PCP Terese Dietrich MD       SUBJECTIVE:  No CP, SOB, or N/V.   Agonal in rectal suppository 650 mg, 650 mg, Rectal, Q6H PRN  docusate sodium (COLACE) liquid 100 mg, 100 mg, Oral, BID  PEG 3350 (MIRALAX) powder packet 17 g, 17 g, Oral, Daily PRN  magnesium hydroxide (MILK OF MAGNESIA) 400 MG/5ML suspension 30 mL, 30 mL, Oral, Da Units, 2-10 Units, Subcutaneous, TID CC and HS  sodium chloride 3 % nebulizer solution 3 mL, 3 mL, Nebulization, Q8H  Dexmedetomidine HCl in NaCl (PRECEDEX) 400 MCG/100ML premix infusion, 0.2-1.5 mcg/kg/hr (Dosing Weight), Intravenous, Continuous  glucose cervical vertebra (HCC)     Hyperglycemia     Bilateral leg edema     COPD exacerbation (HCC)     Vaginitis and vulvovaginitis     LARISSA on CPAP     Closed fracture of left hip, initial encounter ([facility] [address]     Hyponatremia     Fall at home, initial encounter

## 2020-06-17 NOTE — PROGRESS NOTES
Assumed care for this patient at 0730. Patient sedated and intubated but still breathing over the vent/bucking vent. Fentanyl gtt started and bolus given, fentanyl gtt titrate to keep patient synchronous with vent.  SubQ air noted on patient's neck, Dr. Erlinda Block

## 2020-06-17 NOTE — RESPIRATORY THERAPY NOTE
Patient received on full support vent. All vital signs stable. abgs drawn as ordered and dr Jennifer Chavez notified. Vent changes made accordingly.   Will cont with full vent support and monitor closely

## 2020-06-17 NOTE — RESPIRATORY THERAPY NOTE
Received patient @1900 on Vapotherm 40L 100%, maintaining good saturations, but with a lot of accessory muscle use and RR in 40s.   Patient placed on BiPAP 10/5 100%, again with good saturations near 100%, but without any improvement in accessory muscle use

## 2020-06-17 NOTE — PROGRESS NOTES
[facility]  Progress Note    Thelma Martinez Patient Status:  Inpatient    1935 MRN [id number]   [facility] 6NE-A Attending Hema Syed MD   Hosp Day # 21 PCP Tayler Linares MD     STATUS UPDATE: Subsequent to rounding yesterd and tongue normal.  No thrush noted. Neck: Soft, supple neck; trachea midline, no adenopathy, no thyromegaly. Lungs: Shallow very rapid respirations.    Chest wall: Palpable subcutaneous emphysema in the upper chest and neck region   Heart: Regular rate her known severe COPD and recent pulmonary emboli.     · Paroxysmal atrial fibrillation-currently sinus rhythm with the use of amiodarone   · anxiety/depression/encephalopathy-delirium with a repeated desire to withdraw therapy and die-nonissue as she is in phone her  little later this morning. I would advocate that the patient be a DNR as her chances of meaningful recovery are extremely slim/approaching nonexistent.   I have also discussed withdrawal of care and a \"compassionate wean\" as an option t

## 2020-06-18 PROCEDURE — 99232 SBSQ HOSP IP/OBS MODERATE 35: CPT | Performed by: INTERNAL MEDICINE

## 2020-06-18 NOTE — OCCUPATIONAL THERAPY NOTE
Pt continues to be critically ill, OT will follow peripherally and resume sessions as medically approp.

## 2020-06-18 NOTE — PROGRESS NOTES
[facility]  Progress Note    Rahel Toney Patient Status:  Inpatient    1935 MRN [id number]   [facility] 6NE-A Attending Elda Vail MD   [address] Day # 21 PCP Jane Oden MD     Assessment:  1. Severe COPD  2.  Left hip blee TROP 0.083 () 05/31/2020        Medications:    • meropenem  500 mg Intravenous Q12H   • docusate sodium  100 mg Oral BID   • Chlorhexidine Gluconate  15 mL Mouth/Throat Ole@Outernet   • hydrocortisone Na succinate PF  100 mg Intravenous Q8H [address]   • Ins

## 2020-06-18 NOTE — RESPIRATORY THERAPY NOTE
RECEIVED PATIENT ON NITRIC 40PPM CHANGED TANK AT 0930. VENT SETTINGS , RR 34, FIO2 80% PEEP +8  ATTEMPTED TO WEAN FIO2.   DECREASEDD TO 70%  SAO2 AROUND 93-94%  RN CALLED TO SAY PATIEMT SAT DROPPED INTO THE 80'S  INCEASED FIO2 BACK TO 80%  DECREASED

## 2020-06-18 NOTE — DIETARY NOTE
[facility]    NUTRITION ASSESSMENT    Pt does not meet malnutrition criteria.     NUTRITION DIAGNOSIS/PROBLEM:    Inadequate oral intake related to physiological causes as evidenced by estimated intake less than estimated needs - NPO status - ongoing importance of nutrition and helped pt drink Ensure while in room. Assisted with meal selection & ordering. 84/F admitted with closed hip fracture. PMH includes COPD, CA, HTN. Pt seen for LOS. POD #7 for L Hip hemiarthroplasty.  Pt with significant anxiety discomfort/distention)  6.  Alternative means of nutrition at goal to meet 100% patient nutrition prescription    MEDICATIONS:  Precedex, Fentanyl, Versed, Colace, Pepcid, Lasix, Insulin, Abx, Bicarb     LABS:  Glu 164    Pt is at high nutrition risk    FOL

## 2020-06-18 NOTE — RESPIRATORY THERAPY NOTE
Recent Labs   Lab 06/17/20  1901   ABGPHT 7.35   [id number] 81*   BNVGF2U 150*   ABGHCO3 43.4*   ABGBE 15.7*   TEMP 83.4   REN Not Applicable   SITE Arterial Line   DEV Servo Adult   THGB 7.4*       Results paged to Dr. Jaime Luna, instructed to wean FiO2 o

## 2020-06-18 NOTE — PROGRESS NOTES
[facility]  Progress Note    Chepe Cardoza Patient Status:  Inpatient    1935 MRN [id number]   [facility] 6NE-A Attending Eugenio Garner MD   [facility] Day # 21 PCP Shawnee Cerna MD     Subjective:  Chepe Cardoza is a(n) 81year old S1S2, no murmur. Abdomen: Soft, non-tender, non-distended, no masses, no guarding, no   rebound, positive but hypoactive BS, tube feedings infusing with low residuals.    Extremity: 1+ anasarca/dependent edema, no cyanosis   Neurological: Sedated at prese repeated desire to withdraw therapy and die-nonissue as she is intubated and heavily sedated  · Postoperative, symptomatic pulmonary embolus prompting anticoagulation-off therapeutic anticoagulation secondary to heavy bleeding into her hip  · Left hip blee

## 2020-06-18 NOTE — PROGRESS NOTES
06/18/20 0304   Vent Information   $ RT Standby Charge (per 15 min) 1   Ventilator Initiation 06/17/20   Interface Invasive   Vent Type Si   Vent ID 2   Vent Mode PRVC/AC   Settings   FiO2 (%) 90 %   Resp Rate (Set) 34   Vt (Set, mL) 270 mL   Waveform D

## 2020-06-18 NOTE — PLAN OF CARE
Assumed care of pt 1930. Vented + sedated. Pt not tolerating CPT, eyes opened, breathing over vent, not following commands, PRN ativan given (see MAR). Titrating levo to maintain MAP > 65. Afib. HR sustaining 110s-120s. Cards paged.  Increase amio gtt to 1

## 2020-06-18 NOTE — PROGRESS NOTES
[facility]  Progress Note    Durene Linear Patient Status:  Inpatient    1935 MRN [id number]   [facility] 6NE-A Attending Augusto Horn MD   Hosp Day # 21 PCP Yuli Diza MD       SUBJECTIVE:  Sedated   Intubated   Not doin 50 mcg, 50 mcg, Intravenous, Q30 Min PRN  acetaminophen (TYLENOL) tab 650 mg, 650 mg, Oral, Q6H PRN    Or  acetaminophen (TYLENOL) 160 MG/5ML oral liquid 650 mg, 650 mg, Oral, Q6H PRN    Or  acetaminophen (TYLENOL) 650 MG rectal suppository 650 mg, 650 mg, Subcutaneous, Daily  insulin detemir (LEVEMIR) 100 UNIT/ML flextouch 10 Units, 10 Units, Subcutaneous, Nightly  furosemide (LASIX) injection 60 mg, 60 mg, Intravenous, Q12H  dilTIAZem HCl (CARDIZEM) tab 30 mg, 30 mg, Oral, 4 times per day  LORazepam (ATIVA disorder ([facility] [address]     Degenerative disc disease, lumbar     Chronic right shoulder pain     Primary osteoarthritis of right knee     Acute right-sided low back pain without sciatica     Hypokalemia     Hypoxia     Bronchiectasis (HCC)     Lung abnormality

## 2020-06-19 NOTE — PROGRESS NOTES
06/19/20 1630   Clinical Encounter Type   Visited With Health care provider  ( requested by RN to provide family/patient support at scheduled  terminal wean .)   Routine Visit Introduction   Continue Visiting Yes   Patient Spiritual Encounters

## 2020-06-19 NOTE — PLAN OF CARE
Assumed care of pt 1930. Vented and sedated. Not following any commands. Around 2000 pt converted back into afib. Bilat wrist restraints intact. Plan to make pt comfort care and terminally wean tomorrow. Will continue to monitor closely.

## 2020-06-19 NOTE — PROGRESS NOTES
[facility]  Progress Note    Paras Bernal Patient Status:  Inpatient    1935 MRN [id number]   [facility] 6NE-A Attending Anel Hawk MD   Hosp Day # 25 PCP Hari Nichole MD     STATUS UPDATE: Subsequent to rounding yesterd normal.   Throat: Lips, mucosa, and tongue normal.  No thrush noted. Neck: Soft, supple neck; trachea midline, no adenopathy, no thyromegaly. Lungs: Clear anteriorly with a peak pressure of 20 and a plateau pressure of 20.   The patient is assisting the pulmonary emboli.   Over the last 24 hours in combination with nitric oxide and mechanical ventilation, her FiO2 has been lowered.   · Paroxysmal atrial fibrillation-now in atrial fibrillation despite amiodarone  · anxiety/depression/encephalopathy-delirium

## 2020-06-19 NOTE — PROGRESS NOTES
06/19/20 7131   Clinical Encounter Type   Visited With Patient; Family; Patient and family together;Health care provider   Routine Visit Follow-up   Continue Visiting No  (upon request or as needed)   Crisis Visit Patient actively dying  (Terminal wean)

## 2020-06-19 NOTE — PLAN OF CARE
SCr bumped to 1.61 mg/dL; estimated CrCl 23.8 mL/min. Lovenox adjusted to 30 mg SubQ daily and Pepcid adjusted to 20 mg daily per protocol.     Yvonne Woody, PharmD  06/19/20 7:56 AM

## 2020-06-19 NOTE — PROGRESS NOTES
Patient received on full ventilator support PRVC 34/270/70%+8. Saturations 94% at this time with Nitric 40 PPM. Continuing nebs as ordered. Vest treatments held at this time, patient did not tolerate vest well earlier in the day.  Plans of comfort care for

## 2020-06-19 NOTE — OCCUPATIONAL THERAPY NOTE
Family and medical team have decided to pursue comfort care. Pt is not appropriate for skilled therapy. Will sign off at this time.

## 2020-06-19 NOTE — PROGRESS NOTES
[facility]  Progress Note    Chica  Patient Status:  Inpatient    1935 MRN [id number]   [facility] 6NE-A Attending Lorna Callaway MD   Hosp Day # 25 PCP Kalli Dawn MD       SUBJECTIVE:  No CP, SOB, or N/V.   Doing poo PHOS 3.8 06/19/2020    PGLU 248 06/19/2020         Imaging:      Meds:   morphINE 1 mg/mL infusion, 3 mg/hr, Intravenous, Continuous  morphINE sulfate (PF) 4 MG/ML injection 4 mg, 4 mg, Intravenous, Q1H PRN  Meropenem (MERREM) 500 mg in sodium chloride Intravenous, Q1H PRN  famoTIDine (PEPCID) 8mg/ml suspension 20 mg, 20 mg, Per NG Tube, BID  Enoxaparin Sodium (LOVENOX) 40 MG/0.4ML injection 40 mg, 40 mg, Subcutaneous, Daily  insulin detemir (LEVEMIR) 100 UNIT/ML flextouch 10 Units, 10 Units, Subcutaneou [facility])     Post-herpetic polyneuropathy     Recurrent major depressive disorder (HCC)     Degenerative disc disease, lumbar     Chronic right shoulder pain     Primary osteoarthritis of right knee     Acute right-sided low back pain without sciatica     Hyp daughter   Ready for comfort care because patient would not like to live like this   Need to have other family members come to see patient before decision can be made       Shawnee Cerna  6/19/2020  7:08 AM

## 2020-06-20 NOTE — PLAN OF CARE
[facility]. Pt was made comfort care this evening at 1730, family at the bedside.  at the bedside. Pt terminally weaned from ventilator at 1800. Continuous morphine pca in place for comfort, ativan given for anxiety.  Scopolamine patch applied

## 2020-06-20 NOTE — PROGRESS NOTES
[facility]  Progress Note    Jaylon Haji Patient Status:  Inpatient    1935 MRN [id number]   [facility] 6NE-A Attending No att. providers found   Hosp Day # 25 PCP Darryn Cabral MD       SUBJECTIVE:  Patient continues to worsen COPD (chronic obstructive pulmonary disease) (HCC)     Post-herpetic polyneuropathy     Recurrent major depressive disorder (HCC)     Degenerative disc disease, lumbar     Chronic right shoulder pain     Primary osteoarthritis of right knee     Acute right

## 2020-06-22 NOTE — DISCHARGE SUMMARY
[facility]  Discharge Summary    Kamille Meadows Patient Status:  Inpatient    1935 MRN [id number]   [facility] 6NE-A Attending No att. providers found   [facility] Day # 25 PCP Faviola Fang MD     Date of Admission: 2020 12:45 Ativan  prozac  trazadone     6  htn  Resume meds      7  dyslipid  Continue statin      8  Pain management  Will be difficult with pulm suppresion and desats     9  PE /resp collapse filter place heparin held for recurrent PE and hematoma     10  chf Beads (CARTIA XT) 120 MG Oral Capsule SR 24 Hr  Take 120 mg by mouth daily. , Historical    Montelukast Sodium 5 MG Oral Chew Tab  Chew 2 tablets (10 mg total) by mouth nightly., Normal, Disp-30 tablet, R-5    Cholecalciferol (VITAMIN D OR)  Take 1 capsule

## 2021-05-12 NOTE — TELEPHONE ENCOUNTER
C/o pain to arm where flu shot given, explained it can hurt for 2-3 days then start to improve, suggested patient apply ice.
yes

## 2021-12-30 NOTE — RESPIRATORY THERAPY NOTE
Anesthesia PreOp Note    HPI:     Susan Kingston is a 68year old female who presents for preoperative consultation requested by: Nanette Clark MD    Date of Surgery: 12/30/2021    Procedure(s):  LEFT DISTAL RADIUS FRACTURE, OPEN REDUCTION INTERNAL Received pt on room air. Metaneb Q4 duo, Q8 muco, and TID NaCl. BS are diminished and coarse at times. Continue to monitor. [facility])         Date Noted: 06/30/2011        Past Medical History:   Diagnosis Date   • Abnormal PET scan of head 5/13/2016    Patient had abnormal PET scan of brain May 6, 2016 showing bilateral temporoparietal beta amyloid deposition in the gray matter co as per NG   • [address]  2011    as per NG   • CATARACT Left 7/10/15   • CATARACT Right late July 2015   • CHOLECYSTECTOMY      as per NG   • COLONOSCOPY N/A 3/16/2018    Procedure: COLONOSCOPY;  Surgeon: Ismael Hanley MD;  Location: [address] [place] [address] tablet by mouth every 6 (six) hours as needed for Pain., Disp: 15 tablet, Rfl: 0  Donepezil HCl 10 MG Oral Tab, Take 10 mg by mouth nightly., Disp: , Rfl: , 12/29/2021 at 1000  aspirin 81 MG Oral Tab EC, Take 1 tablet (81 mg total) by mouth daily. , Disp: 1 name: Not on file      Number of children: Not on file      Years of education: Not on file      Highest education level: Not on file    Occupational History      Not on file    Tobacco Use      Smoking status: Former Smoker        Packs/day: 1.00        Y file    Available pre-op labs reviewed.   Lab Results   Component Value Date    WBC 5.6 12/29/2021    RBC 4.15 12/29/2021    HGB 12.6 12/29/2021    HCT 38.0 12/29/2021    MCV 91.6 12/29/2021    MCH 30.4 12/29/2021    MCHC 33.2 12/29/2021    RDW 12.6 12/29/2 the nature of the anesthetic plan, benefits, risks including possible dental damage if relevant, major complications, and any alternative forms of anesthetic management. All of the patient's questions were answered to the best of my ability.  The patient

## 2023-01-28 NOTE — PLAN OF CARE
Problem: Impaired Swallowing  Goal: Minimize aspiration risk  Description  Interventions:  - Patient should be alert and upright for all feedings (90 degrees preferred)  - Offer food and liquids at a slow rate  - Encourage small bites of food and small s Skin normal color for race, warm, dry and intact. No evidence of rash.

## 2023-02-01 NOTE — RESPIRATORY THERAPY NOTE
Goal: To achieve optimal ventilation and oxygenation.     INTERVENTIONS:  • Assess for changes in respiratory status  • Assess for changes in mentation and behavior  • Position to facilitate oxygenation and minimize respiratory effort  • Oxygen supplementat Patient Education     Eating Heart-Healthy Foods  Eating has a big impact on your heart health. In fact, eating healthier can improve several of your heart risks at once. For instance, it helps you manage weight, cholesterol, and blood pressure. Here are ideas to help you make heart-healthy changes without giving up all the foods and flavors you love.  Getting started  Talk with your healthcare provider about eating plans, such as the DASH or Mediterranean diet. You may also be referred to a dietitian.  Change a few things at a time. Give yourself time to get used to a few eating changes before adding more.  Work to create a tasty, healthy eating plan that you can stick to for the rest of your life.    Goals for healthy eating  Below are some tips to improve your eating habits:  Limit saturated fats and trans fats. Saturated fats raise your levels of cholesterol, so keep these fats to a minimum. They are found in foods such as fatty meats, whole milk, cheese, and palm and coconut oils. Avoid trans fats because they lower good cholesterol as well as raise bad cholesterol. Trans fats are most often found in processed foods.  Reduce sodium (salt) intake. Eating too much salt may increase your blood pressure. Limit your sodium intake to 2,300 milligrams (mg) per day (the amount in 1 teaspoon of salt), or less if your healthcare provider recommends it. Dining out less often and eating fewer processed foods are two great ways to decrease the amount of salt you consume.  Managing calories. A calorie is a unit of energy. Your body burns calories for fuel, but if you eat more calories than your body burns, the extras are stored as fat. Your healthcare provider can help you create a diet plan to manage your calories. This will likely include eating healthier foods as well as exercising regularly. To help you track your progress, keep a diary to record what you eat and how often you exercise.  Choose the right foods  Aim to  make these foods staples of your diet. If you have diabetes, you may have different recommendations than what is listed here:  Fruits and vegetables provide plenty of nutrients without a lot of calories. At meals, fill half your plate with these foods. Split the other half of your plate between whole grains and lean protein.  Whole grains are high in fiber and rich in vitamins and nutrients. Good choices include whole-wheat bread, pasta, and brown rice.  Lean proteins give you nutrition with less fat. Good choices include fish, skinless chicken, and beans.  Low-fat or nonfat dairy provides nutrients without a lot of fat. Try low-fat or nonfat milk, cheese, or yogurt.  Healthy fats can be good for you in small amounts. These are unsaturated fats, such as olive oil, nuts, and fish. Try to have at least 2 servings per week of fatty fish, such as salmon, sardines, mackerel, rainbow trout, and albacore tuna. These contain omega-3 fatty acids, which are good for your heart. Flaxseed is another source of a heart-healthy fat.  More on heart-healthy eating  Read food labels  Healthy eating starts at the grocery store. Be sure to pay attention to food labels on packaged foods. Look for products that are high in fiber and protein, and low in saturated fat, cholesterol, and sodium. Avoid products that contain trans fat. And pay close attention to serving size. For instance, if you plan to eat two servings, double all the numbers on the label.  Prepare food right  A key part of healthy cooking is cutting down on added fat and salt. Look on the internet for lower-fat, lower-sodium recipes. Also, try these tips:  Remove fat from meat and skin from poultry before cooking.  Skim fat from the surface of soups and sauces.  Broil, boil, bake, steam, grill, and microwave food without added fats.  Choose ingredients that spice up your food without adding calories, fat, or sodium. Try these items: horseradish, hot sauce, lemon, mustard,  nonfat salad dressings, and vinegar. For salt-free herbs and spices, try basil, cilantro, cinnamon, pepper, and rosemary.  Date Last Reviewed: 10/1/2017  © 8143-4501 [employer]. [address] [place] [address]. All rights reserved. This information is not intended as a substitute for professional medical care. Always follow your healthcare professional's instructions.         Patient Education     Exercise for a Healthier Heart     Exercise with a friend. When activity is fun, you're more likely to stick with it.   You may wonder how you can improve the health of your heart. If you’re thinking about exercise, you’re on the right track. You don’t need to become an athlete. But you do need a certain amount of brisk exercise to help strengthen your heart. If you have been diagnosed with a heart condition, your healthcare provider may advise exercise to help stabilize your condition. To help make exercise a habit, choose safe, fun activities.   Before you start  Check with your healthcare provider before starting an exercise program. This is especially important if you have not been active for a while. It's also important if you have a long-term (chronic) health problem such as heart disease, diabetes, or obesity. Or if you are at high risk for having these problems.   Why exercise?  Exercising regularly offers many healthy rewards. It can help you do all of the following:  Improve your blood cholesterol level to help prevent further heart trouble  Lower your blood pressure to help prevent a stroke or heart attack  Control diabetes, or reduce your risk of getting this disease  Improve your heart and lung function  Reach and stay at a healthy weight  Make your muscles stronger so you can stay active  Prevent falls and fractures by slowing the loss of bone mass (osteoporosis)  Manage stress better  Reduce your blood pressure  Improve your sense of self and your body image  Exercise tips    Ease  into your routine. Set small goals. Then build on them. If you are not sure what your activity level should be, talk with your healthcare provider first before starting an exercise routine.  Exercise on most days. Aim for a total of 150 minutes (2 hours and 30 minutes) or more of moderate-intensity aerobic activity each week. Or 75 minutes (1 hour and 15 minutes) or more of vigorous-intensity aerobic activity each week. Or try for a combination of both. Moderate activity means that you breathe heavier and your heart rate increases but you can still talk. Think about doing 40 minutes of moderate exercise, 3 to 4 times a week. For best results, activity should last for about 40 minutes to lower blood pressure and cholesterol. It's OK to work up to the 40-minute period over time. Examples of moderate-intensity activity are walking 1 mile in 15 minutes. Or doing 30 to 45 minutes of yard work.  Step up your daily activity level.  Along with your exercise program, try being more active the whole day. Walk instead of drive. Or park further away so that you take more steps each day. Do more household tasks or yard work. You may not be able to meet the advised mount of physical activity. But doing some moderate- or vigorous-intensity aerobic activity can help reduce your risk for heart disease. Your healthcare provider can help you figure out what is best for you.  Choose 1 or more activities you enjoy.  Walking is one of the easiest things you can do. You can also try swimming, riding a bike, dancing, or taking an exercise class.    When to call your healthcare provider  Call your healthcare provider if you have any of these:   Chest pain or feel dizzy or lightheaded  Burning, tightness, pressure, or heaviness in your chest, neck, shoulders, back, or arms  Abnormal shortness of breath  More joint or muscle pain  A very fast or irregular heartbeat (palpitations)  Yanely last reviewed this educational content on 7/1/2019  ©  7117-7202 [employer]. [address] [place] [address], [place] [address]. All rights reserved. This information is not intended as a substitute for professional medical care. Always follow your healthcare professional's instructions.
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
## 2023-06-26 NOTE — PROGRESS NOTES
[facility] Pulmonary/Critical Care Progress Note     SUBJECTIVE/24H Events: All events, procedures, notes reviewed. No acute events, feels better, cough improved but still present. Dyspnea improved. No f/c/s.  No pain 03/23/19   0944  03/24/19   0724   GLU  96   --   151*  91   BUN  9   --   16  17   CREATSERUM  0.80   --   0.96  0.87   GFRAA  79   --   64  72   GFRNAA  69   --   55*  62   CA  9.5   --   9.2  8.8   NA  133*   --   132*  137   K  3.3*  4.1  4.0  4.1   CL by: Rebecca Carpio MD            Us Venous Doppler Leg Right - Diag Img (cpt=93971)    Result Date: 3/22/2019  CONCLUSION:  No evidence of deep venous thrombosis in the right lower extremity.    Dictated by: Kirsty Domínguez MD on 3/22/2019 at 8:14     A 88-92%; RA presently  · Escalate GERD/reflux regimen to PPI BID  · Continue airway clearance; flutter valve  · Encouraged to mobilize/out of bed as tolerated  · Agree with d/c home today; f/u as outpatient    Washington Baca MD  [facility] Yes
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
## 2023-10-10 NOTE — PROGRESS NOTES
[facility]  Report of Psychiatric Progress Note    Darlyn Polk Patient Status:  Inpatient    1935 MRN [id number]   [facility] 8NE-A Attending Joaquin Chino MD   [facility] Day # 8 PCP Terese Dietrich MD     Date of Admission:  admits to having passive suicidal ideation for many years on and off when she feels anxious, SOB, and overwhelmed. She has NEVER had any intention or plan to hurt herself.      Currently, she c/o generalized anxiety and anxiety episodes with chest tightness [facility] KYLE LLAMAS MD at [facility] ENDOSCOPY   • BRONCHOSCOPY N/A 4/17/2020    Performed by Dena Brian MD at [facility] ENDOSCOPY   • BRONCHOSCOPY N/A 6/17/2019    Performed by Sharath Smith MD at [facility] ENDOSCOPY   • BRONCHOSCOPY N/A 4/30/2019    Performed by Sharath Smith RASH    Medications:    Current Facility-Administered Medications:   •  docusate sodium (COLACE) cap 100 mg, 100 mg, Oral, BID  •  Senna (SENOKOT) tab 17.2 mg, 17.2 mg, Oral, Nightly  •  acetaminophen (OFIRMEV) infusion 1,000 mg, 1,000 mg, Intravenous, Q6H (OXY-IR) cap/tab 2.5 mg, 2.5 mg, Oral, Q4H PRN **OR** [DISCONTINUED] oxyCODONE HCl (OXY-IR) cap/tab 5 mg, 5 mg, Oral, Q4H PRN **OR** [DISCONTINUED] OxyCODONE HCl IR (ROXICODONE) immediate release tab 10 mg, 10 mg, Oral, Q4H PRN  •  PEG 3350 (MIRALAX) powde WBC 16.3 06/05/2020    HGB 9.3 06/05/2020    HCT 28.3 06/05/2020    .0 06/05/2020    CREATSERUM 0.84 06/05/2020    BUN 30 06/05/2020     06/05/2020    K 3.6 06/05/2020     06/05/2020    CO2 33.0 06/05/2020     06/05/2020    CA 7.0 patient/spouse

## 2024-04-25 NOTE — RESPIRATORY THERAPY NOTE
LARISSA - Equipment Use Daily Summary:                  . Set Mode:AUTO CPAP WITH C-FLEX                . Usage in hours:10:15                . 90% Pressure (EPAP) level:6.0                . 90% Insp. Pressure (IPAP): Rolo Temple  AHI:4.0                . S show

## 2024-12-28 NOTE — PROGRESS NOTES
[facility]  Progress Note    Margaret Link Patient Status:  Inpatient    1935 MRN [id number]   [facility] 6NE-A Attending Sophie Keating MD   Hosp Day # 3 PCP Shade Shaw MD       SUBJECTIVE:  Respiratory collapse   Send to [facility]   part of [facility]     Hospitalist Progress Note     Sharonda Mcdonough Patient Status:  Inpatient    1942 MRN [id number]   Location [facility] 8NE-A Attending Michelle Kidd MD   Hosp Day # 1 PCP No primary care provider on file.     Chief Complaint: Dizziness     Subjective:     Patient  seen this AM receiving wound care to RLE by RN. Not dizzy at rest. Elevated HR/ Hypotension overnight noted.     Objective:    Review of Systems:   A comprehensive review of systems was completed; pertinent positive and negatives stated in subjective.    Vital signs:  Temp:  [97.5 °F (36.4 °C)-98.2 °F (36.8 °C)] 97.5 °F (36.4 °C)  Pulse:  [] 97  Resp:  [13-26] 18  BP: ()/(52-80) 94/80  SpO2:  [94 %-100 %] 100 %    Physical Exam:    General: No acute distress  Respiratory: No wheezes, no rhonchi  Cardiovascular: S1, S2, regular rate and rhythm  Abdomen: Soft, Non-tender, non-distended, positive bowel sounds  Neuro: No new focal deficits.   Extremities: No edema      Diagnostic Data:    Labs:  Recent Labs   Lab 24  1103 24  0700   WBC 6.6 5.9   HGB 13.1 12.8   MCV 86.2 88.8   .0 179.0       Recent Labs   Lab 24  1103 24  0700   * 115*   BUN 39* 39*   CREATSERUM 1.63* 1.49*   CA 9.1 8.9   ALB 3.7  --    * 132*   K 4.0 4.0   CL 92* 96*   CO2 28.0 29.0   ALKPHO 107  --    AST 62*  --    ALT 45  --    BILT 1.8*  --    TP 6.5  --        Estimated Glomerular Filtration Rate: 35.1 mL/min/1.73m2 (A) (by CKD-EPI based on SCr of 1.49 mg/dL (H)).    Recent Labs   Lab 24  1333 24  1624 24  1944   TROPHS 105* 104* 113*       No results for input(s): \"PTP\", \"INR\" in the last 168 hours.               Microbiology    No results found for this visit on 24.      Imaging: Reviewed in Epic.    Medications:    apixaban  5 mg Oral BID    metoprolol succinate ER  50 mg Oral BID    levothyroxine  100 mcg Oral Before breakfast    pantoprazole  40  (PORCINE) drip 66425icxas/250mL infusion CONTINUOUS, 200-3,000 Units/hr, Intravenous, Continuous  hydrocortisone Na succinate PF (SOLU-cortef) injection 25 mg, 25 mg, Intravenous, Q8H AMANUEL  furosemide (LASIX) injection 40 mg, 40 mg, Intravenous, BID (Diuret mg Oral QAM AC    fluticasone-salmeterol  1 puff Inhalation BID    ceFAZolin  2 g Intravenous Q12H    furosemide  40 mg Intravenous BID (Diuretic)       Assessment & Plan:      #Afib - persistent and chronic   Cards on Cs  #LLE cellulitis and wounds to  BLE  Wound care  IV Abx   # DEBI/ CKD III  #HypoNA  #elevated trop, demand ischemia in setting of renal failure   #Asthma, COPD stable   #Hypothyroid   Continue levothyroxine       Michelle Kidd MD    Supplementary Documentation:     Quality:  DVT Mechanical Prophylaxis:   SCDs,    DVT Pharmacologic Prophylaxis   Medication    apixaban (Eliquis) tab 5 mg                Code Status: Not on file  Sheikh: External urinary catheter in place  Sheikh Duration (in days):   Central line:    DUSTY:     Discharge is dependent on: clinical   At this point Ms. Mcdonough is expected to be discharge to: home     The 21st Century Cures Act makes medical notes like these available to patients in the interest of transparency. Please be advised this is a medical document. Medical documents are intended to carry relevant information, facts as evident, and the clinical opinion of the practitioner. The medical note is intended as peer to peer communication and may appear blunt or direct. It is written in medical language and may contain abbreviations or verbiage that are unfamiliar.              **Certification      PHYSICIAN Certification of Need for Inpatient Hospitalization - Initial Certification    Patient will require inpatient services that will reasonably be expected to span two midnight's based on the clinical documentation in H+P.   Based on patients current state of illness, I anticipate that, after discharge, patient will require TBD.     PRN  ipratropium-albuterol (DUONEB) nebulizer solution 3 mL, 3 mL, Nebulization, Q4H PRN  Montelukast Sodium (SINGULAIR) chewable tab 10 mg, 10 mg, Oral, Nightly  atorvastatin (LIPITOR) tab 20 mg, 20 mg, Oral, Nightly  traZODone HCl (DESYREL) tab 50 mg, 50 chf       Positive trop consult cards  Echo right sided strain  for lytes   Abn cta with emboli   chf present   CCU  Iv diuresis   Bi osmar Yeung  5/31/2020  10:25 PM

## (undated) DEVICE — STERILE POLYISOPRENE POWDER-FREE SURGICAL GLOVES WITH EMOLLIENT COATING: Brand: PROTEXIS

## (undated) DEVICE — 1200CC GUARDIAN II: Brand: GUARDIAN

## (undated) DEVICE — SOL  .9 1000ML BTL

## (undated) DEVICE — MEDI-VAC NON-CONDUCTIVE SUCTION TUBING: Brand: CARDINAL HEALTH

## (undated) DEVICE — DRAPE C-ARM UNIVERSAL

## (undated) DEVICE — UNIVERSAL STERIBUMP® STERILE (5/CASE): Brand: UNIVERSAL STERIBUMP®

## (undated) DEVICE — 3M™ RED DOT™ MONITORING ELECTRODE WITH FOAM TAPE AND STICKY GEL, 50/BAG, 20/CASE, 72/PLT 2570: Brand: RED DOT™

## (undated) DEVICE — MASK ETCO2 PANORAMIC

## (undated) DEVICE — GLOVE SURG SENSICARE SZ 7

## (undated) DEVICE — CONVERTORS STOCKINETTE: Brand: CONVERTORS

## (undated) DEVICE — LENS FRAMES DISP EYEWEAR

## (undated) DEVICE — MLPD DISPOSABLE PAD (6' ROLL) 3 ROLLS: Brand: SCHAERER MEDICAL USA

## (undated) DEVICE — PAIN TRAY: Brand: MEDLINE INDUSTRIES, INC.

## (undated) DEVICE — SUTURE ETHIBOND 2 V-37

## (undated) DEVICE — GLOVE SURG SENSICARE SZ 7-1/2

## (undated) DEVICE — MEDI-VAC SUCTION HANDLE REGULAR CAPACITY: Brand: CARDINAL HEALTH

## (undated) DEVICE — SPECIMEN CONTAINER,POSITIVE SEAL INDICATOR, OR PACKAGED: Brand: PRECISION

## (undated) DEVICE — BLADE ELECTRODE: Brand: EDGE

## (undated) DEVICE — INTENDED TO AID IN THE PASSING OF SUTURES THROUGH BONE AND SOFT TISSUE DURING ORTHOPEDIC SURGERY: Brand: HOFFEE SUTURE RETRIEVER

## (undated) DEVICE — 60 ML SYRINGE REGULAR TIP: Brand: MONOJECT

## (undated) DEVICE — PAD GROUND ELECTRODE 1.5M

## (undated) DEVICE — Device

## (undated) DEVICE — BOWLS UTILITY 16OZ

## (undated) DEVICE — GOWN SURG AERO CHROME XXL

## (undated) DEVICE — SHEET, T, LAPAROTOMY, STERILE: Brand: MEDLINE

## (undated) DEVICE — SYRINGE 30ML LL TIP

## (undated) DEVICE — SINGLE USE SUCTION VALVE MAJ-209: Brand: SINGLE USE SUCTION VALVE (STERILE)

## (undated) DEVICE — PREMIUM WET SKIN PREP TRAY: Brand: MEDLINE INDUSTRIES, INC.

## (undated) DEVICE — REMOVER DURAPREP 3M

## (undated) DEVICE — DRESSING AQUACEL AG 3.5 X 10

## (undated) DEVICE — LENS PLASTIC DISP EYEWEAR

## (undated) DEVICE — BANDAID COVERLET 1X3

## (undated) DEVICE — BIPOLAR SEALER 23-112-1 AQM 6.0: Brand: AQUAMANTYS™

## (undated) DEVICE — FILTERLINE NASAL ADULT O2/CO2

## (undated) DEVICE — NEBULIZER MICRO MIST W/TEE

## (undated) DEVICE — MASK ISOLATION

## (undated) DEVICE — STERILE POLYISOPRENE POWDER-FREE SURGICAL GLOVES: Brand: PROTEXIS

## (undated) DEVICE — ENDOSCOPY PACK UPPER: Brand: MEDLINE INDUSTRIES, INC.

## (undated) DEVICE — FLUIDGARD® 160 ANTI-FOG SURGICAL MASK WITH ANTI-GLARE SHIELD: Brand: PRECEPT ®

## (undated) DEVICE — SYRINGE 10ML SLIP TIP

## (undated) DEVICE — STERILE HOOK LOCK LATEX FREE ELASTIC BANDAGE 6INX5YD: Brand: HOOK LOCK™

## (undated) DEVICE — HOOD, PEEL-AWAY: Brand: FLYTE

## (undated) DEVICE — SINGLE USE BIOPSY VALVE MAJ-210: Brand: SINGLE USE BIOPSY VALVE (STERILE)

## (undated) DEVICE — 3M™ STERI-STRIP™ REINFORCED ADHESIVE SKIN CLOSURES, R1548, 1 IN X 5 IN (25 MM X 125 MM), 4 STRIPS/ENVELOPE: Brand: 3M™ STERI-STRIP™

## (undated) DEVICE — DRAIN RELIAVAC W/DRN MED 1/8

## (undated) DEVICE — REM POLYHESIVE ADULT PATIENT RETURN ELECTRODE: Brand: VALLEYLAB

## (undated) DEVICE — TOTAL HIP CDS: Brand: MEDLINE INDUSTRIES, INC.

## (undated) DEVICE — GLOVE SURG TRIUMPH SZ 6-1/2

## (undated) DEVICE — 3M™ TEGADERM™ TRANSPARENT FILM DRESSING, 1626W, 4 IN X 4-3/4 IN (10 CM X 12 CM), 50 EACH/CARTON, 4 CARTON/CASE: Brand: 3M™ TEGADERM™

## (undated) DEVICE — SOL  .9 3000ML

## (undated) DEVICE — KENDALL SCD EXPRESS SLEEVES, KNEE LENGTH, MEDIUM: Brand: KENDALL SCD

## (undated) DEVICE — DRAPE,U/SHT,SPLIT,FILM,60X84,STERILE: Brand: MEDLINE

## (undated) DEVICE — Device: Brand: STABLECUT®

## (undated) DEVICE — SUTURE MONOCRYL 4-0 PS-2

## (undated) DEVICE — ALCOHOL 70% 4 OZ

## (undated) DEVICE — DERMABOND LIQUID ADHESIVE

## (undated) DEVICE — HIGH CAPACITY INTRAMEDULLARY BRUSH TIP: Brand: PULSAVAC®

## (undated) DEVICE — TRAP SPECIMEN MUCOUS 70 CUBIC CENTIMETER POLYURETHANE STERILE NOT MADE WITH NATURAL RUBBER LATEX MEDICHOICE: Brand: MEDICHOICE

## (undated) DEVICE — 3M™ IOBAN™ 2 ANTIMICROBIAL INCISE DRAPE 6651EZ: Brand: IOBAN™ 2

## (undated) DEVICE — SUTURE VICRYL 2-0 CP-1

## (undated) DEVICE — THROAT PACKING X-RAY DETECT

## (undated) DEVICE — PILLOW ABDUCTION HIP SM

## (undated) DEVICE — SUTURE TICRON 5 3027-79

## (undated) DEVICE — NEEDLE SPINAL 18X3-1/2 PINK.

## (undated) DEVICE — SUTURE PDS II 0 CT

## (undated) NOTE — IP AVS SNAPSHOT
[facility] [place] [address] [place], [address] ~ [phone]                Discharge Summary   2/9/2017    Jeevan Logan           Admission Information        Provider Department    2/9/2017 Светлана Shah MD  Holland Thacker / Rosie Wu Take 1 tablet by mouth every 8 (eight) hours as needed.     Aloysius Klinefelter     [    ]    [    ]    [    ]    [    ]       ipratropium-albuterol 0.5-2.5 (3) MG/3ML Soln   Commonly known as:  DUONEB        Take 3 mL by nebulization every 6 (six) hours whil 4. Please do not dance, run or go for long walks tonight. 5. Return to your follow up appointment as needed  6. It is not unusual to have soreness at the site of the injection; this should resolve within a few days.   7. Should you develop a headache after 29.8 (01/09/17)  34.8 -- (01/09/17)  226.0 --    (01/08/17)  9.5 (01/08/17)  4.68 (01/08/17)  14.1 (01/08/17)  41.9 (01/08/17)  89.5 (01/08/17)  30.1 (01/08/17)  33.7  (01/08/17)  251.0     (01/06/17)  7.7 (01/06/17)  4.48 (01/06/17)  13.4 (01/06/17)  39. 9 harming yourself, contact [facility] at [phone]. - If you don’t have insurance, Delmi Crawford has partnered with Patient [address] to help you get signed up for insurance coverage.   Patient [place]

## (undated) NOTE — LETTER
18    Patient: eTto Stokes  : 1935 Visit date: 2018    Dear  Dr. Darylene Christians, MD,    Thank you for referring Teto Stokes to my practice. Please find my assessment and plan below.                 Assessment   Acute right-sided low back pain w

## (undated) NOTE — LETTER
Kisha Garcia [address] [facility]  [place], [address] [facility]    Consent for Operation  Date: ______4/19/20____________                                Time: ________1230_______    1.  I authorize the performance upon Becky Siddiqui the following operation:  P procedure has been videotaped, the surgeon will obtain the original videotape. The hospital will not be responsible for storage or maintenance of this tape.   7. For the purpose of advancing medical education, I consent to the admittance of observers to the STATEMENTS REQUIRING INSERTION OR COMPLETION WERE FILLED IN.     Signature of Patient:   ___________________________    When the patient is a minor or mentally incompetent to give consent:  Signature of person authorized to consent for patient: ____________ drugs/illegal medications). Failure to inform my anesthesiologist about these medicines may increase my risk of anesthetic complications. iv. If I am allergic to anything or have had a reaction to anesthesia before.   3. I understand how the anesthesia med I have discussed the procedure and information above with the patient (or patient’s representative) and answered their questions. The patient or their representative has agreed to have anesthesia services.     _______________________________________________

## (undated) NOTE — LETTER
Kisha Garcia [address] [facility] [address]  [place], [address] [facility]    Consent for Operation  Date: __________________                                Time: _______________    1.  I authorize the performance upon Arvind Cowan the following operation:  Procedure(s) procedure has been videotaped, the surgeon will obtain the original videotape. The hospital will not be responsible for storage or maintenance of this tape.   6. For the purpose of advancing medical education, I consent to the admittance of observers to the STATEMENTS REQUIRING INSERTION OR COMPLETION WERE FILLED IN.     Signature of Patient:   ___________________________    When the patient is a minor or mentally incompetent to give consent:  Signature of person authorized to consent for patient: ____________ drugs/illegal medications). Failure to inform my anesthesiologist about these medicines may increase my risk of anesthetic complications. iv. If I am allergic to anything or have had a reaction to anesthesia before.   3. I understand how the anesthesia med I have discussed the procedure and information above with the patient (or patient’s representative) and answered their questions. The patient or their representative has agreed to have anesthesia services.     _______________________________________________

## (undated) NOTE — LETTER
Kisha Garcia 182 6 13[facility]  [place], [address] [facility]    Consent for Operation  Date: __________________                                Time: _______________    1.  I authorize the performance upon Zackary Merlin the following operation:  Procedure(s) procedure has been videotaped, the surgeon will obtain the original videotape. The hospital will not be responsible for storage or maintenance of this tape.   7. For the purpose of advancing medical education, I consent to the admittance of observers to the STATEMENTS REQUIRING INSERTION OR COMPLETION WERE FILLED IN.     Signature of Patient:   ___________________________    When the patient is a minor or mentally incompetent to give consent:  Signature of person authorized to consent for patient: ____________ drugs/illegal medications). Failure to inform my anesthesiologist about these medicines may increase my risk of anesthetic complications. iv. If I am allergic to anything or have had a reaction to anesthesia before.   3. I understand how the anesthesia med I have discussed the procedure and information above with the patient (or patient’s representative) and answered their questions. The patient or their representative has agreed to have anesthesia services.     _______________________________________________

## (undated) NOTE — IP AVS SNAPSHOT
[facility] [place] [address] [place], [address] ~ [phone]                Discharge Summary   2/16/2017    Kaiden Mccrary           Admission Information        Provider Department    2/16/2017 Radha Harris MD  Monique Spence / Elizabeth Mahoney Take 1 tablet by mouth every 8 (eight) hours as needed. Jodie Dao     [    ]    [    ]    [    ]    [    ]       Losartan Potassium 50 MG Tabs   Commonly known as:  COZAAR        Take 50 mg by mouth daily.       [    ]    [    ]    [    ]    [ 7. Should you develop a headache after treatment, rest, drink fluids (especially caffeine, if possible), and take mild analgesics. If the headache does not improve with the above treatment, contact the physician.   8. You may have temporary numbness of the 13.4 (01/06/17)  39.9 (01/06/17)  89.1 (01/06/17)  29.9 (01/06/17)  33.6  (01/06/17)  238.0       Recent Hematology Lab Results (cont.)  (Last 3 results in the past 90 days)    Neutrophil % Lymphocyte % Monocyte % Eosinophil % Basophil % Prelim Neut Abs Fi coverage. Patient [address] is a Federal Navigator program that can help with your Affordable Care Act coverage, as well as all types of Medicaid plans.   To get signed up and covered, please call [phone] and ask to get set up for an insuran

## (undated) NOTE — LETTER
Patient Name: Jarvis Starr        : 1935       Medical Record #: [id number]    CONSENT FOR PROCEDURES/SEDATION    Date: 2017       Time: 2:14 PM        1.  I authorize the performance upon Jarvis Starr the following:    Trigger point injectio

## (undated) NOTE — ED AVS SNAPSHOT
[facility] Emergency Department    [place]  [address] [place] [address]    Phone:  [phone]    Fax:  [phone]           Jeevan Logan   MRN: [id number]    Department:  [facility] Emergency Department   Date of Visit:  1/2/201 IF THERE IS ANY CHANGE OR WORSENING OF YOUR CONDITION, CALL YOUR PRIMARY CARE PHYSICIAN AT ONCE OR RETURN IMMEDIATELY TO THE EMERGENCY DEPARTMENT.     If you have been prescribed any medication(s), please fill your prescription right away and begin taking t

## (undated) NOTE — LETTER
[facility], [address] [facility]  [address] [place] [address], [address] [address] [phone]            [facility]  SUMAYA ROCHA - Response    She was my patient for the last 2 years.   I have all her additional documentation diagnosis. It was not chronic pain syndrome. Chronic pain syndrome is mostly a psychological diagnosis. If the patient has pain in one part of the body but if that patient feels generalized pain, then such a diagnosis should be chronic pain syndrome.   Alfonzo Tang nerve branch block. Some people will do both or some will only do one. The idea of doing intra-articular injection is to get the anti-inflammatory benefit of the steroid to reduce the inflammation in the joint.   For elderly patients with arthritisyudy result and I do not do that anymore. Another problem with this technique is it is very costly for the patient. The probe used for for the procedure is not reimbursable by the insurance company and patient gets bill somewhere between $1500-$3000.   Only ph The reviewer also does not understand that bipolar radiofrequency ablation is not a standard radiofrequency ablation. Standard radiofrequency ablation is unipolar radiofrequency ablation.   I believe reviewer never probably performed bipolar radiofrequency

## (undated) NOTE — MR AVS SNAPSHOT
[facility]  [address] [facility],Fourth Floor, [address]  [address] [facility] [address] [phone]               Thank you for choosing us for your health care visit with Sylvan Kanner, MD.  We are glad to serve you and happy to provide you with [facility] Return to see Dr. Krissy Celestin in 3 months.        Allergies as of Jun 08, 2017     Sulfa Antibiotics Hives    Latex Rash                Today's Vital Signs     BP Pulse Weight             138/76 mmHg 72 172 lb            Current Medications          This list is Start taking on:  7/8/2017           * HYDROcodone-acetaminophen 5-325 MG Tabs   Take 1 tablet by mouth every 6 (six) hours as needed for Pain. What changed: You were already taking a medication with the same name, and this prescription was added.  Make Support Staff. Remember, [employer] is NOT to be used for urgent needs. For medical emergencies, dial 911. Visit [email or web address] to learn more.         Educational Information     Healthy Diet and Regular Exercise  [employer]

## (undated) NOTE — LETTER
Kisha Garcia 182 6 [address]  [facility], [address] [facility]    Consent for Operation  Date: __________________                                Time: _______________    1.  I authorize the performance upon Jarvis Starr the following operation:  bronchoscopy videotape. The \[facility] [place] [facility]\"" will not be responsible for storage or maintenance of this tape. 7. For the purpose of advancing medical education, I consent to the admittance of observers to the Operating Room.   8. I authorize the use of any specimen, organs, ti When the patient is a minor or mentally incompetent to give consent:  Signature of person authorized to consent for patient: ___________________________   Relationship to patient: ____________________________________________________    Signature of Witness these medicines may increase my risk of anesthetic complications. iv. If I am allergic to anything or have had a reaction to anesthesia before. 3. I understand how the anesthesia medicine will help me (benefits).   4. I understand that with any type of an patient’s representative) and answered their questions. The patient or their representative has agreed to have anesthesia services.     _____________________________________________________________________________  Witness        Date   Time  I have gissell

## (undated) NOTE — MR AVS SNAPSHOT
[facility], [place] [address] [facility], [address] [place] [address] [phone]               Thank you for choosing us for your health care visit with Jelly Ward MD.  We are glad to serve you and happy to provide you with this family member will be picking up prescription, office must be given name of individual in advance and they must present an ID as well. ? The name of the person picking up your prescription must be documented in your chart.   Scheduling Tests    If your phy procedure. ? Please take your morning blood pressure medication with a small sip of water. ? You are required to have a responsible adult drive you home after your procedure. You may not take a cab unless you have a responsible adult with you in the cab. 48 hours of the scheduled date, your procedure will be cancelled and rescheduled to a later date. Please contact your insurance carrier to determine what your financial  responsibility will be for the procedure(s).     Cancellation/Rescheduling Appointmen pain, and other related symptoms. Approximately 70 percent of patients will get a good block of the intended nerve. This should help relieve that part of the pain that the blocked nerve controls.  Sometimes after a nerve is blocked, it becomes clear that th inserting an introducer needle or needles through skin and those layers of muscle and soft tissues, so there is some pain involved. However, the skin and deeper tissues are numbed with a local anesthetic before inserting the introducer needle(s).    Will I to be repeated when the effect wears off. Will the radiofrequency ablation help me? It is sometimes difficult to predict if the radiofrequency ablation will indeed help you or not.  Generally speaking, the patients who have responded well to trial blocks Take 1 capsule (100 mg total) by mouth 3 (three) times daily as needed for cough. Commonly known as:  TESSALON           Capsaicin 0.075 % Crea   Apply 1 Application topically 3 (three) times daily.    Commonly known as:  ZOSTRIX           MAGNOLIA SILVER medical emergencies, dial 911. Visit [email or web address]. [email or web address] to learn more.            Visit [facility] online at  [email or web address].tn

## (undated) NOTE — ED AVS SNAPSHOT
[facility] Emergency Department    [place]  [address] [address]    Phone:  [phone]    Fax:  [phone]           Temitope Zuniga   MRN: [id number]    Department:  [facility] Emergency Department   Date of Visit:  1/2/201 You can get these medications from any pharmacy     Bring a paper prescription for each of these medications    - Capsaicin 0.075 % Crea  - gabapentin 300 MG Caps            Discharge References/Attachments     NEUROPATHY, PERIPHERAL (ENGLISH)      Disclos IF THERE IS ANY CHANGE OR WORSENING OF YOUR CONDITION, CALL YOUR PRIMARY CARE PHYSICIAN AT ONCE OR RETURN IMMEDIATELY TO THE EMERGENCY DEPARTMENT.     If you have been prescribed any medication(s), please fill your prescription right away and begin taking t Patient [address] to help you get signed up for insurance coverage. Patient [address] is a Federal Navigator program that can help with your Affordable Care Act coverage, as well as all types of Medicaid plans.   To get signed up and cove[employer]) NRDR ([address]) which includes the Dose Index Registry. PATIENT STATED HISTORY:  Patient complains of right lateral abdomen pain radiating to right back for 2-3 months.                          FINDINGS: ABDOMINAL WALL:  There has been previous hernia surgery repair in the left lower quadrant.  There is a persistent but much smaller fluid collection within the anterior lateral left lower abdominal wall anterior to the rectus sheath now measuring 4.9 x 2.6

## (undated) NOTE — LETTER
[address] [facility]     I agree to have a Peripherally Inserted Central Catheter (PICC) placed in my arm.    1. The PICC insertion procedure, care, maintenance, risks, benefits, and complications have been explained to me by my physic also discussed reasonable alternatives to the PICC, including risks, benefits, and side effects related to the alternatives and risks related to not receiving this procedure.     8.  I have expressed any questions about this procedure to my physician or the

## (undated) NOTE — IP AVS SNAPSHOT
[facility] [place] [address] [place], [address] ~ [phone]                Discharge Summary   1/6/2017    Terry Pacheco           Admission Information        Provider Department    1/6/2017 Ike Juan MD  3sw-A         Herlinda Sawyer Muscle spasms. Chase Aguiar                           fluticasone-salmeterol 500-50 MCG/DOSE Aepb   Commonly known as:  ADVAIR DISKUS        Inhale 1 puff into the lungs 2 (two) times daily as needed.                             gabapentin 300 MG Caps Take 1 tablet by mouth daily.                               ASK your doctor about these medications        Instructions Authorizing Provider    Morning Afternoon Evening As Needed    Capsaicin 0.075 % Crea   Commonly known as:  ZOSTRIX        Apply 1 Appli 4. You may also apply a cold pack or ice at the injection site for 15-20 minutes two to three times a day over the next 3 days. Do not apply ice directly to the skin.   5. Maintain the pain diary given to you and bring this with you to your next appointment Specialties:  Family Practice, IP Consult to Primary Care    Contact information:    [address] [facility] Appointments     Mar 07, 2017  3:15 PM   Exam - Established Patient with MD Yesika Hazel  82 (01/09/17)  10 (01/09/17)  0.67 (01/09/17)  8.4 (01/08/17)  78 (01/08/17)  28      Metabolic Lab Results  (Last result in the past 90 days)    ALT Bilirubin,Total Total Protein Albumin Sodium Potassium Chloride    (01/08/17)  19 (01/08/17)  0.4 (01/08/1 experience these side effects or respond to medications the same. Please call your provider or healthcare team if you have any questions regarding your medications while at home.          Blood Pressure and Cardiac Medications     Losartan Potassium (COZAAR Most common side effects:  Depends on the specific medication but generally include: diarrhea, constipation, headache, allergic reaction (itching, rash, hives)   What to report to your healthcare team: Pain, nausea/vomiting, no bowel movement in 2+ days, d Cholecalciferol (VITAMIN D) 1000 UNITS Oral Cap    Multiple Vitamins-Minerals (CENTRUM SILVER ULTRA WOMENS) Oral Tab

## (undated) NOTE — LETTER
[address] [facility], [address] [address] [facility], [address] [facility]  [address], [address]

## (undated) NOTE — LETTER
Consent to Procedure/Sedation    Date: 6/3/2020    Time: _______________    1. I authorize the performance upon Andrew Soriano the following:    Inferior Vena Cava Filter Insertion    2.  I authorize Dr. Miriam Simmons (and whomever is designated as the [facility] Witness: ____________________     Date: ______________    Printed: 6/3/2020   12:07 PM    Patient Name: Carrillo Blocker        : 1935       Medical Record #: [id number]

## (undated) NOTE — LETTER
Kisha Garcia 182 6 13[facility]  [place], [address] [facility]    Consent for Operation  Date: __________________                                Time: _______________    1.  I authorize the performance upon Vaibhav Figueroa the following operation:  Procedure(s) procedure has been videotaped, the surgeon will obtain the original videotape. The hospital will not be responsible for storage or maintenance of this tape.   7. For the purpose of advancing medical education, I consent to the admittance of observers to the STATEMENTS REQUIRING INSERTION OR COMPLETION WERE FILLED IN.     Signature of Patient:   ___________________________    When the patient is a minor or mentally incompetent to give consent:  Signature of person authorized to consent for patient: ____________ drugs/illegal medications). Failure to inform my anesthesiologist about these medicines may increase my risk of anesthetic complications. iv. If I am allergic to anything or have had a reaction to anesthesia before.   3. I understand how the anesthesia med I have discussed the procedure and information above with the patient (or patient’s representative) and answered their questions. The patient or their representative has agreed to have anesthesia services.     _______________________________________________

## (undated) NOTE — LETTER
Kisha Garcia 182 6 13[facility]  [place], [address] [facility]    Consent for Operation  Date: __________________                                Time: _______________    1.  I authorize the performance upon Josh Martinez the following operation:  Procedure(s) procedure has been videotaped, the surgeon will obtain the original videotape. The hospital will not be responsible for storage or maintenance of this tape.     6. For the purpose of advancing medical education, I consent to the admittance of observers to t STATEMENTS REQUIRING INSERTION OR COMPLETION WERE FILLED IN.     Signature of Patient:   ___________________________    When the patient is a minor or mentally incompetent to give consent:  Signature of person authorized to consent for patient: ____________

## (undated) NOTE — LETTER
MARISSA Notifier: Adrian/[employer]   ANTONIO. Patient Name: Tatyana Houser. Identification Number: [id number]      Advance Beneficiary Notice of Noncoverage (ABN)  NOTE:  If Medicare doesn’t pay for D.gyn probbelow, you may have to pay.   Medicare does not pay f ask to be paid now as I am responsible for payment. I cannot appeal if Medicare is not billed. ? OPTION 3. I don’t want the D.listed above. I understand with this choice  I am not responsible for payment, and I cannot appeal to see if Medicare would pay.

## (undated) NOTE — LETTER
Kisha Garcia 182 6 13[facility]  [place], [address] [place] [address]    Consent for Operation  Date: __________________                                Time: _______________    1.  I authorize the performance upon Kamille Meadows the following operation:  Procedure(s) procedure has been videotaped, the surgeon will obtain the original videotape. The hospital will not be responsible for storage or maintenance of this tape.   7. For the purpose of advancing medical education, I consent to the admittance of observers to the STATEMENTS REQUIRING INSERTION OR COMPLETION WERE FILLED IN.     Signature of Patient:   ___________________________    When the patient is a minor or mentally incompetent to give consent:  Signature of person authorized to consent for patient: ____________ drugs/illegal medications). Failure to inform my anesthesiologist about these medicines may increase my risk of anesthetic complications. iv. If I am allergic to anything or have had a reaction to anesthesia before.   3. I understand how the anesthesia med I have discussed the procedure and information above with the patient (or patient’s representative) and answered their questions. The patient or their representative has agreed to have anesthesia services.     _______________________________________________

## (undated) NOTE — ED AVS SNAPSHOT
Kamille Meadows   MRN: [id number]    Department:  [facility] Emergency Department   Date of Visit:  1/21/2020           Disclosure     Insurance plans vary and the physician(s) referred by the ER may not be covered by your plan.  Please contact your i tell this physician (or your personal doctor if your instructions are to return to your personal doctor) about any new or lasting problems. The primary care or specialist physician will see patients referred from the [facility] Emergency Department.  Hernesto Doshi

## (undated) NOTE — LETTER
MARISSA Notifier: Adrian/geolad   ANTONIO. Patient Name: Leif [place]. Identification Number: [id number]      Advance Beneficiary Notice of Noncoverage (ABN)  NOTE:  If Medicare doesn’t pay for D. Gyn Problembelow, you may have to pay.   Medicare does not pa ask to be paid now as I am responsible for payment. I cannot appeal if Medicare is not billed. ? OPTION 3. I don’t want the D.listed above. I understand with this choice  I am not responsible for payment, and I cannot appeal to see if Medicare would pay.

## (undated) NOTE — LETTER
Consent to Procedure/Sedation    Date: __________________    Time: _______________    1. I authorize the performance upon Valdo Hill the following:  Epidural Steroid Injection      2.  I authorize Dr. Tali Calle (and whomever is designated as the [employer] ___________________________    ___________________    Witness: ____________________     Date: ______________    Printed: 2017   12:45 PM    Patient Name: Bonifacio Bird        : 1935       Medical Record #: [id number]

## (undated) NOTE — LETTER
17    Patient: Diamond Colón  : 1935 Visit date: 2017    Dear  Dr. Farnaz Castaneda MD,    Thank you for referring Diamond Colón to my practice. Please find my assessment and plan below.              Assessment   Accidental fall, initial encounter right foot. She states that changes in position also aggravate her pain and symptoms. Her pain is 8/10. It stops her in her tracks. She cannot walk when she has this trouble. She states the pain lasts for up to 30 minutes.   It can happen 2-3 times p pleural effusion, pneumonia, or pleurisy as a cause of her symptoms. She already sees Dr. Stephanie Segura for pain injections. I have asked her to consult Dr. Stephanie Segura at this time for a nerve root block to include T10, T11, and T12.     I do believe her symptoms are

## (undated) NOTE — MR AVS SNAPSHOT
[facility]  [address] [facility],Fourth Floor, [address]  [address] [place] [address] [phone]               Thank you for choosing us for your health care visit with Pavan Morley MD.  We are glad to serve you and happy to provide you with this Apply 1 Application topically 3 (three) times daily. Commonly known as:  ZOSTRIX           CENTRUM SILVER ULTRA WOMENS Tabs   Take 1 tablet by mouth daily.            Cyclobenzaprine HCl 5 MG Tabs   Take 1 tablet (5 mg total) by mouth 3 (three) times diamante Bring a paper prescription for each of these medications    - HYDROcodone-acetaminophen 5-325 MG Tabs            MyChart     Call the [employer] for assistance with your inactive [employer] account    If you have questions, you can call [phone] to talk

## (undated) NOTE — LETTER
[address] [facility] FOR BLOOD OR BLOOD COMPONENTS      In the course of your treatment, it may become necessary to administer a transfusion of blood or blood components.  This form provides basic information concerning this proc explain the alternatives to you if it has not already been done. I,Michael Del Angel, have read/had read to me the above. I understand the matters bearing on the decision whether or not to authorize a transfusion of blood or blood components.  I have no questio